# Patient Record
Sex: FEMALE | Race: WHITE | Employment: UNEMPLOYED | URBAN - METROPOLITAN AREA
[De-identification: names, ages, dates, MRNs, and addresses within clinical notes are randomized per-mention and may not be internally consistent; named-entity substitution may affect disease eponyms.]

---

## 2019-07-29 ENCOUNTER — HOSPITAL ENCOUNTER (EMERGENCY)
Age: 84
Discharge: HOME | End: 2019-07-29
Attending: EMERGENCY MEDICINE
Payer: MEDICARE

## 2019-07-29 ENCOUNTER — APPOINTMENT (OUTPATIENT)
Dept: GENERAL RADIOLOGY | Age: 84
End: 2019-07-29
Payer: MEDICARE

## 2019-07-29 ENCOUNTER — APPOINTMENT (OUTPATIENT)
Dept: CT IMAGING | Age: 84
End: 2019-07-29
Payer: MEDICARE

## 2019-07-29 VITALS
HEIGHT: 66 IN | RESPIRATION RATE: 16 BRPM | OXYGEN SATURATION: 98 % | DIASTOLIC BLOOD PRESSURE: 75 MMHG | TEMPERATURE: 98.2 F | BODY MASS INDEX: 28.77 KG/M2 | WEIGHT: 179 LBS | HEART RATE: 78 BPM | SYSTOLIC BLOOD PRESSURE: 134 MMHG

## 2019-07-29 DIAGNOSIS — L30.9 DERMATITIS: ICD-10-CM

## 2019-07-29 DIAGNOSIS — L03.119 CELLULITIS OF UPPER EXTREMITY, UNSPECIFIED LATERALITY: ICD-10-CM
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
## 2019-07-29 DIAGNOSIS — R21 RASH AND OTHER NONSPECIFIC SKIN ERUPTION: Primary | ICD-10-CM
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
## 2019-07-29 LAB
ALBUMIN SERPL-MCNC: 4.1 G/DL (ref 3.5–5.2)
ALP BLD-CCNC: 84 U/L (ref 35–104)
ALT SERPL-CCNC: 30 U/L (ref 0–32)
ANION GAP SERPL CALCULATED.3IONS-SCNC: 15 MMOL/L (ref 7–16)
APTT: 30.5 SEC (ref 24.5–35.1)
AST SERPL-CCNC: 47 U/L (ref 0–31)
BASOPHILS ABSOLUTE: 0.06 E9/L (ref 0–0.2)
BASOPHILS RELATIVE PERCENT: 0.7 % (ref 0–2)
BILIRUB SERPL-MCNC: 0.3 MG/DL (ref 0–1.2)
BUN BLDV-MCNC: 24 MG/DL (ref 8–23)
CALCIUM SERPL-MCNC: 9.7 MG/DL (ref 8.6–10.2)
CHLORIDE BLD-SCNC: 103 MMOL/L (ref 98–107)
CO2: 28 MMOL/L (ref 22–29)
CREAT SERPL-MCNC: 1.5 MG/DL (ref 0.5–1)
EOSINOPHILS ABSOLUTE: 0.98 E9/L (ref 0.05–0.5)
EOSINOPHILS RELATIVE PERCENT: 11.7 % (ref 0–6)
GFR AFRICAN AMERICAN: 40
GFR NON-AFRICAN AMERICAN: 33 ML/MIN/1.73
GLUCOSE BLD-MCNC: 88 MG/DL (ref 74–99)
HCT VFR BLD CALC: 40.6 % (ref 34–48)
HEMOGLOBIN: 13.6 G/DL (ref 11.5–15.5)
IMMATURE GRANULOCYTES #: 0.05 E9/L
IMMATURE GRANULOCYTES %: 0.6 % (ref 0–5)
INR BLD: 1
LACTIC ACID, SEPSIS: 1.3 MMOL/L (ref 0.5–1.9)
LYMPHOCYTES ABSOLUTE: 1.61 E9/L (ref 1.5–4)
LYMPHOCYTES RELATIVE PERCENT: 19.2 % (ref 20–42)
MCH RBC QN AUTO: 30.2 PG (ref 26–35)
MCHC RBC AUTO-ENTMCNC: 33.5 % (ref 32–34.5)
MCV RBC AUTO: 90.2 FL (ref 80–99.9)
MONOCYTES ABSOLUTE: 0.73 E9/L (ref 0.1–0.95)
MONOCYTES RELATIVE PERCENT: 8.7 % (ref 2–12)
NEUTROPHILS ABSOLUTE: 4.94 E9/L (ref 1.8–7.3)
NEUTROPHILS RELATIVE PERCENT: 59.1 % (ref 43–80)
PDW BLD-RTO: 13.1 FL (ref 11.5–15)
PLATELET # BLD: 360 E9/L (ref 130–450)
PMV BLD AUTO: 9 FL (ref 7–12)
POTASSIUM SERPL-SCNC: 3.7 MMOL/L (ref 3.5–5)
PROTHROMBIN TIME: 11.2 SEC (ref 9.3–12.4)
RBC # BLD: 4.5 E12/L (ref 3.5–5.5)
SODIUM BLD-SCNC: 146 MMOL/L (ref 132–146)
TOTAL PROTEIN: 7.2 G/DL (ref 6.4–8.3)
WBC # BLD: 8.4 E9/L (ref 4.5–11.5)

## 2019-07-29 PROCEDURE — 36415 COLL VENOUS BLD VENIPUNCTURE: CPT

## 2019-07-29 PROCEDURE — 85025 COMPLETE CBC W/AUTO DIFF WBC: CPT

## 2019-07-29 PROCEDURE — 72125 CT NECK SPINE W/O DYE: CPT

## 2019-07-29 PROCEDURE — 6370000000 HC RX 637 (ALT 250 FOR IP): Performed by: NURSE PRACTITIONER

## 2019-07-29 PROCEDURE — 73030 X-RAY EXAM OF SHOULDER: CPT

## 2019-07-29 PROCEDURE — 96372 THER/PROPH/DIAG INJ SC/IM: CPT

## 2019-07-29 PROCEDURE — 70450 CT HEAD/BRAIN W/O DYE: CPT

## 2019-07-29 PROCEDURE — 73562 X-RAY EXAM OF KNEE 3: CPT

## 2019-07-29 PROCEDURE — 6360000002 HC RX W HCPCS: Performed by: NURSE PRACTITIONER

## 2019-07-29 PROCEDURE — 99284 EMERGENCY DEPT VISIT MOD MDM: CPT

## 2019-07-29 PROCEDURE — 85730 THROMBOPLASTIN TIME PARTIAL: CPT

## 2019-07-29 PROCEDURE — 83605 ASSAY OF LACTIC ACID: CPT

## 2019-07-29 PROCEDURE — 85610 PROTHROMBIN TIME: CPT

## 2019-07-29 PROCEDURE — 6370000000 HC RX 637 (ALT 250 FOR IP): Performed by: EMERGENCY MEDICINE

## 2019-07-29 PROCEDURE — 80053 COMPREHEN METABOLIC PANEL: CPT

## 2019-07-29 RX ORDER — DIPHENHYDRAMINE HCL 25 MG
50 TABLET ORAL ONCE
Status: COMPLETED | OUTPATIENT
Start: 2019-07-29 | End: 2019-07-29

## 2019-07-29 RX ORDER — HYDROXYZINE PAMOATE 25 MG/1
25 CAPSULE ORAL 3 TIMES DAILY PRN
Qty: 30 CAPSULE | Refills: 0 | Status: SHIPPED | OUTPATIENT
Start: 2019-07-29 | End: 2019-08-08

## 2019-07-29 RX ORDER — PREDNISONE 20 MG/1
TABLET ORAL
Qty: 18 TABLET | Refills: 0 | Status: SHIPPED | OUTPATIENT
Start: 2019-07-29 | End: 2019-08-08

## 2019-07-29 RX ORDER — NAPROXEN 500 MG/1
500 TABLET ORAL ONCE
Status: COMPLETED | OUTPATIENT
Start: 2019-07-29 | End: 2019-07-29

## 2019-07-29 RX ORDER — FAMOTIDINE 20 MG/1
20 TABLET, FILM COATED ORAL ONCE
Status: COMPLETED | OUTPATIENT
Start: 2019-07-29 | End: 2019-07-29

## 2019-07-29 RX ORDER — TRIAMCINOLONE ACETONIDE 40 MG/ML
40 INJECTION, SUSPENSION INTRA-ARTICULAR; INTRAMUSCULAR ONCE
Status: COMPLETED | OUTPATIENT
Start: 2019-07-29 | End: 2019-07-29

## 2019-07-29 RX ORDER — FAMOTIDINE 20 MG/1
20 TABLET, FILM COATED ORAL 2 TIMES DAILY
Qty: 14 TABLET | Refills: 0 | Status: ON HOLD | OUTPATIENT
Start: 2019-07-29 | End: 2022-06-14 | Stop reason: HOSPADM

## 2019-07-29 RX ORDER — DIAPER,BRIEF,INFANT-TODD,DISP
EACH MISCELLANEOUS
Qty: 30 G | Refills: 0 | Status: ON HOLD | OUTPATIENT
Start: 2019-07-29 | End: 2022-06-14 | Stop reason: HOSPADM

## 2019-07-29 RX ORDER — TRIAMCINOLONE ACETONIDE 5 MG/G
CREAM TOPICAL
Qty: 45 G | Refills: 0 | Status: SHIPPED | OUTPATIENT
Start: 2019-07-29 | End: 2019-08-05

## 2019-07-29 RX ORDER — DIPHENHYDRAMINE HCL 25 MG
50 CAPSULE ORAL EVERY 6 HOURS PRN
Qty: 40 CAPSULE | Refills: 0 | Status: SHIPPED | OUTPATIENT
Start: 2019-07-29 | End: 2019-08-08

## 2019-07-29 RX ORDER — HYDROXYZINE PAMOATE 25 MG/1
CAPSULE ORAL
Refills: 3 | COMMUNITY
Start: 2019-07-19 | End: 2019-07-29 | Stop reason: ALTCHOICE

## 2019-07-29 RX ORDER — CEPHALEXIN 500 MG/1
500 CAPSULE ORAL ONCE
Status: COMPLETED | OUTPATIENT
Start: 2019-07-29 | End: 2019-07-29

## 2019-07-29 RX ORDER — KETOROLAC TROMETHAMINE 30 MG/ML
60 INJECTION, SOLUTION INTRAMUSCULAR; INTRAVENOUS ONCE
Status: DISCONTINUED | OUTPATIENT
Start: 2019-07-29 | End: 2019-07-29

## 2019-07-29 RX ORDER — CEPHALEXIN 500 MG/1
500 CAPSULE ORAL 3 TIMES DAILY
Qty: 21 CAPSULE | Refills: 0 | Status: SHIPPED | OUTPATIENT
Start: 2019-07-29 | End: 2019-08-05

## 2019-07-29 RX ADMIN — CEPHALEXIN 500 MG: 500 CAPSULE ORAL at 17:48

## 2019-07-29 RX ADMIN — FAMOTIDINE 20 MG: 20 TABLET ORAL at 14:11

## 2019-07-29 RX ADMIN — NAPROXEN 500 MG: 500 TABLET ORAL at 14:11

## 2019-07-29 RX ADMIN — DIPHENHYDRAMINE HCL 50 MG: 25 TABLET ORAL at 14:11

## 2019-07-29 RX ADMIN — TRIAMCINOLONE ACETONIDE 40 MG: 40 INJECTION, SUSPENSION INTRA-ARTICULAR; INTRAMUSCULAR at 14:11

## 2019-07-29 ASSESSMENT — ENCOUNTER SYMPTOMS
SHORTNESS OF BREATH: 0
VISUAL CHANGE: 0
NAUSEA: 0
VOMITING: 0
ABDOMINAL PAIN: 0

## 2019-07-29 ASSESSMENT — PAIN SCALES - GENERAL: PAINLEVEL_OUTOF10: 9

## 2019-07-29 NOTE — ED PROVIDER NOTES
have spoken with the patient and discussed todays results, in addition to providing specific details for the plan of care and counseling regarding the diagnosis and prognosis. Their questions are answered at this time and they are agreeable with the plan. I discussed at length with them reasons for immediate return here for re evaluation. They will followup with their dermatologist and primary care physician by calling their office on Monday.      --------------------------------- ADDITIONAL PROVIDER NOTES ---------------------------------  At this time the patient is without objective evidence of an acute process requiring hospitalization or inpatient management. They have remained hemodynamically stable throughout their entire ED visit and are stable for discharge with outpatient follow-up. The plan has been discussed in detail and they are aware of the specific conditions for emergent return, as well as the importance of follow-up. New Prescriptions    CEPHALEXIN (KEFLEX) 500 MG CAPSULE    Take 1 capsule by mouth 3 times daily for 7 days    DIPHENHYDRAMINE (BENADRYL ALLERGY) 25 MG CAPSULE    Take 2 capsules by mouth every 6 hours as needed for Itching    FAMOTIDINE (PEPCID) 20 MG TABLET    Take 1 tablet by mouth 2 times daily for 7 days    FAMOTIDINE (PEPCID) 20 MG TABLET    Take 1 tablet by mouth 2 times daily for 7 days    HYDROCORTISONE 1 % OINTMENT    Apply topically 2 times daily. HYDROXYZINE (VISTARIL) 25 MG CAPSULE    Take 1 capsule by mouth 3 times daily as needed for Itching    PREDNISONE (DELTASONE) 20 MG TABLET    Sig.: Take 60mg  Po qd x 3 days, then 40mg po qd x3 days, then 20mg po qd x 3 days. QS x 9 days    TRIAMCINOLONE (ARISTOCORT) 0.5 % CREAM    Apply topically 3 times daily. Diagnosis:  1. Rash and other nonspecific skin eruption    2. Dermatitis    3. Fall at home, initial encounter    4.  Cellulitis of upper extremity, unspecified laterality        Disposition:  Patient's daily as needed for Itching, Disp-30 capsule, R-0Print      hydrocortisone 1 % ointment Apply topically 2 times daily. , Disp-30 g, R-0, Print      cephALEXin (KEFLEX) 500 MG capsule Take 1 capsule by mouth 3 times daily for 7 days, Disp-21 capsule, R-0Print       !! - Potential duplicate medications found. Please discuss with provider.         DO Alexander Tang DO  08/01/19 Manuel Fox

## 2019-10-14 ENCOUNTER — HOSPITAL ENCOUNTER (OUTPATIENT)
Age: 84
Discharge: HOME | End: 2019-10-14
Payer: MEDICARE

## 2019-10-14 LAB
ALBUMIN SERPL-MCNC: 4.1 G/DL (ref 3.5–5.2)
ALP BLD-CCNC: 80 U/L (ref 35–104)
ALT SERPL-CCNC: 21 U/L (ref 0–32)
ANION GAP SERPL CALCULATED.3IONS-SCNC: 13 MMOL/L (ref 7–16)
AST SERPL-CCNC: 21 U/L (ref 0–31)
BACTERIA: ABNORMAL /HPF
BASOPHILS ABSOLUTE: 0.06 E9/L (ref 0–0.2)
BASOPHILS RELATIVE PERCENT: 0.6 % (ref 0–2)
BILIRUB SERPL-MCNC: 0.3 MG/DL (ref 0–1.2)
BILIRUBIN URINE: NEGATIVE
BLOOD, URINE: NEGATIVE
BUN BLDV-MCNC: 22 MG/DL (ref 8–23)
CALCIUM SERPL-MCNC: 9.8 MG/DL (ref 8.6–10.2)
CHLORIDE BLD-SCNC: 96 MMOL/L (ref 98–107)
CLARITY: ABNORMAL
CO2: 29 MMOL/L (ref 22–29)
COLOR: YELLOW
CREAT SERPL-MCNC: 1.5 MG/DL (ref 0.5–1)
CREATININE URINE: 155 MG/DL (ref 29–226)
EOSINOPHILS ABSOLUTE: 0.33 E9/L (ref 0.05–0.5)
EOSINOPHILS RELATIVE PERCENT: 3.6 % (ref 0–6)
EPITHELIAL CELLS, UA: ABNORMAL /HPF
GFR AFRICAN AMERICAN: 40
GFR NON-AFRICAN AMERICAN: 33 ML/MIN/1.73
GLUCOSE BLD-MCNC: 203 MG/DL (ref 74–99)
GLUCOSE URINE: 250 MG/DL
HCT VFR BLD CALC: 45.5 % (ref 34–48)
HEMOGLOBIN: 14.9 G/DL (ref 11.5–15.5)
IMMATURE GRANULOCYTES #: 0.03 E9/L
IMMATURE GRANULOCYTES %: 0.3 % (ref 0–5)
KETONES, URINE: ABNORMAL MG/DL
LEUKOCYTE ESTERASE, URINE: ABNORMAL
LYMPHOCYTES ABSOLUTE: 1.18 E9/L (ref 1.5–4)
LYMPHOCYTES RELATIVE PERCENT: 12.7 % (ref 20–42)
MAGNESIUM: 2.2 MG/DL (ref 1.6–2.6)
MCH RBC QN AUTO: 29.9 PG (ref 26–35)
MCHC RBC AUTO-ENTMCNC: 32.7 % (ref 32–34.5)
MCV RBC AUTO: 91.2 FL (ref 80–99.9)
MONOCYTES ABSOLUTE: 0.69 E9/L (ref 0.1–0.95)
MONOCYTES RELATIVE PERCENT: 7.5 % (ref 2–12)
NEUTROPHILS ABSOLUTE: 6.97 E9/L (ref 1.8–7.3)
NEUTROPHILS RELATIVE PERCENT: 75.3 % (ref 43–80)
NITRITE, URINE: NEGATIVE
PARATHYROID HORMONE INTACT: 170 PG/ML (ref 15–65)
PDW BLD-RTO: 14.1 FL (ref 11.5–15)
PH UA: 6.5 (ref 5–9)
PHOSPHORUS: 1.9 MG/DL (ref 2.5–4.5)
PLATELET # BLD: 310 E9/L (ref 130–450)
PMV BLD AUTO: 9.7 FL (ref 7–12)
POTASSIUM SERPL-SCNC: 4 MMOL/L (ref 3.5–5)
PROTEIN PROTEIN: 43 MG/DL (ref 0–12)
PROTEIN UA: ABNORMAL MG/DL
PROTEIN/CREAT RATIO: 0.3
PROTEIN/CREAT RATIO: 0.3 (ref 0–0.2)
RBC # BLD: 4.99 E12/L (ref 3.5–5.5)
RBC UA: ABNORMAL /HPF (ref 0–2)
SODIUM BLD-SCNC: 138 MMOL/L (ref 132–146)
SPECIFIC GRAVITY UA: 1.01 (ref 1–1.03)
TOTAL PROTEIN: 7.3 G/DL (ref 6.4–8.3)
URIC ACID, SERUM: 7 MG/DL (ref 2.4–5.7)
UROBILINOGEN, URINE: 1 E.U./DL
VITAMIN D 25-HYDROXY: 74 NG/ML (ref 30–100)
WBC # BLD: 9.3 E9/L (ref 4.5–11.5)
WBC UA: ABNORMAL /HPF (ref 0–5)

## 2019-10-14 PROCEDURE — 85025 COMPLETE CBC W/AUTO DIFF WBC: CPT

## 2019-10-14 PROCEDURE — 82306 VITAMIN D 25 HYDROXY: CPT

## 2019-10-14 PROCEDURE — 84550 ASSAY OF BLOOD/URIC ACID: CPT

## 2019-10-14 PROCEDURE — 83735 ASSAY OF MAGNESIUM: CPT

## 2019-10-14 PROCEDURE — 84156 ASSAY OF PROTEIN URINE: CPT

## 2019-10-14 PROCEDURE — 84100 ASSAY OF PHOSPHORUS: CPT

## 2019-10-14 PROCEDURE — 36415 COLL VENOUS BLD VENIPUNCTURE: CPT

## 2019-10-14 PROCEDURE — 81001 URINALYSIS AUTO W/SCOPE: CPT

## 2019-10-14 PROCEDURE — 80053 COMPREHEN METABOLIC PANEL: CPT

## 2019-10-14 PROCEDURE — 83970 ASSAY OF PARATHORMONE: CPT

## 2019-10-14 PROCEDURE — 82570 ASSAY OF URINE CREATININE: CPT

## 2021-01-25 ENCOUNTER — IMMUNIZATION (OUTPATIENT)
Dept: PRIMARY CARE CLINIC | Age: 86
End: 2021-01-25
Payer: MEDICARE

## 2021-01-25 PROCEDURE — 0001A COVID-19, PFIZER VACCINE 30MCG/0.3ML DOSE: CPT | Performed by: NURSE PRACTITIONER

## 2021-01-25 PROCEDURE — 91300 COVID-19, PFIZER VACCINE 30MCG/0.3ML DOSE: CPT | Performed by: NURSE PRACTITIONER

## 2021-02-15 ENCOUNTER — IMMUNIZATION (OUTPATIENT)
Dept: PRIMARY CARE CLINIC | Age: 86
End: 2021-02-15
Payer: MEDICARE

## 2021-02-15 PROCEDURE — 91300 COVID-19, PFIZER VACCINE 30MCG/0.3ML DOSE: CPT | Performed by: CLINICAL NURSE SPECIALIST

## 2021-02-15 PROCEDURE — 0002A COVID-19, PFIZER VACCINE 30MCG/0.3ML DOSE: CPT | Performed by: CLINICAL NURSE SPECIALIST

## 2021-10-19 ENCOUNTER — IMMUNIZATION (OUTPATIENT)
Dept: PRIMARY CARE CLINIC | Age: 86
End: 2021-10-19
Payer: MEDICARE

## 2021-10-19 PROCEDURE — 91300 COVID-19, PFIZER VACCINE 30MCG/0.3ML DOSE: CPT | Performed by: NURSE PRACTITIONER

## 2021-10-19 PROCEDURE — 0003A COVID-19, PFIZER VACCINE 30MCG/0.3ML DOSE: CPT | Performed by: NURSE PRACTITIONER

## 2022-06-09 ENCOUNTER — HOSPITAL ENCOUNTER (INPATIENT)
Age: 87
LOS: 2 days | Discharge: NURSING, REHAB OR LONG-TERM CARE | DRG: 057 | End: 2022-06-14
Attending: STUDENT IN AN ORGANIZED HEALTH CARE EDUCATION/TRAINING PROGRAM | Admitting: INTERNAL MEDICINE
Payer: MEDICARE

## 2022-06-09 ENCOUNTER — APPOINTMENT (OUTPATIENT)
Dept: GENERAL RADIOLOGY | Age: 87
DRG: 057 | End: 2022-06-09
Payer: MEDICARE

## 2022-06-09 ENCOUNTER — APPOINTMENT (OUTPATIENT)
Dept: CT IMAGING | Age: 87
DRG: 057 | End: 2022-06-09
Payer: MEDICARE

## 2022-06-09 DIAGNOSIS — R41.82 ALTERED MENTAL STATUS, UNSPECIFIED ALTERED MENTAL STATUS TYPE: Primary | ICD-10-CM

## 2022-06-09 PROBLEM — R29.6 FALLS FREQUENTLY: Status: ACTIVE | Noted: 2022-06-09

## 2022-06-09 LAB
ALBUMIN SERPL-MCNC: 3.9 G/DL (ref 3.5–5.2)
ALP BLD-CCNC: 79 U/L (ref 35–104)
ALT SERPL-CCNC: 15 U/L (ref 0–32)
ANION GAP SERPL CALCULATED.3IONS-SCNC: 12 MMOL/L (ref 7–16)
AST SERPL-CCNC: 24 U/L (ref 0–31)
BACTERIA: NORMAL /HPF
BASOPHILS ABSOLUTE: 0.08 E9/L (ref 0–0.2)
BASOPHILS RELATIVE PERCENT: 0.6 % (ref 0–2)
BILIRUB SERPL-MCNC: 0.5 MG/DL (ref 0–1.2)
BILIRUBIN URINE: NEGATIVE
BLOOD, URINE: NEGATIVE
BUN BLDV-MCNC: 23 MG/DL (ref 6–23)
CALCIUM SERPL-MCNC: 9.7 MG/DL (ref 8.6–10.2)
CHLORIDE BLD-SCNC: 98 MMOL/L (ref 98–107)
CLARITY: CLEAR
CO2: 30 MMOL/L (ref 22–29)
COLOR: YELLOW
CREAT SERPL-MCNC: 1.4 MG/DL (ref 0.5–1)
EOSINOPHILS ABSOLUTE: 0.35 E9/L (ref 0.05–0.5)
EOSINOPHILS RELATIVE PERCENT: 2.7 % (ref 0–6)
GFR AFRICAN AMERICAN: 43
GFR NON-AFRICAN AMERICAN: 36 ML/MIN/1.73
GLUCOSE BLD-MCNC: 199 MG/DL (ref 74–99)
GLUCOSE URINE: NEGATIVE MG/DL
HCT VFR BLD CALC: 44.6 % (ref 34–48)
HEMOGLOBIN: 14.7 G/DL (ref 11.5–15.5)
IMMATURE GRANULOCYTES #: 0.09 E9/L
IMMATURE GRANULOCYTES %: 0.7 % (ref 0–5)
KETONES, URINE: NEGATIVE MG/DL
LEUKOCYTE ESTERASE, URINE: NEGATIVE
LYMPHOCYTES ABSOLUTE: 1.25 E9/L (ref 1.5–4)
LYMPHOCYTES RELATIVE PERCENT: 9.6 % (ref 20–42)
MAGNESIUM: 2 MG/DL (ref 1.6–2.6)
MCH RBC QN AUTO: 30.5 PG (ref 26–35)
MCHC RBC AUTO-ENTMCNC: 33 % (ref 32–34.5)
MCV RBC AUTO: 92.5 FL (ref 80–99.9)
MONOCYTES ABSOLUTE: 1.26 E9/L (ref 0.1–0.95)
MONOCYTES RELATIVE PERCENT: 9.7 % (ref 2–12)
NEUTROPHILS ABSOLUTE: 9.98 E9/L (ref 1.8–7.3)
NEUTROPHILS RELATIVE PERCENT: 76.7 % (ref 43–80)
NITRITE, URINE: NEGATIVE
PDW BLD-RTO: 12.8 FL (ref 11.5–15)
PH UA: 6.5 (ref 5–9)
PLATELET # BLD: 390 E9/L (ref 130–450)
PMV BLD AUTO: 9.6 FL (ref 7–12)
POTASSIUM REFLEX MAGNESIUM: 3.4 MMOL/L (ref 3.5–5)
PROTEIN UA: NEGATIVE MG/DL
RBC # BLD: 4.82 E12/L (ref 3.5–5.5)
RBC UA: NORMAL /HPF (ref 0–2)
SODIUM BLD-SCNC: 140 MMOL/L (ref 132–146)
SPECIFIC GRAVITY UA: 1.01 (ref 1–1.03)
TOTAL CK: 125 U/L (ref 20–180)
TOTAL PROTEIN: 7.1 G/DL (ref 6.4–8.3)
TROPONIN, HIGH SENSITIVITY: 15 NG/L (ref 0–9)
TROPONIN, HIGH SENSITIVITY: 17 NG/L (ref 0–9)
UROBILINOGEN, URINE: 0.2 E.U./DL
WBC # BLD: 13 E9/L (ref 4.5–11.5)
WBC UA: NORMAL /HPF (ref 0–5)

## 2022-06-09 PROCEDURE — 87088 URINE BACTERIA CULTURE: CPT

## 2022-06-09 PROCEDURE — 36415 COLL VENOUS BLD VENIPUNCTURE: CPT

## 2022-06-09 PROCEDURE — 96374 THER/PROPH/DIAG INJ IV PUSH: CPT

## 2022-06-09 PROCEDURE — 99285 EMERGENCY DEPT VISIT HI MDM: CPT

## 2022-06-09 PROCEDURE — 2500000003 HC RX 250 WO HCPCS: Performed by: STUDENT IN AN ORGANIZED HEALTH CARE EDUCATION/TRAINING PROGRAM

## 2022-06-09 PROCEDURE — 83735 ASSAY OF MAGNESIUM: CPT

## 2022-06-09 PROCEDURE — 84484 ASSAY OF TROPONIN QUANT: CPT

## 2022-06-09 PROCEDURE — 81001 URINALYSIS AUTO W/SCOPE: CPT

## 2022-06-09 PROCEDURE — 93005 ELECTROCARDIOGRAM TRACING: CPT | Performed by: STUDENT IN AN ORGANIZED HEALTH CARE EDUCATION/TRAINING PROGRAM

## 2022-06-09 PROCEDURE — 70450 CT HEAD/BRAIN W/O DYE: CPT

## 2022-06-09 PROCEDURE — 73560 X-RAY EXAM OF KNEE 1 OR 2: CPT

## 2022-06-09 PROCEDURE — 2580000003 HC RX 258: Performed by: STUDENT IN AN ORGANIZED HEALTH CARE EDUCATION/TRAINING PROGRAM

## 2022-06-09 PROCEDURE — 80053 COMPREHEN METABOLIC PANEL: CPT

## 2022-06-09 PROCEDURE — 82550 ASSAY OF CK (CPK): CPT

## 2022-06-09 PROCEDURE — 72125 CT NECK SPINE W/O DYE: CPT

## 2022-06-09 PROCEDURE — 96361 HYDRATE IV INFUSION ADD-ON: CPT

## 2022-06-09 PROCEDURE — G0378 HOSPITAL OBSERVATION PER HR: HCPCS

## 2022-06-09 PROCEDURE — 87077 CULTURE AEROBIC IDENTIFY: CPT

## 2022-06-09 PROCEDURE — 87186 SC STD MICRODIL/AGAR DIL: CPT

## 2022-06-09 PROCEDURE — 72170 X-RAY EXAM OF PELVIS: CPT

## 2022-06-09 PROCEDURE — 85025 COMPLETE CBC W/AUTO DIFF WBC: CPT

## 2022-06-09 PROCEDURE — 71046 X-RAY EXAM CHEST 2 VIEWS: CPT

## 2022-06-09 RX ORDER — LABETALOL HYDROCHLORIDE 5 MG/ML
10 INJECTION, SOLUTION INTRAVENOUS ONCE
Status: COMPLETED | OUTPATIENT
Start: 2022-06-09 | End: 2022-06-09

## 2022-06-09 RX ORDER — 0.9 % SODIUM CHLORIDE 0.9 %
1000 INTRAVENOUS SOLUTION INTRAVENOUS ONCE
Status: COMPLETED | OUTPATIENT
Start: 2022-06-09 | End: 2022-06-09

## 2022-06-09 RX ADMIN — SODIUM CHLORIDE 1000 ML: 9 INJECTION, SOLUTION INTRAVENOUS at 13:55

## 2022-06-09 RX ADMIN — LABETALOL HYDROCHLORIDE 10 MG: 5 INJECTION INTRAVENOUS at 18:14

## 2022-06-09 ASSESSMENT — PAIN - FUNCTIONAL ASSESSMENT: PAIN_FUNCTIONAL_ASSESSMENT: NONE - DENIES PAIN

## 2022-06-09 NOTE — LETTER
[address] Medicaid  CERTIFICATION OF NECESSITY  FOR TRANSPORTATION   BY WHEELCHAIR VAN     Individual Information   1. Name: Stefania Pelletier 2. [place] Medicaid Billing Number: facility   3. Address: [address] [place] [address]      Transportation Provider Information   4. Provider Name: Yohana Mae    5. [place] Medicaid Provider Number:  National Provider Identifier (NPI):      Certification  7. Criteria:  By signing this document, the practitioner certifies that two statements are true:  A. This individual must be accompanied by a mobility-related assistive device from the point of pick-up to the point of drop-off. B. Transport of this individual by standard passenger vehicle or common carrier is precluded or contraindicated. 8. Period Beginning Date:    9. Length  [x] Not more than 1 day(s)  [] One Year     Additional Information Relevant to Certification   10. Comments or Explanations, If Necessary or Appropriate     Weakness, dementia,       Certifying Practitioner Information   11. Name of Practitioner: Richard Gonzalez D.O.    [address] Medicaid Provider Number, If Applicable:  [address] Provider Identifier (NPI):      Signature Information   14. Date of Signature: 2022 15. Name of Person Signing: Soraya Prado    12. Signature and Professional Designation: Electronically signed by JERMAINE Baez on 2022 at 2:59 PM       [facility] [id number]  Rev. 2015          Stefania Pelletier : 1935 (86 yrs)    Address: [address] [place] [address] Sex: Female   [place] [address]         Marital Status:    Employer: NONE         Restorationism: [id number]   Primary Care Provider: Syeda Pérez MD         Primary Phone: [phone]   EMERGENCY CONTACT   Contact Name Legal Guardian? Relationship to Patient Home Phone Work Phone   1.  Talon Henao  2. *No Contact Specified*      Spouse    [phone]                 GUARANTOR            Guarantor: Stefania Pelletier     : 1935   Address: [facility] [address]rera Sex: Female   [place] [id number]     Relation to Patient: Self       Home Phone: [phone]   Guarantor ID: [id number]       Work Phone:     Guarantor Employer: NONE         Status: NOT EMPLO*      COVERAGE        PRIMARY INSURANCE   Payor: [facility] MEDICARE Plan: [facility] MEDICARE COMPLETE   Payor Address: ,          Group Number: [id number] Insurance Type: INDEMNITY   Subscriber Name: Ran Martinez : 1935   Subscriber ID: [id number] Pat.  Rel. to Sub: Self

## 2022-06-09 NOTE — ED NOTES
Patient incontinent of urine. Patient cleaned, new gown applied, and new bed linens put on bed.       Elpidio Tracy RN  06/09/22 3069

## 2022-06-09 NOTE — PROGRESS NOTES
Patient's jewelry removed and placed in an envelope. Envelope given to patient. Patient left CT department with jewelry.   One necklace with cross

## 2022-06-09 NOTE — ED PROVIDER NOTES
Department of Emergency Medicine   ED  Provider Note  Admit Date/RoomTime: 6/9/2022  1:29 PM  ED Room: 8404/8404-B          History of Present Illness:  6/9/22, Time: 1:39 PM EDT  Chief Complaint   Patient presents with    Fall     frequent falls, dizziness    Altered Mental Status     increasingly more confused, spouse having difficulty caring for pt        Cathleen De Anda is a 81 y.o. female presenting to the ED for fall, beginning just prior to arrival.  The complaint has been persistent, moderate in severity, and worsened by nothing. The patient is an 55-year-old female with a history of type 2 diabetes, hypertension, hyperlipidemia who presents to the emergency department complaining of fall and increased confusion. The patient symptoms have been gradual onset over the past several days, persistent, moderate severity, nothing makes it better or worse. Apparently the spouse is having difficulty care for the patient at home. She has been increasingly more confused over the past several days. She also been experiencing frequent falls and dizziness at times. Patient denies any symptoms currently. She states that she was walking in the bathroom and twisted her left knee causing her to fall just prior to arrival.  She denies hitting her head, loss of consciousness, anticoagulation use, chest pain, shortness of breath, numbness, tingling, unilateral weakness, abdominal pain, nausea, vomiting, diarrhea, recent hospitalization, recent was, or other acute symptoms or concerns.     Review of Systems:   A complete review of systems was performed and pertinent positives and negatives are stated within HPI, all other systems reviewed and are negative.        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 Past Surgical History:  has a past surgical history that includes  section; eye surgery; Colonoscopy; Cholecystectomy; and Hysterectomy. Social History:  reports that she quit smoking about 36 years ago. She has a 60.00 pack-year smoking history. She has never used smokeless tobacco. She reports current alcohol use. She reports that she does not use drugs. Family History: family history is not on file. . Unless otherwise noted, family history is non contributory    The patients home medications have been reviewed. Allergies: Latex    I have reviewed the past medical history, past surgical history, social history, and family history    ---------------------------------------------------PHYSICAL EXAM--------------------------------------    Constitutional/General: Alert and oriented x3, patient sitting up in bed resting comfortably and in no acute distress  Head: Normocephalic and atraumatic  Eyes:  EOMI, sclera non icteric  ENT: Oropharynx clear, handling secretions, no trismus, no asymmetry of the posterior oropharynx or uvular edema  Neck: Supple, full ROM, no stridor, no meningeal signs  Respiratory: Lungs clear to auscultation bilaterally, no wheezes, rales, or rhonchi. Not in respiratory distress  Cardiovascular:  Regular rate. Regular rhythm. No murmurs, no gallops, no rubs. 2+ distal pulses. Equal extremity pulses. Gastrointestinal:  Abdomen Soft, Non tender, Non distended. No rebound, guarding, or rigidity. No pulsatile masses. Musculoskeletal: Moves all extremities x 4. Warm and well perfused, no clubbing, no cyanosis, no edema. Capillary refill <3 seconds. Mild tenderness palpation diffusely throughout the left knee. There is no decreased range of motion. There is no edema. No erythema. No evidence of septic joint. Sensation intact and equal to the bilateral lower extremities.   No evidence of trauma. There is no cervical, thoracic, or lumbar spinous process tenderness or step-offs or deformities. Skin: skin warm and dry. No rashes. Neurologic: GCS 15, no focal deficits, symmetric strength 5/5 in the upper and lower extremities bilaterally  Psychiatric: Normal Affect      -------------------------------------------------- RESULTS -------------------------------------------------  I have personally reviewed all laboratory and imaging results for this patient. Results are listed below.      LABS: (Lab results interpreted by me)  Results for orders placed or performed during the hospital encounter of 06/09/22   CBC with Auto Differential   Result Value Ref Range    WBC 13.0 (H) 4.5 - 11.5 E9/L    RBC 4.82 3.50 - 5.50 E12/L    Hemoglobin 14.7 11.5 - 15.5 g/dL    Hematocrit 44.6 34.0 - 48.0 %    MCV 92.5 80.0 - 99.9 fL    MCH 30.5 26.0 - 35.0 pg    MCHC 33.0 32.0 - 34.5 %    RDW 12.8 11.5 - 15.0 fL    Platelets 134 317 - 129 E9/L    MPV 9.6 7.0 - 12.0 fL    Neutrophils % 76.7 43.0 - 80.0 %    Immature Granulocytes % 0.7 0.0 - 5.0 %    Lymphocytes % 9.6 (L) 20.0 - 42.0 %    Monocytes % 9.7 2.0 - 12.0 %    Eosinophils % 2.7 0.0 - 6.0 %    Basophils % 0.6 0.0 - 2.0 %    Neutrophils Absolute 9.98 (H) 1.80 - 7.30 E9/L    Immature Granulocytes # 0.09 E9/L    Lymphocytes Absolute 1.25 (L) 1.50 - 4.00 E9/L    Monocytes Absolute 1.26 (H) 0.10 - 0.95 E9/L    Eosinophils Absolute 0.35 0.05 - 0.50 E9/L    Basophils Absolute 0.08 0.00 - 0.20 E9/L   Comprehensive Metabolic Panel w/ Reflex to MG   Result Value Ref Range    Sodium 140 132 - 146 mmol/L    Potassium reflex Magnesium 3.4 (L) 3.5 - 5.0 mmol/L    Chloride 98 98 - 107 mmol/L    CO2 30 (H) 22 - 29 mmol/L    Anion Gap 12 7 - 16 mmol/L    Glucose 199 (H) 74 - 99 mg/dL    BUN 23 6 - 23 mg/dL    CREATININE 1.4 (H) 0.5 - 1.0 mg/dL    GFR Non-African American 36 >=60 mL/min/1.73    GFR African American 43     Calcium 9.7 8.6 - 10.2 mg/dL    Total Protein 7.1 6.4 - 8.3 g/dL    Albumin 3.9 3.5 - 5.2 g/dL    Total Bilirubin 0.5 0.0 - 1.2 mg/dL    Alkaline Phosphatase 79 35 - 104 U/L    ALT 15 0 - 32 U/L    AST 24 0 - 31 U/L   Urinalysis with Microscopic   Result Value Ref Range    Color, UA Yellow Straw/Yellow    Clarity, UA Clear Clear    Glucose, Ur Negative Negative mg/dL    Bilirubin Urine Negative Negative    Ketones, Urine Negative Negative mg/dL    Specific Gravity, UA 1.010 1.005 - 1.030    Blood, Urine Negative Negative    pH, UA 6.5 5.0 - 9.0    Protein, UA Negative Negative mg/dL    Urobilinogen, Urine 0.2 <2.0 E.U./dL    Nitrite, Urine Negative Negative    Leukocyte Esterase, Urine Negative Negative    WBC, UA NONE 0 - 5 /HPF    RBC, UA NONE 0 - 2 /HPF    Bacteria, UA NONE SEEN None Seen /HPF   Troponin   Result Value Ref Range    Troponin, High Sensitivity 17 (H) 0 - 9 ng/L   CK   Result Value Ref Range    Total  20 - 180 U/L   Magnesium   Result Value Ref Range    Magnesium 2.0 1.6 - 2.6 mg/dL   Troponin   Result Value Ref Range    Troponin, High Sensitivity 15 (H) 0 - 9 ng/L   Basic Metabolic Panel w/ Reflex to MG   Result Value Ref Range    Sodium 141 132 - 146 mmol/L    Potassium reflex Magnesium 3.3 (L) 3.5 - 5.0 mmol/L    Chloride 102 98 - 107 mmol/L    CO2 26 22 - 29 mmol/L    Anion Gap 13 7 - 16 mmol/L    Glucose 144 (H) 74 - 99 mg/dL    BUN 19 6 - 23 mg/dL    CREATININE 1.1 (H) 0.5 - 1.0 mg/dL    GFR Non-African American 47 >=60 mL/min/1.73    GFR African American 57     Calcium 9.5 8.6 - 10.2 mg/dL   Hemoglobin A1c   Result Value Ref Range    Hemoglobin A1C 6.3 (H) 4.0 - 5.6 %   Magnesium   Result Value Ref Range    Magnesium 1.9 1.6 - 2.6 mg/dL   POCT Glucose   Result Value Ref Range    Meter Glucose 127 (H) 74 - 99 mg/dL   POCT Glucose   Result Value Ref Range    Meter Glucose 153 (H) 74 - 99 mg/dL   POCT Glucose   Result Value Ref Range    Meter Glucose 251 (H) 74 - 99 mg/dL   POCT Glucose   Result Value Ref Range    Meter Glucose 223 (H) 74 - 99 mg/dL   EKG 12 Lead   Result Value Ref Range    Ventricular Rate 81 BPM    Atrial Rate 81 BPM    P-R Interval 230 ms    QRS Duration 120 ms    Q-T Interval 464 ms    QTc Calculation (Bazett) 539 ms    P Axis 10 degrees    R Axis -33 degrees    T Axis 89 degrees   ,       RADIOLOGY:  Interpreted by Radiologist unless otherwise specified  CT HEAD WO CONTRAST   Final Result   No acute intracranial abnormality. Cortical atrophy and periventricular leukomalacia. CT CERVICAL SPINE WO CONTRAST   Final Result   No acute abnormality of the cervical spine. Advanced multilevel degenerative changes and facet arthrosis. 1.2 cm calcified right thyroid nodule. Follow-up with non emergent thyroid   sonogram recommended. XR CHEST (2 VW)   Final Result   Minimal opacity at the left lung base, likely atelectasis. Suspect small left pleural effusion. XR KNEE LEFT (1-2 VIEWS)   Final Result   No acute bony abnormalities. 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 Suspect small joint effusion. XR PELVIS (1-2 VIEWS)   Final Result   No acute abnormality of the pelvis. EKG Interpretation  Interpreted by emergency department physician, Dr. Naomie Carlton    EKG: This EKG is signed and interpreted by me. Rate: 81  Rhythm: Sinus  Interpretation: Normal sinus rhythm with first-degree AV block, left axis deviation, left ventricular hypertrophy with widening and repolarization abnormality present.   Nonspecific ST changes in the inferior and high lateral leads, no acute elevations, QTC is prolonged, QTC is 539  Comparison: stable as compared to patient's most recent EKG       ------------------------- NURSING NOTES AND VITALS REVIEWED ---------------------------   The nursing notes within the ED encounter and vital signs as below have been reviewed by myself  BP (!) 167/70   Pulse 73   Temp 98.2 °F (36.8 °C) (Temporal)   Resp 18   Ht 5' 6\" (1.676 m) Wt 184 lb 8 oz (83.7 kg)   SpO2 95%   BMI 29.78 kg/m²     Oxygen Saturation Interpretation: Normal    The patients available past medical records and past encounters were reviewed.         ------------------------------ ED COURSE/MEDICAL DECISION MAKING----------------------  Medications   amLODIPine (NORVASC) tablet 2.5 mg (2.5 mg Oral Given 6/10/22 0847)   aspirin EC tablet 81 mg (81 mg Oral Given 6/10/22 0846)   clopidogrel (PLAVIX) tablet 75 mg (75 mg Oral Given 6/10/22 0846)   hydroCHLOROthiazide (HYDRODIURIL) tablet 12.5 mg ( Oral Automatically Held 6/16/22 0900)   losartan (COZAAR) tablet 50 mg (50 mg Oral Given 6/10/22 0847)   metoprolol succinate (TOPROL XL) extended release tablet 25 mg (25 mg Oral Given 6/10/22 0846)   oxybutynin (DITROPAN) tablet 5 mg (5 mg Oral Given 6/10/22 0845)   pravastatin (PRAVACHOL) tablet 40 mg (40 mg Oral Given 6/10/22 0846)   sodium chloride flush 0.9 % injection 5-40 mL (10 mLs IntraVENous Given 6/10/22 0849)   sodium chloride flush 0.9 % injection 10 mL (10 mLs IntraVENous Given 6/10/22 1008)   0.9 % sodium chloride infusion (has no administration in time range)   enoxaparin (LOVENOX) injection 40 mg (40 mg SubCUTAneous Given 6/10/22 0843)   polyethylene glycol (GLYCOLAX) packet 17 g (has no administration in time range)   acetaminophen (TYLENOL) tablet 650 mg (has no administration in time range)     Or   acetaminophen (TYLENOL) suppository 650 mg (has no administration in time range)   glucose chewable tablet 16 g (has no administration in time range)   dextrose bolus 10% 125 mL (has no administration in time range)     Or   dextrose bolus 10% 250 mL (has no administration in time range)   glucagon (rDNA) injection 1 mg (has no administration in time range)   dextrose 5 % solution (has no administration in time range)   pantoprazole (PROTONIX) tablet 40 mg (40 mg Oral Given 6/10/22 4673)   polyethylene glycol (GLYCOLAX) packet 17 g (17 g Oral Given 6/10/22 9744) insulin lispro (HUMALOG) injection vial 0-12 Units (6 Units SubCUTAneous Given 6/10/22 1229)   insulin lispro (HUMALOG) injection vial 0-6 Units (0 Units SubCUTAneous Not Given 6/10/22 0109)   FLUoxetine (PROZAC) capsule 40 mg (40 mg Oral Given 6/10/22 0847)   potassium chloride (KLOR-CON M) extended release tablet 40 mEq (40 mEq Oral Given 6/10/22 0854)   docusate sodium (COLACE) capsule 100 mg (has no administration in time range)   0.9 % sodium chloride bolus (0 mLs IntraVENous Stopped 6/9/22 1600)   labetalol (NORMODYNE;TRANDATE) injection 10 mg (10 mg IntraVENous Given 6/9/22 1814)   magnesium sulfate 1000 mg in dextrose 5% 100 mL IVPB (0 mg IntraVENous Stopped 6/10/22 1135)           The cardiac monitor revealed NSR with a heart rate in the 70s as interpreted by me. The cardiac monitor was ordered secondary to the patient's fall and to monitor the patient for dysrhythmia. CPT [id number]       I, Dr. Janet Garcia, am the primary provider of record    Medical Decision Making:   The patient is an 72-year-old female presents the emergency department for fall and altered mental status. Patient was not altered and remains alert and oriented in the emergency department. There are no focal neurological deficits. She does have a history of CKD. Her creatinine is 1.4 and her baseline is usually 1.4-1.5. Labs are otherwise reassuring. EKG and delta troponin were negative. Urine did not show evidence of acute infection. CT of her head and cervical spine were negative for acute normalities. Small left-sided pleural effusion present on chest x-ray and some atelectasis, no obvious infiltrate or pneumonia. No evidence of fracture in her knee. Her white count is slightly elevated at 13 K. She does not have any symptoms of pneumonia at this time and is also afebrile. However, the  is having difficulty caring for her at home. She will be admitted to medicine for further evaluation and treatment.   She may benefit from PT/OT evaluation and rehab. Oxygen Saturation Interpretation: 95 % on room air. Re-Evaluations:  ED Course as of 06/10/22 1255   Thu Jun 09, 2022 1712 Spoke with [facility] for Dr. Shonda Koo, who accepted for admission. [KG]      ED Course User Index  [KG] Gavin Wu DO           This patient's ED course included: a personal history and physicial examination, re-evaluation prior to disposition, multiple bedside re-evaluations, cardiac monitoring, continuous pulse oximetry and complex medical decision making and emergency management    This patient has remained hemodynamically stable during their ED course. Consultations:  Spoke with Melisa (Medicine). Discussed case. They will admit this patient. Counseling: The emergency provider has spoken with the patient and discussed todays results, in addition to providing specific details for the plan of care and counseling regarding the diagnosis and prognosis. Questions are answered at this time and they are agreeable with the plan.       --------------------------------- IMPRESSION AND DISPOSITION ---------------------------------    IMPRESSION  1. Altered mental status, unspecified altered mental status type        DISPOSITION  Disposition: Admit to med/surg floor  Patient condition is stable        NOTE: This report was transcribed using voice recognition software.  Every effort was made to ensure accuracy; however, inadvertent computerized transcription errors may be present       Gavin Wu DO  06/10/22 1255

## 2022-06-10 LAB
ANION GAP SERPL CALCULATED.3IONS-SCNC: 13 MMOL/L (ref 7–16)
BUN BLDV-MCNC: 19 MG/DL (ref 6–23)
CALCIUM SERPL-MCNC: 9.5 MG/DL (ref 8.6–10.2)
CHLORIDE BLD-SCNC: 102 MMOL/L (ref 98–107)
CO2: 26 MMOL/L (ref 22–29)
CREAT SERPL-MCNC: 1.1 MG/DL (ref 0.5–1)
EKG ATRIAL RATE: 81 BPM
EKG P AXIS: 10 DEGREES
EKG P-R INTERVAL: 230 MS
EKG Q-T INTERVAL: 464 MS
EKG QRS DURATION: 120 MS
EKG QTC CALCULATION (BAZETT): 539 MS
EKG R AXIS: -33 DEGREES
EKG T AXIS: 89 DEGREES
EKG VENTRICULAR RATE: 81 BPM
GFR AFRICAN AMERICAN: 57
GFR NON-AFRICAN AMERICAN: 47 ML/MIN/1.73
GLUCOSE BLD-MCNC: 144 MG/DL (ref 74–99)
HBA1C MFR BLD: 6.3 % (ref 4–5.6)
MAGNESIUM: 1.9 MG/DL (ref 1.6–2.6)
METER GLUCOSE: 127 MG/DL (ref 74–99)
METER GLUCOSE: 151 MG/DL (ref 74–99)
METER GLUCOSE: 153 MG/DL (ref 74–99)
METER GLUCOSE: 194 MG/DL (ref 74–99)
METER GLUCOSE: 223 MG/DL (ref 74–99)
METER GLUCOSE: 251 MG/DL (ref 74–99)
POTASSIUM REFLEX MAGNESIUM: 3.3 MMOL/L (ref 3.5–5)
SODIUM BLD-SCNC: 141 MMOL/L (ref 132–146)

## 2022-06-10 PROCEDURE — 96365 THER/PROPH/DIAG IV INF INIT: CPT

## 2022-06-10 PROCEDURE — 6370000000 HC RX 637 (ALT 250 FOR IP): Performed by: INTERNAL MEDICINE

## 2022-06-10 PROCEDURE — 2580000003 HC RX 258: Performed by: NURSE PRACTITIONER

## 2022-06-10 PROCEDURE — 6360000002 HC RX W HCPCS: Performed by: NURSE PRACTITIONER

## 2022-06-10 PROCEDURE — 83036 HEMOGLOBIN GLYCOSYLATED A1C: CPT

## 2022-06-10 PROCEDURE — G0378 HOSPITAL OBSERVATION PER HR: HCPCS

## 2022-06-10 PROCEDURE — 6370000000 HC RX 637 (ALT 250 FOR IP): Performed by: NURSE PRACTITIONER

## 2022-06-10 PROCEDURE — 80048 BASIC METABOLIC PNL TOTAL CA: CPT

## 2022-06-10 PROCEDURE — 82962 GLUCOSE BLOOD TEST: CPT

## 2022-06-10 PROCEDURE — 99223 1ST HOSP IP/OBS HIGH 75: CPT | Performed by: PSYCHIATRY & NEUROLOGY

## 2022-06-10 PROCEDURE — 97161 PT EVAL LOW COMPLEX 20 MIN: CPT

## 2022-06-10 PROCEDURE — 6360000002 HC RX W HCPCS: Performed by: INTERNAL MEDICINE

## 2022-06-10 PROCEDURE — 97165 OT EVAL LOW COMPLEX 30 MIN: CPT

## 2022-06-10 PROCEDURE — 97535 SELF CARE MNGMENT TRAINING: CPT

## 2022-06-10 PROCEDURE — 83735 ASSAY OF MAGNESIUM: CPT

## 2022-06-10 PROCEDURE — 96372 THER/PROPH/DIAG INJ SC/IM: CPT

## 2022-06-10 PROCEDURE — 36415 COLL VENOUS BLD VENIPUNCTURE: CPT

## 2022-06-10 RX ORDER — SODIUM CHLORIDE 0.9 % (FLUSH) 0.9 %
10 SYRINGE (ML) INJECTION PRN
Status: DISCONTINUED | OUTPATIENT
Start: 2022-06-10 | End: 2022-06-14 | Stop reason: HOSPADM

## 2022-06-10 RX ORDER — ONDANSETRON 2 MG/ML
4 INJECTION INTRAMUSCULAR; INTRAVENOUS EVERY 6 HOURS PRN
Status: DISCONTINUED | OUTPATIENT
Start: 2022-06-10 | End: 2022-06-10

## 2022-06-10 RX ORDER — ACETAMINOPHEN 650 MG/1
650 SUPPOSITORY RECTAL EVERY 6 HOURS PRN
Status: DISCONTINUED | OUTPATIENT
Start: 2022-06-10 | End: 2022-06-14 | Stop reason: HOSPADM

## 2022-06-10 RX ORDER — DOCUSATE SODIUM 100 MG/1
100 CAPSULE, LIQUID FILLED ORAL NIGHTLY
Status: DISCONTINUED | OUTPATIENT
Start: 2022-06-10 | End: 2022-06-14 | Stop reason: HOSPADM

## 2022-06-10 RX ORDER — POTASSIUM CHLORIDE 20 MEQ/1
40 TABLET, EXTENDED RELEASE ORAL 2 TIMES DAILY WITH MEALS
Status: COMPLETED | OUTPATIENT
Start: 2022-06-10 | End: 2022-06-10

## 2022-06-10 RX ORDER — HYDROCHLOROTHIAZIDE 12.5 MG/1
12.5 TABLET ORAL
Status: DISCONTINUED | OUTPATIENT
Start: 2022-06-10 | End: 2022-06-14 | Stop reason: HOSPADM

## 2022-06-10 RX ORDER — LOSARTAN POTASSIUM 50 MG/1
50 TABLET ORAL DAILY
Status: DISCONTINUED | OUTPATIENT
Start: 2022-06-10 | End: 2022-06-14 | Stop reason: HOSPADM

## 2022-06-10 RX ORDER — CLOPIDOGREL BISULFATE 75 MG/1
75 TABLET ORAL DAILY
Status: DISCONTINUED | OUTPATIENT
Start: 2022-06-10 | End: 2022-06-14 | Stop reason: HOSPADM

## 2022-06-10 RX ORDER — AMLODIPINE BESYLATE 2.5 MG/1
2.5 TABLET ORAL DAILY
Status: DISCONTINUED | OUTPATIENT
Start: 2022-06-10 | End: 2022-06-13

## 2022-06-10 RX ORDER — POLYETHYLENE GLYCOL 3350 17 G/17G
17 POWDER, FOR SOLUTION ORAL DAILY
Status: DISCONTINUED | OUTPATIENT
Start: 2022-06-10 | End: 2022-06-14 | Stop reason: HOSPADM

## 2022-06-10 RX ORDER — FLUOXETINE HYDROCHLORIDE 20 MG/1
20 CAPSULE ORAL DAILY
Status: DISCONTINUED | OUTPATIENT
Start: 2022-06-10 | End: 2022-06-10

## 2022-06-10 RX ORDER — DEXTROSE MONOHYDRATE 50 MG/ML
100 INJECTION, SOLUTION INTRAVENOUS PRN
Status: DISCONTINUED | OUTPATIENT
Start: 2022-06-10 | End: 2022-06-14 | Stop reason: HOSPADM

## 2022-06-10 RX ORDER — POLYETHYLENE GLYCOL 3350 17 G/17G
17 POWDER, FOR SOLUTION ORAL DAILY PRN
Status: DISCONTINUED | OUTPATIENT
Start: 2022-06-10 | End: 2022-06-14 | Stop reason: HOSPADM

## 2022-06-10 RX ORDER — ONDANSETRON 4 MG/1
4 TABLET, ORALLY DISINTEGRATING ORAL EVERY 8 HOURS PRN
Status: DISCONTINUED | OUTPATIENT
Start: 2022-06-10 | End: 2022-06-10

## 2022-06-10 RX ORDER — INSULIN LISPRO 100 [IU]/ML
0-6 INJECTION, SOLUTION INTRAVENOUS; SUBCUTANEOUS NIGHTLY
Status: DISCONTINUED | OUTPATIENT
Start: 2022-06-10 | End: 2022-06-14 | Stop reason: HOSPADM

## 2022-06-10 RX ORDER — PANTOPRAZOLE SODIUM 40 MG/1
40 TABLET, DELAYED RELEASE ORAL
Status: DISCONTINUED | OUTPATIENT
Start: 2022-06-10 | End: 2022-06-14 | Stop reason: HOSPADM

## 2022-06-10 RX ORDER — OXYBUTYNIN CHLORIDE 5 MG/1
5 TABLET ORAL 2 TIMES DAILY
Status: DISCONTINUED | OUTPATIENT
Start: 2022-06-10 | End: 2022-06-14 | Stop reason: HOSPADM

## 2022-06-10 RX ORDER — SODIUM CHLORIDE 0.9 % (FLUSH) 0.9 %
5-40 SYRINGE (ML) INJECTION EVERY 12 HOURS SCHEDULED
Status: DISCONTINUED | OUTPATIENT
Start: 2022-06-10 | End: 2022-06-14 | Stop reason: HOSPADM

## 2022-06-10 RX ORDER — METOPROLOL SUCCINATE 25 MG/1
25 TABLET, EXTENDED RELEASE ORAL DAILY
Status: DISCONTINUED | OUTPATIENT
Start: 2022-06-10 | End: 2022-06-14 | Stop reason: HOSPADM

## 2022-06-10 RX ORDER — INSULIN LISPRO 100 [IU]/ML
0-12 INJECTION, SOLUTION INTRAVENOUS; SUBCUTANEOUS
Status: DISCONTINUED | OUTPATIENT
Start: 2022-06-10 | End: 2022-06-14 | Stop reason: HOSPADM

## 2022-06-10 RX ORDER — ENOXAPARIN SODIUM 100 MG/ML
40 INJECTION SUBCUTANEOUS DAILY
Status: DISCONTINUED | OUTPATIENT
Start: 2022-06-10 | End: 2022-06-14 | Stop reason: HOSPADM

## 2022-06-10 RX ORDER — PRAVASTATIN SODIUM 20 MG
40 TABLET ORAL DAILY
Status: DISCONTINUED | OUTPATIENT
Start: 2022-06-10 | End: 2022-06-14 | Stop reason: HOSPADM

## 2022-06-10 RX ORDER — SODIUM CHLORIDE 9 MG/ML
INJECTION, SOLUTION INTRAVENOUS PRN
Status: DISCONTINUED | OUTPATIENT
Start: 2022-06-10 | End: 2022-06-14 | Stop reason: HOSPADM

## 2022-06-10 RX ORDER — FLUOXETINE HYDROCHLORIDE 20 MG/1
40 CAPSULE ORAL DAILY
Status: DISCONTINUED | OUTPATIENT
Start: 2022-06-10 | End: 2022-06-14 | Stop reason: HOSPADM

## 2022-06-10 RX ORDER — MAGNESIUM SULFATE 1 G/100ML
1000 INJECTION INTRAVENOUS ONCE
Status: COMPLETED | OUTPATIENT
Start: 2022-06-10 | End: 2022-06-10

## 2022-06-10 RX ORDER — ACETAMINOPHEN 325 MG/1
650 TABLET ORAL EVERY 6 HOURS PRN
Status: DISCONTINUED | OUTPATIENT
Start: 2022-06-10 | End: 2022-06-14 | Stop reason: HOSPADM

## 2022-06-10 RX ORDER — ASPIRIN 81 MG/1
81 TABLET ORAL DAILY
Status: DISCONTINUED | OUTPATIENT
Start: 2022-06-10 | End: 2022-06-14 | Stop reason: HOSPADM

## 2022-06-10 RX ADMIN — OXYBUTYNIN CHLORIDE 5 MG: 5 TABLET ORAL at 20:32

## 2022-06-10 RX ADMIN — ENOXAPARIN SODIUM 40 MG: 100 INJECTION SUBCUTANEOUS at 08:43

## 2022-06-10 RX ADMIN — POTASSIUM CHLORIDE 40 MEQ: 20 TABLET, EXTENDED RELEASE ORAL at 17:45

## 2022-06-10 RX ADMIN — OXYBUTYNIN CHLORIDE 5 MG: 5 TABLET ORAL at 02:03

## 2022-06-10 RX ADMIN — AMLODIPINE BESYLATE 2.5 MG: 2.5 TABLET ORAL at 08:47

## 2022-06-10 RX ADMIN — SODIUM CHLORIDE, PRESERVATIVE FREE 10 ML: 5 INJECTION INTRAVENOUS at 08:49

## 2022-06-10 RX ADMIN — OXYBUTYNIN CHLORIDE 5 MG: 5 TABLET ORAL at 08:45

## 2022-06-10 RX ADMIN — PANTOPRAZOLE SODIUM 40 MG: 40 TABLET, DELAYED RELEASE ORAL at 05:36

## 2022-06-10 RX ADMIN — HYDROCHLOROTHIAZIDE 12.5 MG: 25 TABLET ORAL at 02:03

## 2022-06-10 RX ADMIN — INSULIN LISPRO 6 UNITS: 100 INJECTION, SOLUTION INTRAVENOUS; SUBCUTANEOUS at 12:29

## 2022-06-10 RX ADMIN — METOPROLOL SUCCINATE 25 MG: 25 TABLET, EXTENDED RELEASE ORAL at 08:46

## 2022-06-10 RX ADMIN — MAGNESIUM SULFATE HEPTAHYDRATE 1000 MG: 10 INJECTION, SOLUTION INTRAVENOUS at 10:11

## 2022-06-10 RX ADMIN — INSULIN LISPRO 2 UNITS: 100 INJECTION, SOLUTION INTRAVENOUS; SUBCUTANEOUS at 17:45

## 2022-06-10 RX ADMIN — PRAVASTATIN SODIUM 40 MG: 20 TABLET ORAL at 08:46

## 2022-06-10 RX ADMIN — POTASSIUM CHLORIDE 40 MEQ: 20 TABLET, EXTENDED RELEASE ORAL at 08:54

## 2022-06-10 RX ADMIN — DOCUSATE SODIUM 100 MG: 100 CAPSULE, LIQUID FILLED ORAL at 20:32

## 2022-06-10 RX ADMIN — POLYETHYLENE GLYCOL 3350 17 G: 17 POWDER, FOR SOLUTION ORAL at 08:49

## 2022-06-10 RX ADMIN — CLOPIDOGREL BISULFATE 75 MG: 75 TABLET ORAL at 08:46

## 2022-06-10 RX ADMIN — INSULIN LISPRO 1 UNITS: 100 INJECTION, SOLUTION INTRAVENOUS; SUBCUTANEOUS at 20:33

## 2022-06-10 RX ADMIN — FLUOXETINE 40 MG: 20 CAPSULE ORAL at 08:47

## 2022-06-10 RX ADMIN — SODIUM CHLORIDE, PRESERVATIVE FREE 10 ML: 5 INJECTION INTRAVENOUS at 20:33

## 2022-06-10 RX ADMIN — SODIUM CHLORIDE, PRESERVATIVE FREE 10 ML: 5 INJECTION INTRAVENOUS at 10:08

## 2022-06-10 RX ADMIN — LOSARTAN POTASSIUM 50 MG: 50 TABLET, FILM COATED ORAL at 08:47

## 2022-06-10 RX ADMIN — ASPIRIN 81 MG: 81 TABLET, COATED ORAL at 08:46

## 2022-06-10 ASSESSMENT — PAIN SCALES - GENERAL
PAINLEVEL_OUTOF10: 4
PAINLEVEL_OUTOF10: 0
PAINLEVEL_OUTOF10: 0

## 2022-06-10 NOTE — PLAN OF CARE
Problem: Skin/Tissue Integrity  Goal: Absence of new skin breakdown  Description: 1. Monitor for areas of redness and/or skin breakdown  2. Assess vascular access sites hourly  3. Every 4-6 hours minimum:  Change oxygen saturation probe site  4. Every 4-6 hours:  If on nasal continuous positive airway pressure, respiratory therapy assess nares and determine need for appliance change or resting period.   Outcome: Progressing     Problem: Safety - Adult  Goal: Free from fall injury  Outcome: Progressing     Problem: ABCDS Injury Assessment  Goal: Absence of physical injury  Outcome: Progressing     Problem: Pain  Goal: Verbalizes/displays adequate comfort level or baseline comfort level  Outcome: Progressing

## 2022-06-10 NOTE — PROGRESS NOTES
[address]        Date:6/10/2022                                                  Patient Name: Maryellen Luong    MRN: [id number]    : 1935    Room: [address] [place] [address]      Evaluating OT: Loren Mary, [address] OTR/L; [id number]      Referring Provider: REGINA Flores CNP    Specific Provider Orders/Date: OT Eval and Treat 6/10/2022      Diagnosis: Falls Frequently     Surgery:  None this admission     Pertinent Medical History:  has a past medical history of Diabetes mellitus ([facility] [address] Diarrhea, Hyperlipidemia, Hypertension, 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      Reason for Admission: frequent falls with dizziness and issues with L knee and increased confusion,  having difficulty caring for pt at home    Recommended Adaptive Equipment: TBD pending progression      Precautions:  Fall Risk, Confusion, incontinent      Assessment of current deficits:     [x] Functional mobility  [x]ADLs  [x] Strength               [x]Cognition    [x] Functional transfers   [x] IADLs         [x] Safety Awareness   [x]Endurance    [x] Fine Coordination              [x] Balance      [] Vision/perception   []Sensation     []Gross Motor Coordination  [] ROM  [] Delirium                   [] Motor Control     OT PLAN OF CARE   OT POC based on physician orders, patient diagnosis and results of clinical assessment    Frequency/Duration: 1-3 days/wk for 2 weeks PRN   Specific OT Treatment Interventions to include:   * Instruction/training on adapted ADL techniques and AE recommendations to increase functional independence within precautions       * Training on energy conservation strategies, correct breathing pattern and techniques to improve independence/tolerance for self-care routine  * Functional transfer/mobility training/DME recommendations for increased independence, safety, and fall prevention  * Patient/Family education to increase follow through with safety techniques and functional independence  * Recommendation of environmental modifications for increased safety with functional transfers/mobility and ADLs  * Therapeutic exercise to improve motor endurance, ROM, and functional strength for ADLs/functional transfers    Home Living: Pt questionable historian/recall of home set up. Son present and providing correct inforation   Pt lives with  (who is home 24/7 and retired) in 2 story home with 2 steps to [facility]. 1st floor set up.  1/2 bath on main floor  Bathroom setup: pt does not access downstairs or upstairs where showers are located  (tub only upstairs and shower downstairs)  Equipment owned: ww, cane    Prior Level of Function: ADLs - pt requires minimal assist from  with all dressing/bathing tasks   IADLs - dependent on  with groceries, cooking, and laundry (pt son present and reports him and his brother live within 10-15min from pt and pt  able to provide assist as needed)  ambulated with ww from bed to recliner or recliner to bathroom  Driving: no - pt  and children drive    Pain Level: 7/10 L knee pain  Cognition: A&O: 2/4 - pt oriented to self and lcoation, required cues to corrected month from July to Inga and year from [address], to 2022  Follows single step directions with increased verbal cues d/t easily distracted and poor short term memory recall   Memory:  fair   Sequencing:  fair   Problem solving:  fair   Judgement/safety:  fair     Functional Assessment:  AM-PAC Daily Activity Raw Score: 15/24   Initial Eval Status  Date: 6/10/22 Treatment Status  Date: STGs = LTGs  Time frame: 10-14 days   Feeding Minimal Assist   Tray set up and proper positioning  Supervision    Grooming Minimal Assist   Wash face seated EOB  Verbal cues to complete task/attention to task  Supervision    UB Dressing Minimal Assist   Doff/destin gown seated EOB with assist d/t poor seated dynamic balance  Supervision    LB Dressing Minimal Assist   [place]/doff socks seated EOB   Educated on figure 4 technique    Decreased dynamic sitting balance requiring verbal cues for correction of balance   Supervision    Bathing Moderate Assist  poor dyamic standing balance would benefit from shower chair   Per pt son completing sponge baths with assist from  for 1+ years  Supervision    Toileting Dependent   Pt unsafe to ambulate to bathroom at this time d/t weakness and decreased insight   RN aware pt incontinent of bowels during session   Minimal Assist    Bed Mobility  Log Roll: NT  Supine to sit: Minimal Assist assist with trunk and educate don use of bed rail  Sit to supine: Minimal Assist   Assist with BLE   Supine to sit: Independent   Sit to supine: Independent    Functional Transfers Sit to stand: Mod A with ww   Stand to sit: Mod A with ww   3 sit to stand transfers EOB with standing ~30sec - 1 minute (pt requesting to return to sitting)  Stand pivot: NT  Commode: NT  Sit to stand: SBA with ww   Stand to sit:SBA with ww  Stand pivot: SBA with ww  Commode: SBA with ww    Functional Mobility NT   Pt unable to tolerate standing > 1 minute, unable to progress BLE toward HOB  Moderate Assist with ww   Balance Sitting:     Static - SBA     Dynamic - SBA  Standing:  Mod A with ww  Sitting:  Static: Supervision  Dynamic: Supervision  Standing: SBA with ww   Activity Tolerance Fair -   Limited d/t overall weakness/fatigue with all functional mobility   Required seated rest break after each attempt to stand     Visual/  Perceptual Glasses: yes    visual tracking - easily distracted                    BUE  ROM/Strength/  Fine motor Coordination Hand dominance: Right      RUE: ROM WFL     Strength: 4/5      Strength: FAIR     Coordination:  FAIR     LUE: ROM WFL     Strength: 4-/5  Strength: FAIR -     Coordination:  FAIR -  increase BUE  muscle strength for improved indep with functional transfers       Fine Motor Coordination:  finger to nose assessment appears WFL  Hearing: WFL   Sensation:  No c/o numbness or tingling - however pt son present reports pt occasionally experiencing numbness on L side d/t history of stroke  Tone: WFL   Edema: unremarkable    Patient/Family Goal: pt family goal is d/c to [facility]    Comment: Cleared by RN to see pt. Upon arrival patient lying supine and agreeable to OT session. At end of session, patient lying supine with call light and phone within reach, all lines and tubes intact. Overall patient demonstrated decreased independence and safety during completion of ADL/functional transfer/mobility tasks. Pt would benefit from continued skilled OT to increase safety and independence with completion of ADL/IADL tasks for functional independence and quality of life. Treatment:   OT treatment provided this date includes:  Facilitation of bed mobility, unsupported sitting balance at EOB (decreased dynamic sitting balance impacting ADL - addressed posture, weight shifting, dynamic reaching to improve independence, difficulty with repositioning in bed requiring increased verbal cues and time to complete), functional transfer supine to EOB (verbal/tactile cues and retraining for transfers from various surfaces d/t decreased balance/sequencing for safety) standing tolerance tasks (addressed posture, balance and activity tolerance d/t impact on ADLs and functional activity - required use of ww in standing - in preparation for ambulation to/ from bathroom; cuing on posture, w/w management and safety) - skilled cuing on hand placement, posture, body mechanics, energy conservation techniques and safety during functional mobility and ADLs.    Therapist facilitated self-care retraining: UB/LB self-care tasks (downing gown, and socks seated EOB), toileting hygiene task and grooming tasks while educating pt on modified techniques, posture, safety and energy conservation techniques. Skilled monitoring of BP, HR, O2 sats and pts response to treatment (monitored O2 - stats above). Rehab Potential: Good  for established goals       LTG: maximize independence with ADLs to return to PLOF    Patient and/or family were instructed on functional diagnosis, prognosis/goals and OT plan of care. Demonstrated fair understanding. [] Malnutrition indicators have been identified and nursing has been notified to ensure a dietitian consult is ordered. ·  Eval Complexity: Low     Evaluation time includes thorough review of current medical information, gathering information on past medical & social history & PLOF, completion of standardized testing, informal observation of tasks, consultation with other medical professions/disciplines, assessment of data & development of POC/goals. Time In: 1500  Time Out: 1530  Total Treatment Time: 15    Min Units   OT Eval Low 41598  x     OT Eval Medium 80970      OT Eval High 63597      OT Re-Eval [id number]       Therapeutic Ex 81595       Therapeutic Activities 67074       ADL/Self Care 07570  15  1   Orthotic Management 77907       Manual [id number]     Neuro Re-Ed 46216       Non-Billable Time          Evaluation Time additionally includes thorough review of current medical information, gathering information on past medical history/social history and prior level of function, interpretation of standardized testing/informal observation of tasks, assessment of data and development of plan of care and goals.          [facility], MSOT OTR/L; [id number]

## 2022-06-10 NOTE — H&P
Hospital Medicine History & Physical      PCP: Franklyn Canela MD    Date of Admission: 2022    Date of Service: .Inga 10,  2022    Chief Complaint:   CHANGE IN MENTAL STATUS AND FALLS      History Of Present Illness:     81 y.o. female presented with  FREQUENT FALLS, HISTORY GIVEN BY . HE STATES THAT SHE WILL JUST BE WALKING AND HER LEGS WILL GIVE OUT AND SHE WILL FALL,  SHE HAS HIT HER HEAD SEVERAL TIMES, NO LOC, SHE DOES NOT LOSE CONSCIOUSNESS. HE SAID HE HAS ASKED HIS DOCTOR FOR HELP WITH HER AT HOME AND HE HAS NOT BEEN SUCCESSFUL. 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 Authorizing Provider   famotidine (PEPCID) 20 MG tablet Take 1 tablet by mouth 2 times daily for 7 days 19  REGINA Berry - CNP   famotidine (PEPCID) 20 MG tablet Take 1 tablet by mouth 2 times daily for 7 days 19  Sepideh Birmingham,    hydrocortisone 1 % ointment Apply topically 2 times daily. 19   Sepideh Birmingham,    colestipol (COLESTID) 1 G tablet Take 1 g by mouth 2 times daily. Historical Provider, MD   folic acid (FOLVITE) 1 MG tablet Take 1 tablet by mouth daily. 4/3/15   Norma Herman, DO   Cyanocobalamin (VITAMIN B-12) 50 MCG tablet Take 1 tablet by mouth daily.  4/3/15 Macie Read,    clopidogrel (PLAVIX) 75 MG tablet Take 1 tablet by mouth daily. 4/3/15   Norma Doran DO   losartan (COZAAR) 50 MG tablet Take 1 tablet by mouth daily. 3/31/15   Norma Doran DO   hydrochlorothiazide (HYDRODIURIL) 12.5 MG tablet Take 1 tablet by mouth Twice a Week. 4/2/15   Norma Doran DO   metoprolol (TOPROL-XL) 25 MG XL tablet Take 1 tablet by mouth daily. 3/31/15   Norma Doran DO   amLODIPine (NORVASC) 2.5 MG tablet Take 1 tablet by mouth daily. 3/31/15   Norma Doran DO   FLUoxetine (PROZAC) 20 MG capsule Take 20 mg by mouth daily. Historical Provider, MD   aspirin 81 MG EC tablet Take 81 mg by mouth daily. Historical Provider, MD   [facility] Liver Oil 1000 MG CAPS Take 1 capsule by mouth daily. Historical Provider, MD   loperamide (IMODIUM) 2 MG capsule Take 2 mg by mouth 4 times daily as needed for Diarrhea. Historical Provider, MD   oxybutynin (DITROPAN) 5 MG tablet Take 5 mg by mouth 2 times daily. Miguel Gómez MD   metFORMIN (GLUCOPHAGE) 1000 MG tablet Take 1,000 mg by mouth 2 times daily (with meals). Historical Provider, MD   pravastatin (PRAVACHOL) 40 MG tablet Take 40 mg by mouth daily. Historical Provider, MD   Multiple Vitamins-Minerals (THERAPEUTIC MULTIVITAMIN-MINERALS) tablet Take 1 tablet by mouth daily. Last dose 10/3/2014    Historical Provider, MD   glimepiride (AMARYL) 2 MG tablet Take 2 mg by mouth every morning (before breakfast). Historical Provider, MD       Allergies:  Latex    Social History:      The patient currently lives *    TOBACCO:   reports that she quit smoking about 36 years ago. She has a 60.00 pack-year smoking history. She has never used smokeless tobacco.  ETOH:   reports current alcohol use. Family History:    POOR HISTORIAN    REVIEW OF SYSTEMS:   Pertinent positives as noted in the HPI. All other systems reviewed and negative.     PHYSICAL EXAM:    /62   Pulse 78   Temp (!) 96.4 °F (35.8 °C) (Temporal)   Resp 18   Ht 5' 6\" (1.676 m)   Wt 184 lb 8 oz (83.7 kg)   SpO2 94%   BMI 29.78 kg/m²     General appearance:  No apparent distress, appears stated age and cooperative. HEENT:  Normal cephalic, atraumatic without obvious deformity. Pupils equal, round, and reactive to light. Extra ocular muscles intact. Conjunctivae/corneas clear. Neck: Supple, with full range of motion. No jugular venous distention. Trachea midline. Respiratory:  Normal respiratory effort. Clear to auscultation, bilaterally without Rales/Wheezes/Rhonchi. Cardiovascular:  Regular rate and rhythm   Abdomen: Soft, non-tender, non-distended with normal bowel sounds. Musculoskeletal:  No clubbing, cyanosis or edema bilaterally. Skin: Skin color, texture, turgor normal.  No rashes or lesions. Neurologic:  Neurovascularly intact without any focal sensory/motor deficits. Cranial nerves: II-XII intact, grossly non-focal.  Psychiatric:  Alert  TO PERSON AND PLACE NOT TIME         Labs:     Recent Labs     06/09/22  1350   WBC 13.0*   HGB 14.7   HCT 44.6        Recent Labs     06/09/22  1350 06/10/22  0453    141   K 3.4* 3.3*   CL 98 102   CO2 30* 26   BUN 23 19   CREATININE 1.4* 1.1*   CALCIUM 9.7 9.5     Recent Labs     06/09/22  1350   AST 24   ALT 15   BILITOT 0.5   ALKPHOS 79     No results for input(s): INR in the last 72 hours. Recent Labs     06/09/22  1350   CKTOTAL 125       Urinalysis:      Lab Results   Component Value Date    NITRU Negative 06/09/2022    WBCUA NONE 06/09/2022    BACTERIA NONE SEEN 06/09/2022    RBCUA NONE 06/09/2022    BLOODU Negative 06/09/2022    SPECGRAV 1.010 06/09/2022    GLUCOSEU Negative 06/09/2022       Radiology:     CXR: I have reviewed the CXR with the following interpretation:     CT HEAD WO CONTRAST   Final Result   No acute intracranial abnormality. Cortical atrophy and periventricular leukomalacia.          CT CERVICAL SPINE WO CONTRAST   Final Result   No acute abnormality of the cervical spine. Advanced multilevel degenerative changes and facet arthrosis. 1.2 cm calcified right thyroid nodule. Follow-up with non emergent thyroid   sonogram recommended. XR CHEST (2 VW)   Final Result   Minimal opacity at the left lung base, likely atelectasis. Suspect small left pleural effusion. XR KNEE LEFT (1-2 VIEWS)   Final Result   No acute bony abnormalities. 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 Suspect small joint effusion. XR PELVIS (1-2 VIEWS)   Final Result   No acute abnormality of the pelvis. ASSESSMENT:    Active Hospital Problems    Diagnosis Date Noted    Falls frequently [R29.6] 06/09/2022     Priority: Medium   HYPOKALEMIA   II DM   THYROID NODULE   NICK   CKD 3   CHANGE IN MENTAL STATUS   HLD  DIZZINESS  PLAN:  ORTHOSTATICS  SSI  PROZAC   NORVASC   NEURO      DVT Prophylaxis: *LOVENOX  Diet: ADULT DIET; Regular; 4 carb choices (60 gm/meal)  Code Status: Full Code    PT/OT Eval Status:  ORDERED    Dispo - *EVIN    Electronically signed by Basilia Squires DO on 6/10/2022 at 6:24 PM Ezequiel Sandhu       Thank you Sandi Foreman MD for the opportunity to be involved in this patient's care.  If you have any questions or concerns please feel free to contact me at [phone]

## 2022-06-10 NOTE — PROGRESS NOTES
Physical Therapy    Initial Assessment     Name: Elijah Hernandez  : 1935  MRN: [id number]      Date of Service: 6/10/2022    Evaluating PT: Clarke Stovall, PT, DPT [id number]      Room #:  2966/9457-S  Diagnosis:  Falls frequently [R29.6]  Altered mental status, unspecified altered mental status type [R41.82]  PMHx/PSHx:  HTN, Diarrhea, DM, HLD, Stenosis of Intracranial Portions or Left Internal Carotid Artery, Stenosis of Left Middle Cerebral Artery, Stroke  Precautions:  Fall risk, [place], Impulsive, Cognition, Alarm     SUBJECTIVE:    Pt is a questionable historian. Pt reports living with  in a 2 story house with 2 stair(s) and 0 rail(s) to enter. Bed and bath on 1st floor. Pt ambulated with [employer] prior to admission. OBJECTIVE:   Initial Evaluation  Date: 6/10/22 Treatment Date: Short Term/ Long Term   Goals   AM-PAC 6 Clicks 16/10     Was pt agreeable to Eval/treatment? Yes     Does pt have pain? Pt reported no pain at baseline; 10/10 L knee pain during activity     Bed Mobility  Rolling: NT  Supine to sit: SBA  Sit to supine: SBA  Scooting: SBA  Rolling: Independent  Supine to sit: Independent  Sit to supine: Independent  Scooting: Independent   Transfers Sit to stand: Mod A  Stand to sit: Min A  Stand pivot: NT  Sit to stand: Supervision  Stand to sit: Supervision  Stand pivot: Supervision   Ambulation   NT  >50 feet with SBA with [employer]   Stair negotiation: ascended and descended NT  >4 step(s) with 2 rail(s) with Min A   ROM BUE: WFL  BLE: WFL   L Knee extension limited     Strength BUE: See OT note   BLE: RLE Knee extension 2/5 limited by pain. Hip flexion 4/5  LLE: Grossly 5/5   Testing limited by pt cognition and limited attenition     Balance Sitting EOB: SBA  Dynamic Standing: Min A with [employer]  Sitting EOB: Independent  Dynamic Standing: Supervision with [employer]     Pt is A & O x: Grossly x3 to person, place, year.  Pt knew she was in a hospital and why but required verbal cuing to get specific hospital. Unable to state month with cuing or recall. Sensation: Pt reports no numbness or tingling. Edema: NT    Patient education  Pt educated on safety, role of PT in the hospital, and pursed lip breathing. Patient response to education:   Pt verbalized understanding Pt demonstrated skill Pt requires further education in this area   Yes Required repetitive cuing Yes     ASSESSMENT:    Conditions Requiring Skilled Therapeutic Intervention:    [x]Decreased strength     [x]Decreased ROM  [x]Decreased functional mobility  [x]Decreased balance   [x]Decreased endurance   []Decreased posture  []Decreased sensation  []Decreased coordination   []Decreased vision  [x]Decreased safety awareness   [x]Increased pain      Comments:    Pt was laying in bed upon room entry; agreeable to evaluation and treatment. Pt was disoriented, easily distracted, and impulsive. Required repetitive cuing to redirect and would occasionally get agitated when redirected. Pt sat up EOB, SBA due to impulsiveness. Pt was slow and unsteady standing up due to L knee pain and weakness. Pt was slightly unsteady standing; required light assistance due to weakness. Pt displayed symptoms of SOB and reported fatigue; insisted on sitting down. Pt stood longer the second time but had to sit down for the same reason. Symptoms resolved after 1 minute of seated rest and deep breathing. Pt had previously soiled bed; pt was cleaned up and bed sheets were changed. Pt stood at 3rd time and was able to sidestep at EOB. Steps were small and unsteady. Pt only advanced several feet before sitting. Pt activity was limited by fatigue and concerns of safety due to impulsiveness. Pt was assisted back into bed. Pt seemed to calm down and was appreciative of the help. All questions and concerns were addressed and accommodations were met. Pt was left in bed and instructed not to leave without assistance at end of session; Alarm activated. RN notified of soiled bed.      Treatment: Patient practiced and was instructed in the following treatment:     Therapeutic activities:  o Transfers: Pt performed sit<>stand transfer for 3 reps. Pt was cued for hand placement and safety during sit <> stand transfers. Pt sidestepped at EOB as she was cued for technique with [employer].  o Standing Balance: Pt stood for 2 reps (1min, 4 minutes). (To improve strength, endurance, balance, and tolerance to upright). Pt was cued for hand placement and posture.   o Education: Pt was educated on [employer] management, role of PT in the hospital, and deep breathing for SOB. Pt's/family goals:  1. Return to home. Prognosis is Fair for reaching above PT goals. Patient and or family understand(s) diagnosis, prognosis, and plan of care. Yes    PHYSICAL THERAPY PLAN OF CARE:    PT POC is established based on physician order and patient diagnosis     Referring provider/PT Order:    Start   Ordering Provider    06/10/22 0030  PT evaluation and treat  Start:  06/10/22 0030,   End:  06/10/22 0030,   ONE TIME,   Standing Count:  1 Occurrences,   R         REGINA Pagan - CNP      Diagnosis:  Falls frequently [R29.6]  Altered mental status, unspecified altered mental status type [R41.82]  Specific instructions for next treatment:  Progression of transfers and initiate ambulation if less impulsive and distracted.      Current Treatment Recommendations:     [x] Strengthening to improve independence with functional mobility   [x] ROM to improve independence with functional mobility   [x] Balance Training to improve static/dynamic balance and to reduce fall risk  [x] Endurance Training to improve activity tolerance during functional mobility   [x] Transfer Training to improve safety and independence with all functional transfers   [x] Gait Training to improve gait mechanics, endurance and assess need for appropriate assistive device  [x] Stair Training in preparation for safe discharge home and/or into the community   [] Positioning to prevent skin breakdown and contractures  [x] Safety and Education Training   [x] Patient/Caregiver Education   [x] HEP  [] Other     PT long term treatment goals are located in above grid    Frequency of treatments: 2-5x/week x 1-2 days. Time in  1340  Time out  1400    Total Treatment Time  0 minutes     Evaluation Time includes thorough review of current medical information, gathering information on past medical history/social history and prior level of function, completion of standardized testing/informal observation of tasks, assessment of data and education on plan of care and goals.     CPT codes:  [x] Low Complexity PT evaluation 71846  [] Moderate Complexity PT evaluation 14824  [] High Complexity PT evaluation 25355  [] PT Re-evaluation 88780  [] Gait training 78615 0 minutes  [] Manual therapy 67081 0 minutes  [] Therapeutic activities 29318 0 minutes  [] Therapeutic exercises 58424 0 minutes  [] Neuromuscular reeducation 40952 0 minutes     Jesus Form, PT, DPT  [id number]      Rekha Rosa, SPT

## 2022-06-10 NOTE — FLOWSHEET NOTE
06/10/22 0845   Vital Signs   Orthostatic B/P and Pulse?  Yes   Blood Pressure Lying 173/83   Pulse Lying 82 PER MINUTE   Blood Pressure Sitting 141/82   Pulse Sitting 92 PER MINUTE   Blood Pressure Standing 183/109   Pulse Standing 98 PER MINUTE

## 2022-06-10 NOTE — CONSULTS
NEUROLOGY CONSULT NOTE      Requesting Physician: Bright Gonzalez DO    Reason for Consult:  Evaluate for DIZZINESS AND FALLS. History of Present Illness:  Jazmine Proctor is a 81 y.o. female  with h/o DM, HLD, HTN, CVA, left ICA stenosis, and left MCA stenosis who was admitted to [facility]  on 2022 with presentation of frequent falls and increased confusion. Onset of symptoms as well as several days prior to presentation with gradual onset of increasing confusion along with increasing falls with report of some dizziness at times. On day of admission patient had a fall in her bathroom while walking when she twisted her left knee resulting in fall. No report of any associated injuries or with her fall. There is no loss of consciousness or dizziness associated. Patient denies any weakness. States that typically has left knee giving out leading to her falls. 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 She did have CT of the cervical spine showing advanced multilevel degenerative changes and facet arthrosis. CT head was negative for any acute intracranial abnormalities.         Past Medical History:        Diagnosis Date    Diabetes mellitus ([facility] [address]     Diarrhea     procedure 10/8/2014    Hyperlipidemia     Hypertension     Stenosis of intracranial portions of left internal carotid artery 2015    Stenosis of left middle cerebral artery 4/3/2015    Stroke [facility])            Procedure Laterality Date     SECTION      CHOLECYSTECTOMY      COLONOSCOPY      EYE SURGERY      bilateral w/ implants    HYSTERECTOMY (CERVIX STATUS UNKNOWN)         Social History:  Social History     Tobacco Use   Smoking Status Former Smoker    Packs/day: 2.00    Years: 30.00    Pack years: 60.00    Quit date: 10/3/1985    Years since quittin.7   Smokeless Tobacco Never Used     Social History     Substance and Sexual Activity   Alcohol Use Yes    Comment: rarely     Social History     Substance and Sexual Activity   Drug Use No         Family History:   History reviewed. No pertinent family history. Review of Systems:  All systems reviewed are negative except what is mentioned in history of present illness. Allergies: Allergies   Allergen Reactions    Latex      Itchy         Current Medications:   amLODIPine (NORVASC) tablet 2.5 mg, Daily  aspirin EC tablet 81 mg, Daily  clopidogrel (PLAVIX) tablet 75 mg, Daily  [Held by provider] hydroCHLOROthiazide (HYDRODIURIL) tablet 12.5 mg, Once per day on Mon Thu  losartan (COZAAR) tablet 50 mg, Daily  metoprolol succinate (TOPROL XL) extended release tablet 25 mg, Daily  oxybutynin (DITROPAN) tablet 5 mg, BID  pravastatin (PRAVACHOL) tablet 40 mg, Daily  sodium chloride flush 0.9 % injection 5-40 mL, 2 times per day  sodium chloride flush 0.9 % injection 10 mL, PRN  0.9 % sodium chloride infusion, PRN  enoxaparin (LOVENOX) injection 40 mg, Daily  polyethylene glycol (GLYCOLAX) packet 17 g, Daily PRN  acetaminophen (TYLENOL) tablet 650 mg, Q6H PRN   Or  acetaminophen (TYLENOL) suppository 650 mg, Q6H PRN  glucose chewable tablet 16 g, PRN  dextrose bolus 10% 125 mL, PRN   Or  dextrose bolus 10% 250 mL, PRN  glucagon (rDNA) injection 1 mg, PRN  dextrose 5 % solution, PRN  pantoprazole (PROTONIX) tablet 40 mg, QAM AC  polyethylene glycol (GLYCOLAX) packet 17 g, Daily  insulin lispro (HUMALOG) injection vial 0-12 Units, TID WC  insulin lispro (HUMALOG) injection vial 0-6 Units, Nightly  FLUoxetine (PROZAC) capsule 40 mg, Daily  docusate sodium (COLACE) capsule 100 mg, Nightly         Physical Exam:  /62   Pulse 78   Temp (!) 96.4 °F (35.8 °C) (Temporal)   Resp 18   Ht 5' 6\" (1.676 m)   Wt 184 lb 8 oz (83.7 kg)   SpO2 94%   BMI 29.78 kg/m²  I Body mass index is 29.78 kg/m².  I   Wt Readings from Last 1 Encounters:   06/10/22 184 lb 8 oz (83.7 kg) HEENT: Normocephalic, atraumatic, no lesions or abnormalities noted. Neck:  supple with full ROM; no masses, nodes or bruits; no cervical tenderness on palpation. Lungs:  clear to auscultation  bilaterally     CV: RRR without gallops or murmurs     Extremities: no c/c/e      Back: no tenderness with palpation / Tenderness per diagram ; no scoliosis; no kyphosis negative straight leg raising normal ROM in low back, pelvis, hips         Neurologic Exam   Mental Status:  Patient was alert, responsive, oriented, appropriate, answering questions, and following commands. Speech was fluent with normal sensorium and cognition. Cranial Nerves: Pupils were equal round and reactive to light;    Visual fields were full on confrontation; Extraocular movements were intact; no nystagmus; Intact facial sensation to temp, pinprick, and light touch; Symmetric facial movements with good lip and eye closure bilaterally; Hearing was intact; Yanes was midline;    Normal palatal elevation with midline uvula  Trapezius strength of 5/5. Tongue was midline with no atrophy or fasciculations  Motor Exam:  Strength was 5/5 throughout; normal tone and bulk; no atrophy or fasiculations noted. Sensory Exam:  Normal sensation to light touch, pin-prick, and temperature; No sensory extinction. Cerebella Exam:  Intact finger-nose-finger, rapidly alternating movements, fine motor movements, and heel-down-shin maneuvers; No cerebellar rebound or drift; No tremors or abnormal movements    Gait:  Gait was not tested. Reflexes: Absent in achilles in left patellar and bilateral achilles tendons; Babinski is negative.      Labs:    CBC:   Recent Labs     06/09/22  1350   WBC 13.0*   HGB 14.7      MCV 92.5   MCH 30.5   MCHC 33.0   RDW 12.8     CMP:  Recent Labs     06/09/22  1350 06/10/22  0453    141   K 3.4* 3.3*   CL 98 102   CO2 30* 26   BUN 23 19   CREATININE 1.4* 1.1*   GFRAA 43 57   LABGLOM 36 47 GLUCOSE 199* 144*   CALCIUM 9.7 9.5     Liver:   Recent Labs     06/09/22  1350   AST 24   ALT 15   ALKPHOS 79   PROT 7.1   LABALBU 3.9   BILITOT 0.5     INR: No results for input(s): PROTIME, INR in the last 72 hours. ToxicologyNo results for input(s): PHENYTOIN, CARBTOT, PHENOBARB, VALPROATE in the last 72 hours. Invalid input(s): LAMOTRIG,  KEPPRA  No results for input(s): AMPMETHURSCR, BARBTQTU, BDZQTU, CANNABQUANT, COCMETQTU, OPIAU, PCPQUANT in the last 72 hours. Radiology:  XR CHEST (2 VW)    Result Date: 6/9/2022  EXAMINATION: TWO XRAY VIEWS OF THE CHEST 6/9/2022 3:38 pm COMPARISON: None. HISTORY: ORDERING SYSTEM PROVIDED HISTORY: fall TECHNOLOGIST PROVIDED HISTORY: Reason for exam:->fall What reading provider will be dictating this exam?->CRC FINDINGS: Patient is in suboptimal inspiration resulting in crowding of vascular markings. Minimal opacity at the left lung base. The right lung is clear. There is no pneumothorax. Minimal blunting of the left costophrenic angle. Minimal opacity at the left lung base, likely atelectasis. Suspect small left pleural effusion. XR PELVIS (1-2 VIEWS)    Result Date: 6/9/2022  EXAMINATION: ONE XRAY VIEW OF THE PELVIS 6/9/2022 3:38 pm COMPARISON: None. HISTORY: ORDERING SYSTEM PROVIDED HISTORY: fall TECHNOLOGIST PROVIDED HISTORY: Reason for exam:->fall What reading provider will be dictating this exam?->CRC FINDINGS: No evidence of pelvic fracture. Bilateral hips demonstrate normal alignment. No focal osseous lesion. SI joints are symmetric. There are degenerative changes in the visualized lower lumbar spine. No acute abnormality of the pelvis. XR KNEE LEFT (1-2 VIEWS)    Result Date: 6/9/2022  EXAMINATION: TWO XRAY VIEWS OF THE LEFT KNEE 6/9/2022 3:38 pm COMPARISON: None.  HISTORY: ORDERING SYSTEM PROVIDED HISTORY: fall TECHNOLOGIST PROVIDED HISTORY: Reason for exam:->fall What reading provider will be dictating this exam?->CRC FINDINGS: There are no acute fractures or dislocations. 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 There is mild suprapatellar soft tissue swelling. No acute bony abnormalities. 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 Suspect small joint effusion. CT HEAD WO CONTRAST    Result Date: 6/9/2022  EXAMINATION: CT OF THE HEAD WITHOUT CONTRAST  6/9/2022 5:05 pm TECHNIQUE: CT of the head was performed without the administration of intravenous contrast. Automated exposure control, iterative reconstruction, and/or weight based adjustment of the mA/kV was utilized to reduce the radiation dose to as low as reasonably achievable. COMPARISON: July 29, 2019 HISTORY: ORDERING SYSTEM PROVIDED HISTORY: fall TECHNOLOGIST PROVIDED HISTORY: Has a \"code stroke\" or \"stroke alert\" been called? ->No Reason for exam:->fall Decision Support Exception - unselect if not a suspected or confirmed emergency medical condition->Emergency Medical Condition (MA) What reading provider will be dictating this exam?->CRC FINDINGS: BRAIN/VENTRICLES: There are age related cortical atrophy and periventricular white matter ischemic changes. 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 There is no acute intracranial hemorrhage, mass effect or midline shift. No abnormal extra-axial fluid collection. The gray-white differentiation is maintained without evidence of an acute infarct. There is no evidence of hydrocephalus. ORBITS: The visualized portion of the orbits demonstrate no acute abnormality. SINUSES: The visualized paranasal sinuses and mastoid air cells demonstrate no acute abnormality. SOFT TISSUES/SKULL:  No acute abnormality of the visualized skull or soft tissues. No acute intracranial abnormality. Cortical atrophy and periventricular leukomalacia.      CT CERVICAL SPINE WO CONTRAST    Result Date: 6/9/2022  EXAMINATION: CT OF THE CERVICAL SPINE WITHOUT CONTRAST 6/9/2022 5:05 pm TECHNIQUE: CT of the cervical spine was performed without the administration of intravenous contrast. Multiplanar reformatted images are provided for review. Automated exposure control, iterative reconstruction, and/or weight based adjustment of the mA/kV was utilized to reduce the radiation dose to as low as reasonably achievable. COMPARISON: July 29, 2019 HISTORY: ORDERING SYSTEM PROVIDED HISTORY: fall TECHNOLOGIST PROVIDED HISTORY: Reason for exam:->fall Decision Support Exception - unselect if not a suspected or confirmed emergency medical condition->Emergency Medical Condition (MA) What reading provider will be dictating this exam?->CRC FINDINGS: BONES/ALIGNMENT: There is no acute fracture or traumatic malalignment. DEGENERATIVE CHANGES: There are advanced multilevel degenerative changes and facet arthrosis. Partial fusion of C4 and C5 vertebral bodies. SOFT TISSUES: There is no prevertebral soft tissue swelling. There is a 1.2 cm partially calcified right thyroid nodule. No acute abnormality of the cervical spine. Advanced multilevel degenerative changes and facet arthrosis. 1.2 cm calcified right thyroid nodule. Follow-up with non emergent thyroid sonogram recommended. The patient's records from referring provider and available information in the EHR was reviewed. Impression:  1. Falls  2. Left Knee arthropathy: pain over the last year with   3. Spinal arthropathy  4. Cerebrovascular Disease with h/o CVA involving resolved left sided weakness; left ICA stenosis. Principal Problem:    Falls frequently  Resolved Problems:    * No resolved hospital problems. *      Recommendations:                                            1. XR of hips/pelvis and Lumbar spine. 2. PT/OT eval and therapy;   3. Orthopedic consultation for severe left knee arthropathy. 4. Continue current medical management. .  5. Further pending results of studies.  .       It was my pleasure to evaluate Marcela Rodriguez today.  Please call with questions.       Electronically signed by Rachel Pallas, MD on 6/10/2022 at 7:58 PM

## 2022-06-11 ENCOUNTER — APPOINTMENT (OUTPATIENT)
Dept: GENERAL RADIOLOGY | Age: 87
DRG: 057 | End: 2022-06-11
Payer: MEDICARE

## 2022-06-11 LAB
ANION GAP SERPL CALCULATED.3IONS-SCNC: 15 MMOL/L (ref 7–16)
BUN BLDV-MCNC: 20 MG/DL (ref 6–23)
CALCIUM SERPL-MCNC: 9.5 MG/DL (ref 8.6–10.2)
CHLORIDE BLD-SCNC: 103 MMOL/L (ref 98–107)
CO2: 19 MMOL/L (ref 22–29)
CREAT SERPL-MCNC: 1.3 MG/DL (ref 0.5–1)
FOLATE: >20 NG/ML (ref 4.8–24.2)
GFR AFRICAN AMERICAN: 47
GFR NON-AFRICAN AMERICAN: 39 ML/MIN/1.73
GLUCOSE BLD-MCNC: 165 MG/DL (ref 74–99)
MAGNESIUM: 2.1 MG/DL (ref 1.6–2.6)
METER GLUCOSE: 107 MG/DL (ref 74–99)
METER GLUCOSE: 163 MG/DL (ref 74–99)
METER GLUCOSE: 184 MG/DL (ref 74–99)
METER GLUCOSE: 202 MG/DL (ref 74–99)
POTASSIUM SERPL-SCNC: 3.9 MMOL/L (ref 3.5–5)
SODIUM BLD-SCNC: 137 MMOL/L (ref 132–146)
TSH SERPL DL<=0.05 MIU/L-ACNC: 2.37 UIU/ML (ref 0.27–4.2)
VITAMIN B-12: 1398 PG/ML (ref 211–946)

## 2022-06-11 PROCEDURE — 96372 THER/PROPH/DIAG INJ SC/IM: CPT

## 2022-06-11 PROCEDURE — G0378 HOSPITAL OBSERVATION PER HR: HCPCS

## 2022-06-11 PROCEDURE — 6360000002 HC RX W HCPCS: Performed by: NURSE PRACTITIONER

## 2022-06-11 PROCEDURE — 2580000003 HC RX 258: Performed by: NURSE PRACTITIONER

## 2022-06-11 PROCEDURE — 6370000000 HC RX 637 (ALT 250 FOR IP): Performed by: INTERNAL MEDICINE

## 2022-06-11 PROCEDURE — 99233 SBSQ HOSP IP/OBS HIGH 50: CPT | Performed by: PHYSICIAN ASSISTANT

## 2022-06-11 PROCEDURE — 6370000000 HC RX 637 (ALT 250 FOR IP): Performed by: NURSE PRACTITIONER

## 2022-06-11 PROCEDURE — 82962 GLUCOSE BLOOD TEST: CPT

## 2022-06-11 PROCEDURE — 80048 BASIC METABOLIC PNL TOTAL CA: CPT

## 2022-06-11 PROCEDURE — 84443 ASSAY THYROID STIM HORMONE: CPT

## 2022-06-11 PROCEDURE — 36415 COLL VENOUS BLD VENIPUNCTURE: CPT

## 2022-06-11 PROCEDURE — 73521 X-RAY EXAM HIPS BI 2 VIEWS: CPT

## 2022-06-11 PROCEDURE — 96375 TX/PRO/DX INJ NEW DRUG ADDON: CPT

## 2022-06-11 PROCEDURE — 83735 ASSAY OF MAGNESIUM: CPT

## 2022-06-11 PROCEDURE — 73562 X-RAY EXAM OF KNEE 3: CPT

## 2022-06-11 PROCEDURE — 72110 X-RAY EXAM L-2 SPINE 4/>VWS: CPT

## 2022-06-11 PROCEDURE — 82746 ASSAY OF FOLIC ACID SERUM: CPT

## 2022-06-11 PROCEDURE — 82607 VITAMIN B-12: CPT

## 2022-06-11 RX ORDER — HYDRALAZINE HYDROCHLORIDE 20 MG/ML
10 INJECTION INTRAMUSCULAR; INTRAVENOUS EVERY 6 HOURS PRN
Status: DISCONTINUED | OUTPATIENT
Start: 2022-06-11 | End: 2022-06-14 | Stop reason: HOSPADM

## 2022-06-11 RX ADMIN — ENOXAPARIN SODIUM 40 MG: 100 INJECTION SUBCUTANEOUS at 09:18

## 2022-06-11 RX ADMIN — ASPIRIN 81 MG: 81 TABLET, COATED ORAL at 09:18

## 2022-06-11 RX ADMIN — PRAVASTATIN SODIUM 40 MG: 20 TABLET ORAL at 09:18

## 2022-06-11 RX ADMIN — AMLODIPINE BESYLATE 2.5 MG: 2.5 TABLET ORAL at 09:18

## 2022-06-11 RX ADMIN — INSULIN LISPRO 1 UNITS: 100 INJECTION, SOLUTION INTRAVENOUS; SUBCUTANEOUS at 21:21

## 2022-06-11 RX ADMIN — HYDRALAZINE HYDROCHLORIDE 10 MG: 20 INJECTION INTRAMUSCULAR; INTRAVENOUS at 22:28

## 2022-06-11 RX ADMIN — INSULIN LISPRO 4 UNITS: 100 INJECTION, SOLUTION INTRAVENOUS; SUBCUTANEOUS at 14:30

## 2022-06-11 RX ADMIN — LOSARTAN POTASSIUM 50 MG: 50 TABLET, FILM COATED ORAL at 09:18

## 2022-06-11 RX ADMIN — DOCUSATE SODIUM 100 MG: 100 CAPSULE, LIQUID FILLED ORAL at 21:16

## 2022-06-11 RX ADMIN — FLUOXETINE 40 MG: 20 CAPSULE ORAL at 09:17

## 2022-06-11 RX ADMIN — METOPROLOL SUCCINATE 25 MG: 25 TABLET, EXTENDED RELEASE ORAL at 09:17

## 2022-06-11 RX ADMIN — INSULIN LISPRO 2 UNITS: 100 INJECTION, SOLUTION INTRAVENOUS; SUBCUTANEOUS at 09:19

## 2022-06-11 RX ADMIN — CLOPIDOGREL BISULFATE 75 MG: 75 TABLET ORAL at 09:17

## 2022-06-11 RX ADMIN — PANTOPRAZOLE SODIUM 40 MG: 40 TABLET, DELAYED RELEASE ORAL at 05:35

## 2022-06-11 RX ADMIN — OXYBUTYNIN CHLORIDE 5 MG: 5 TABLET ORAL at 09:18

## 2022-06-11 RX ADMIN — SODIUM CHLORIDE, PRESERVATIVE FREE 10 ML: 5 INJECTION INTRAVENOUS at 21:17

## 2022-06-11 RX ADMIN — OXYBUTYNIN CHLORIDE 5 MG: 5 TABLET ORAL at 21:17

## 2022-06-11 RX ADMIN — SODIUM CHLORIDE, PRESERVATIVE FREE 10 ML: 5 INJECTION INTRAVENOUS at 09:19

## 2022-06-11 ASSESSMENT — PAIN SCALES - GENERAL: PAINLEVEL_OUTOF10: 0

## 2022-06-11 NOTE — PLAN OF CARE
Problem: Skin/Tissue Integrity  Goal: Absence of new skin breakdown  Description: 1. Monitor for areas of redness and/or skin breakdown  2. Assess vascular access sites hourly  3. Every 4-6 hours minimum:  Change oxygen saturation probe site  4. Every 4-6 hours:  If on nasal continuous positive airway pressure, respiratory therapy assess nares and determine need for appliance change or resting period.   Outcome: Progressing     Problem: Safety - Adult  Goal: Free from fall injury  Outcome: Progressing     Problem: ABCDS Injury Assessment  Goal: Absence of physical injury  Outcome: Progressing     Problem: Pain  Goal: Verbalizes/displays adequate comfort level or baseline comfort level  Outcome: Progressing     Problem: Chronic Conditions and Co-morbidities  Goal: Patient's chronic conditions and co-morbidity symptoms are monitored and maintained or improved  Outcome: Progressing

## 2022-06-11 NOTE — PROGRESS NOTES
Hospitalist Progress Note      PCP: Abby Casanova MD    Date of Admission: 6/9/2022        Hospital Course:  **81 y.o. female presented with  FREQUENT FALLS, HISTORY GIVEN BY . HE STATES THAT SHE WILL JUST BE WALKING AND HER LEGS WILL GIVE OUT AND SHE WILL FALL,  SHE HAS HIT HER HEAD SEVERAL TIMES, NO LOC, SHE DOES NOT LOSE CONSCIOUSNESS. HE SAID HE HAS ASKED HIS DOCTOR FOR HELP WITH HER AT HOME AND HE HAS NOT BEEN SUCCESSFUL.   *        Subjective: *DOWN FOR TESTING**           Medications:  Reviewed    Infusion Medications    sodium chloride      dextrose       Scheduled Medications    amLODIPine  2.5 mg Oral Daily    aspirin  81 mg Oral Daily    clopidogrel  75 mg Oral Daily    [Held by provider] hydroCHLOROthiazide  12.5 mg Oral Once per day on Mon Thu    losartan  50 mg Oral Daily    metoprolol succinate  25 mg Oral Daily    oxybutynin  5 mg Oral BID    pravastatin  40 mg Oral Daily    sodium chloride flush  5-40 mL IntraVENous 2 times per day    enoxaparin  40 mg SubCUTAneous Daily    pantoprazole  40 mg Oral QAM AC    polyethylene glycol  17 g Oral Daily    insulin lispro  0-12 Units SubCUTAneous TID WC    insulin lispro  0-6 Units SubCUTAneous Nightly    FLUoxetine  40 mg Oral Daily    docusate sodium  100 mg Oral Nightly     PRN Meds: sodium chloride flush, sodium chloride, polyethylene glycol, acetaminophen **OR** acetaminophen, glucose, dextrose bolus **OR** dextrose bolus, glucagon (rDNA), dextrose      Intake/Output Summary (Last 24 hours) at 6/11/2022 1409  Last data filed at 6/11/2022 1047  Gross per 24 hour   Intake 300 ml   Output --   Net 300 ml       Exam:    BP (!) 160/90   Pulse 76   Temp 98.2 °F (36.8 °C) (Oral)   Resp 18   Ht 5' 6\" (1.676 m)   Wt 184 lb 8 oz (83.7 kg)   SpO2 95%   BMI 29.78 kg/m²                 Labs:   Recent Labs     06/09/22  1350   WBC 13.0*   HGB 14.7   HCT 44.6        Recent Labs     06/09/22  1350 06/10/22  3326 06/11/22  0430    141 137   K 3.4* 3.3* 3.9   CL 98 102 103   CO2 30* 26 19*   BUN 23 19 20   CREATININE 1.4* 1.1* 1.3*   CALCIUM 9.7 9.5 9.5     Recent Labs     06/09/22  1350   AST 24   ALT 15   BILITOT 0.5   ALKPHOS 79     No results for input(s): INR in the last 72 hours. Recent Labs     06/09/22  1350   CKTOTAL 125     Recent Labs     06/09/22  1350   AST 24   ALT 15   BILITOT 0.5   ALKPHOS 79     No results for input(s): LACTA in the last 72 hours. Lab Results   Component Value Date    LABURIC 7.0 (H) 10/14/2019     No results found for: AMMONIA    Assessment:    Active Hospital Problems    Diagnosis Date Noted    Falls frequently [R29.6] 06/09/2022     Priority: Medium   HYPOKALEMIA   II DM   THYROID NODULE   NICK   CKD 3   CHANGE IN MENTAL STATUS   HLD  DIZZINESS  PLAN:  ORTHOSTATICS  SSI  PROZAC   NORVASC   NEURO        DVT Prophylaxis: *LOVENOX  Diet: ADULT DIET;  Regular; 4 carb choices (60 gm/meal)  Code Status: Full Code     PT/OT Eval Status:  ORDERED     Dispo - *EVIN    Electronically signed by Ana Dwyer DO on 6/11/2022 at 2:09 PM Jose pérez

## 2022-06-11 NOTE — PLAN OF CARE
Problem: Skin/Tissue Integrity  Goal: Absence of new skin breakdown  Description: 1. Monitor for areas of redness and/or skin breakdown  2. Assess vascular access sites hourly  3. Every 4-6 hours minimum:  Change oxygen saturation probe site  4. Every 4-6 hours:  If on nasal continuous positive airway pressure, respiratory therapy assess nares and determine need for appliance change or resting period.   6/11/2022 1101 by Cynthia Correia RN  Outcome: Progressing  6/10/2022 2150 by Anders Marlow RN  Outcome: Progressing     Problem: Safety - Adult  Goal: Free from fall injury  6/11/2022 1101 by Cynthia Correia RN  Outcome: Progressing  Flowsheets  Taken 6/10/2022 2223 by Anders Marlow RN  Free From Fall Injury: Instruct family/caregiver on patient safety  Taken 6/10/2022 2152 by Anders Marlow RN  Free From Fall Injury: Instruct family/caregiver on patient safety  6/10/2022 2150 by Anders Marlow RN  Outcome: Progressing     Problem: ABCDS Injury Assessment  Goal: Absence of physical injury  6/11/2022 1101 by Cynthia Correia RN  Outcome: Progressing  Flowsheets  Taken 6/10/2022 2223 by Anders Marlow RN  Absence of Physical Injury: Implement safety measures based on patient assessment  Taken 6/10/2022 2152 by Anders Marlow RN  Absence of Physical Injury: Implement safety measures based on patient assessment  6/10/2022 2150 by Anders Marlow RN  Outcome: Progressing     Problem: Pain  Goal: Verbalizes/displays adequate comfort level or baseline comfort level  6/11/2022 1101 by Cynthia Correia RN  Outcome: Progressing  6/10/2022 2150 by Anders Marlow RN  Outcome: Progressing     Problem: Chronic Conditions and Co-morbidities  Goal: Patient's chronic conditions and co-morbidity symptoms are monitored and maintained or improved  6/10/2022 2150 by Anders Marlow RN  Outcome: Progressing

## 2022-06-11 NOTE — PROGRESS NOTES
[address]  Neurology follow up     Date:  6/11/2022  Patient Name:  Mirian Cordova  YOB: 1935  MRN: [id number]     PCP:  Carlene Frankel, MD   Referring:  No ref. 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 EMG in 2015 suggestive of a Left L5 motor radiculopathy, has a hx of DM so may also have an underlying PN as well given absent vibration at the ankles b/l. I question the presence of a frontal lobe gait apraxia related to suspected dementia. Formal cog 7 years ago with mild deficits-- has had more significant decline over the last 6 months. B/l grasp reflex on exam. Hx of R internal capsule stroke. Some mild weakness noted on L on exam. Question some residual deficits from prior stroke contributing to gait difficulties as well     Plan  · MRI brain to assess for degree of vascular changes - ? Vascular dementia vs Alzheimer's vs combination of  · MRI L spine- EMG 7 years ago suggestive of a Left L5 motor radiculopathy, with weakness and asymmetric sensory changes in the LLE on exam   · Labs for cognitive decline  · Recommend neuropsych testing, formal cog, cognitive therapy possibly as OP and further dementia workup   · Consider addition of memory agents. · PT/OT    History of Present Illness:  Mirian Cordova is a 81 y.o.  female presenting for evaluation of frequent falls. Patient presents for evaluation of frequent falls and increased confusion. Patient is a poor historian. Her  and son at bedside are fair once.     Through chart review I see that she was seen by neurology back in  and was having frequent falls at that time as well as noted to have mild cognitive difficulties as well. She does have a history of a right internal capsule stroke approximately 7 years ago with reported no residual deficits. She also had an EMG back in  which was suggestive of a left L5 motor radiculopathy. Her son reports that she has had increasing short-term memory difficulties over the last 6 months but states that she has required assistance in ADLs for the last 5 years. She has not driven in several years. She no longer goes to [employer] with her . They deny any depression. Denies any dangerous behaviors, changes in personality. Review of Systems:  + falls frequently  + memory decline     Medical History:   Past Medical History:   Diagnosis Date    Diabetes mellitus ([facility] [address]     Diarrhea     procedure 10/8/2014    Hyperlipidemia     Hypertension     Stenosis of intracranial portions of left internal carotid artery 2015    Stenosis of left middle cerebral artery 4/3/2015    Stroke [facility])         Surgical History:   Past Surgical History:   Procedure Laterality Date     SECTION      CHOLECYSTECTOMY      COLONOSCOPY      EYE SURGERY      bilateral w/ implants    HYSTERECTOMY (CERVIX STATUS UNKNOWN)          Family History:   History reviewed. No pertinent family history.     Social History:  Social History     Tobacco Use    Smoking status: Former Smoker     Packs/day: 2.00     Years: 30.00     Pack years: 60.00     Quit date: 10/3/1985     Years since quittin.7    Smokeless tobacco: Never Used   Substance Use Topics    Alcohol use: Yes     Comment: rarely    Drug use: No        Current Medications:      Current Facility-Administered Medications   Medication Dose Route Frequency Provider Last Rate Last Admin    amLODIPine (NORVASC) tablet 2.5 mg  2.5 mg Oral Daily REGINA Feliz CNP   2.5 mg at 22 0918    aspirin EC tablet 81 mg  81 mg Oral Daily Sky Bulls, APRN - CNP   81 mg at 06/11/22 0918    clopidogrel (PLAVIX) tablet 75 mg  75 mg Oral Daily Sky Bulls, APRN - CNP   75 mg at 06/11/22 0917    [Held by provider] hydroCHLOROthiazide (HYDRODIURIL) tablet 12.5 mg  12.5 mg Oral Once per day on Mon Thu Sky Dominiques, APRN - CNP   12.5 mg at 06/10/22 0203    losartan (COZAAR) tablet 50 mg  50 mg Oral Daily Sky Bulls, APRN - CNP   50 mg at 06/11/22 1665    metoprolol succinate (TOPROL XL) extended release tablet 25 mg  25 mg Oral Daily Sky Bulls, APRN - CNP   25 mg at 06/11/22 0917    oxybutynin (DITROPAN) tablet 5 mg  5 mg Oral BID Sky Dominiques, APRN - CNP   5 mg at 06/11/22 0918    pravastatin (PRAVACHOL) tablet 40 mg  40 mg Oral Daily Sky Dominiques, APRN - CNP   40 mg at 06/11/22 0918    sodium chloride flush 0.9 % injection 5-40 mL  5-40 mL IntraVENous 2 times per day Sky Dominiques, APRN - CNP   10 mL at 06/11/22 0919    sodium chloride flush 0.9 % injection 10 mL  10 mL IntraVENous PRN Sky Dominiques, APRN - CNP   10 mL at 06/10/22 1008    0.9 % sodium chloride infusion   IntraVENous PRN Sky Dominiques, APRN - CNP        enoxaparin (LOVENOX) injection 40 mg  40 mg SubCUTAneous Daily Sky Dominiques, APRN - CNP   40 mg at 06/11/22 0918    polyethylene glycol (GLYCOLAX) packet 17 g  17 g Oral Daily PRN Sky Bulls, APRN - CNP        acetaminophen (TYLENOL) tablet 650 mg  650 mg Oral Q6H PRN Syk Bulls, APRN - CNP        Or    acetaminophen (TYLENOL) suppository 650 mg  650 mg Rectal Q6H PRN Sky Bulls, APRN - CNP        glucose chewable tablet 16 g  4 tablet Oral PRN Sky Bulls, APRN - CNP        dextrose bolus 10% 125 mL  125 mL IntraVENous PRN Sky Bulls, APRN - CNP        Or    dextrose bolus 10% 250 mL  250 mL IntraVENous PRN Sky Bulls, APRN - CNP        glucagon (rDNA) injection 1 mg  1 mg IntraMUSCular PRN Sky Clifford, APRN - CNP        dextrose 5 % solution  100 mL/hr IntraVENous PRN Sky Clifford, APRN - CNP        pantoprazole (PROTONIX) tablet 40 mg  40 mg Oral QAM AC Sky Clifford APRN - CNP   40 mg at 06/11/22 0535    polyethylene glycol (GLYCOLAX) packet 17 g  17 g Oral Daily Sky Clifford, APRN - CNP   17 g at 06/10/22 0849    insulin lispro (HUMALOG) injection vial 0-12 Units  0-12 Units SubCUTAneous TID WC Sky Clifford APRN - CNP   2 Units at 06/11/22 0919    insulin lispro (HUMALOG) injection vial 0-6 Units  0-6 Units SubCUTAneous Nightly Sky Clifford, APRN - CNP   1 Units at 06/10/22 2033    FLUoxetine (PROZAC) capsule 40 mg  40 mg Oral Daily Shan Walker MD   40 mg at 06/11/22 0917    docusate sodium (COLACE) capsule 100 mg  100 mg Oral Nightly Vijay Drake Endeavor, DO   100 mg at 06/10/22 2032        Allergies: Allergies   Allergen Reactions    Latex      Itchy         Physical Examination  Vitals   Vitals:    06/10/22 2228 06/11/22 0815 06/11/22 0915 06/11/22 0917   BP: (!) 184/76 (!) 159/124 (!) 160/90    Pulse: 77 78  76   Resp: 18 18     Temp: 97.5 °F (36.4 °C) 98.2 °F (36.8 °C)     TempSrc: Temporal Oral     SpO2: 97% 95%     Weight:       Height:            General: Patient appears in no acute distress. Awake and oriented x 2. HEENT: Normocephalic, atraumatic  Chest: Clear to auscultation bilaterally  Heart: No murmurs appreciated  Extremities/Peripheral vascular: No edema/swelling noted. No cold limbs noted. Neurologic Examination    Mental Status  Alert. Speech is fluent with intact comprehension. Evidence memory impairment- Oriented to self, place, city, state. Not to year, month, current president. Unable to spell world backwards. Unable to do simple calculations. Delayed recall 1/3. Trouble following multi-step cross body commands. Did fairly good with abstract associations . Attention and concentration appeared fair. Cranial Nerves  II. Visual fields full to confrontation bilaterally.    III, IV, VI: Pupils equally round and reactive to light, 3 to 2 mm bilaterally. EOMs: full, no nystagmus. V. Facial sensation intact to light touch bilaterally  VII: Facial movements symmetric and strong  VIII: Hearing intact to voice  IX,X: Palate elevates symmetrically. No dysarthria  XI: Sternocleidomastoid and trapezius 5/5 bilaterally   XII: Tongue is midline    Motor     Right Left   Right Left   Deltoid 5 5  Hip Flexion 5 4   Biceps      5  5  Knee Extension 5 4   Triceps 5 5  Knee Flexion 5 4   Handgrip 5 4+  Ankle Dorsiflexion 5 4       Ankle Plantarflexion 5 4     Tone: Normal in all four limbs    Bulk: Normal in all four limbs with no evidence of atrophy    Pronator drift: absent bilaterally    Sensation  · Light Touch: Decreased LLE   · Pinprick: decreased LLE   · Vibration: moderately-markedly decreased ankles b/l     Reflexes    No babinski     + b/l grasp reflex present     Coordination  FFM and FNF symmetrical     Gait  Deferred for safety/fall consideration      Labs  Recent Labs     06/09/22  1350 06/09/22  1350 06/10/22  0453 06/10/22  0453 06/11/22  0430      < > 141   < > 137   K 3.4*   < > 3.3*  --  3.9   CL 98   < > 102   < > 103   CO2 30*   < > 26   < > 19*   BUN 23   < > 19   < > 20   CREATININE 1.4*   < > 1.1*   < > 1.3*   GLUCOSE 199*   < > 144*   < > 165*   CALCIUM 9.7   < > 9.5   < > 9.5   PROT 7.1  --   --   --   --    LABALBU 3.9  --   --   --   --    BILITOT 0.5  --   --   --   --    ALKPHOS 79  --   --   --   --    AST 24  --   --   --   --    ALT 15  --   --   --   --    WBC 13.0*  --   --   --   --    RBC 4.82  --   --   --   --    HGB 14.7  --   --   --   --    HCT 44.6  --   --   --   --    MCV 92.5  --   --   --   --    MCH 30.5  --   --   --   --    MCHC 33.0  --   --   --   --    RDW 12.8  --   --   --   --      --   --   --   --    MPV 9.6  --   --   --   --    LABA1C  --   --  6.3*  --   --     < > = values in this interval not displayed.      Imaging  CT HEAD WO CONTRAST Final Result   No acute intracranial abnormality. Cortical atrophy and periventricular leukomalacia. CT CERVICAL SPINE WO CONTRAST   Final Result   No acute abnormality of the cervical spine. Advanced multilevel degenerative changes and facet arthrosis. 1.2 cm calcified right thyroid nodule. Follow-up with non emergent thyroid   sonogram recommended. XR CHEST (2 VW)   Final Result   Minimal opacity at the left lung base, likely atelectasis. Suspect small left pleural effusion. XR KNEE LEFT (1-2 VIEWS)   Final Result   No acute bony abnormalities. 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 Suspect small joint effusion. XR PELVIS (1-2 VIEWS)   Final Result   No acute abnormality of the pelvis.          XR LUMBAR SPINE (MIN 4 VIEWS)    (Results Pending)   XR HIP BILATERAL W AP PELVIS (2 VIEWS)    (Results Pending)   MRI BRAIN WO CONTRAST    (Results Pending)         I have personally reviewed the following images: CT head     Electronically signed by RICARDO Crowley on 6/11/2022 at 10:42 AM

## 2022-06-12 ENCOUNTER — APPOINTMENT (OUTPATIENT)
Dept: MRI IMAGING | Age: 87
DRG: 057 | End: 2022-06-12
Payer: MEDICARE

## 2022-06-12 LAB
ANION GAP SERPL CALCULATED.3IONS-SCNC: 15 MMOL/L (ref 7–16)
BUN BLDV-MCNC: 22 MG/DL (ref 6–23)
CALCIUM SERPL-MCNC: 9.3 MG/DL (ref 8.6–10.2)
CHLORIDE BLD-SCNC: 103 MMOL/L (ref 98–107)
CO2: 22 MMOL/L (ref 22–29)
CREAT SERPL-MCNC: 1.4 MG/DL (ref 0.5–1)
GFR AFRICAN AMERICAN: 43
GFR NON-AFRICAN AMERICAN: 36 ML/MIN/1.73
GLUCOSE BLD-MCNC: 159 MG/DL (ref 74–99)
MAGNESIUM: 2 MG/DL (ref 1.6–2.6)
METER GLUCOSE: 141 MG/DL (ref 74–99)
METER GLUCOSE: 146 MG/DL (ref 74–99)
METER GLUCOSE: 151 MG/DL (ref 74–99)
METER GLUCOSE: 157 MG/DL (ref 74–99)
ORGANISM: ABNORMAL
POTASSIUM SERPL-SCNC: 4.1 MMOL/L (ref 3.5–5)
SODIUM BLD-SCNC: 140 MMOL/L (ref 132–146)
URINE CULTURE, ROUTINE: ABNORMAL

## 2022-06-12 PROCEDURE — 6370000000 HC RX 637 (ALT 250 FOR IP): Performed by: INTERNAL MEDICINE

## 2022-06-12 PROCEDURE — 6360000002 HC RX W HCPCS: Performed by: NURSE PRACTITIONER

## 2022-06-12 PROCEDURE — 72148 MRI LUMBAR SPINE W/O DYE: CPT

## 2022-06-12 PROCEDURE — 6370000000 HC RX 637 (ALT 250 FOR IP): Performed by: NURSE PRACTITIONER

## 2022-06-12 PROCEDURE — 70551 MRI BRAIN STEM W/O DYE: CPT

## 2022-06-12 PROCEDURE — 83735 ASSAY OF MAGNESIUM: CPT

## 2022-06-12 PROCEDURE — 82962 GLUCOSE BLOOD TEST: CPT

## 2022-06-12 PROCEDURE — 2580000003 HC RX 258: Performed by: NURSE PRACTITIONER

## 2022-06-12 PROCEDURE — 96376 TX/PRO/DX INJ SAME DRUG ADON: CPT

## 2022-06-12 PROCEDURE — 80048 BASIC METABOLIC PNL TOTAL CA: CPT

## 2022-06-12 PROCEDURE — 36415 COLL VENOUS BLD VENIPUNCTURE: CPT

## 2022-06-12 PROCEDURE — 96372 THER/PROPH/DIAG INJ SC/IM: CPT

## 2022-06-12 PROCEDURE — 1200000000 HC SEMI PRIVATE

## 2022-06-12 RX ADMIN — METOPROLOL SUCCINATE 25 MG: 25 TABLET, EXTENDED RELEASE ORAL at 09:57

## 2022-06-12 RX ADMIN — PRAVASTATIN SODIUM 40 MG: 20 TABLET ORAL at 09:57

## 2022-06-12 RX ADMIN — DOCUSATE SODIUM 100 MG: 100 CAPSULE, LIQUID FILLED ORAL at 21:50

## 2022-06-12 RX ADMIN — CLOPIDOGREL BISULFATE 75 MG: 75 TABLET ORAL at 09:57

## 2022-06-12 RX ADMIN — INSULIN LISPRO 2 UNITS: 100 INJECTION, SOLUTION INTRAVENOUS; SUBCUTANEOUS at 13:21

## 2022-06-12 RX ADMIN — INSULIN LISPRO 4 UNITS: 100 INJECTION, SOLUTION INTRAVENOUS; SUBCUTANEOUS at 09:59

## 2022-06-12 RX ADMIN — AMLODIPINE BESYLATE 2.5 MG: 2.5 TABLET ORAL at 09:57

## 2022-06-12 RX ADMIN — FLUOXETINE 40 MG: 20 CAPSULE ORAL at 09:58

## 2022-06-12 RX ADMIN — INSULIN LISPRO 1 UNITS: 100 INJECTION, SOLUTION INTRAVENOUS; SUBCUTANEOUS at 21:51

## 2022-06-12 RX ADMIN — LOSARTAN POTASSIUM 50 MG: 50 TABLET, FILM COATED ORAL at 09:58

## 2022-06-12 RX ADMIN — ENOXAPARIN SODIUM 40 MG: 100 INJECTION SUBCUTANEOUS at 09:58

## 2022-06-12 RX ADMIN — SODIUM CHLORIDE, PRESERVATIVE FREE 10 ML: 5 INJECTION INTRAVENOUS at 21:51

## 2022-06-12 RX ADMIN — OXYBUTYNIN CHLORIDE 5 MG: 5 TABLET ORAL at 09:58

## 2022-06-12 RX ADMIN — OXYBUTYNIN CHLORIDE 5 MG: 5 TABLET ORAL at 21:50

## 2022-06-12 RX ADMIN — PANTOPRAZOLE SODIUM 40 MG: 40 TABLET, DELAYED RELEASE ORAL at 06:09

## 2022-06-12 RX ADMIN — ASPIRIN 81 MG: 81 TABLET, COATED ORAL at 09:57

## 2022-06-12 RX ADMIN — SODIUM CHLORIDE, PRESERVATIVE FREE 10 ML: 5 INJECTION INTRAVENOUS at 09:58

## 2022-06-12 RX ADMIN — INSULIN LISPRO 2 UNITS: 100 INJECTION, SOLUTION INTRAVENOUS; SUBCUTANEOUS at 18:30

## 2022-06-12 RX ADMIN — ACETAMINOPHEN 650 MG: 325 TABLET ORAL at 21:50

## 2022-06-12 RX ADMIN — HYDRALAZINE HYDROCHLORIDE 10 MG: 20 INJECTION INTRAMUSCULAR; INTRAVENOUS at 09:59

## 2022-06-12 ASSESSMENT — PAIN DESCRIPTION - DESCRIPTORS: DESCRIPTORS: ACHING

## 2022-06-12 ASSESSMENT — PAIN DESCRIPTION - ORIENTATION: ORIENTATION: LEFT

## 2022-06-12 ASSESSMENT — PAIN SCALES - GENERAL: PAINLEVEL_OUTOF10: 0

## 2022-06-12 ASSESSMENT — PAIN DESCRIPTION - LOCATION: LOCATION: HEAD;LEG;SHOULDER

## 2022-06-12 NOTE — CONSULTS
Daily  polyethylene glycol (GLYCOLAX) packet 17 g, 17 g, Oral, Daily PRN  acetaminophen (TYLENOL) tablet 650 mg, 650 mg, Oral, Q6H PRN **OR** acetaminophen (TYLENOL) suppository 650 mg, 650 mg, Rectal, Q6H PRN  glucose chewable tablet 16 g, 4 tablet, Oral, PRN  dextrose bolus 10% 125 mL, 125 mL, IntraVENous, PRN **OR** dextrose bolus 10% 250 mL, 250 mL, IntraVENous, PRN  glucagon (rDNA) injection 1 mg, 1 mg, IntraMUSCular, PRN  dextrose 5 % solution, 100 mL/hr, IntraVENous, PRN  pantoprazole (PROTONIX) tablet 40 mg, 40 mg, Oral, QAM AC  polyethylene glycol (GLYCOLAX) packet 17 g, 17 g, Oral, Daily  insulin lispro (HUMALOG) injection vial 0-12 Units, 0-12 Units, SubCUTAneous, TID WC  insulin lispro (HUMALOG) injection vial 0-6 Units, 0-6 Units, SubCUTAneous, Nightly  FLUoxetine (PROZAC) capsule 40 mg, 40 mg, Oral, Daily  docusate sodium (COLACE) capsule 100 mg, 100 mg, Oral, Nightly  Allergies:  Latex    Social History:   TOBACCO:   reports that she quit smoking about 36 years ago. She has a 60.00 pack-year smoking history. She has never used smokeless tobacco.  ETOH:   reports current alcohol use. DRUGS:   reports no history of drug use. ACTIVITIES OF DAILY LIVING:    OCCUPATION:    Family History:   History reviewed. No pertinent family history.     REVIEW OF SYSTEMS:  CONSTITUTIONAL:  negative for fevers, chills  EYES:  negative for acute changes  HEENT:  negative for acute changes  RESPIRATORY:  negative for dyspnea  CARDIOVASCULAR:  negative for chest pain, palpitations  GASTROINTESTINAL:  negative for nausea, vomiting  GENITOURINARY:  negative for frequency  HEMATOLOGIC/LYMPHATIC:  negative for bleeding and petechiae  MUSCULOSKELETAL:  positive for left knee pain, chronic  NEUROLOGICAL:  negative for head trauma or LOC  BEHAVIOR/PSYCH:  negative for increased agitation and anxiety    PHYSICAL EXAM:    VITALS:  BP (!) 161/85   Pulse 78   Temp 98.7 °F (37.1 °C) (Oral)   Resp 20   Ht 5' 6\" (1.676 m)   Wt 184 lb 8 oz (83.7 kg)   SpO2 96%   BMI 29.78 kg/m²   CONSTITUTIONAL:  awake, alert, cooperative, no apparent distress, and appears stated age  EYES: Lids and lashes normal, extra ocular muscles intact, sclera clear, conjunctiva normal  ENT: Normocephalic, without obvious abnormality, atraumatic, external ears without lesions, oral pharynx with moist mucus membranes  NECK: Trachea midline, skin normal  LUNGS: No increased work of breathing, good air exchange  CARDIOVASCULAR: 2+ pulses throughout and capillary Refill less than 2 seconds  ABDOMEN: soft, non-distended, non-tender and no rebound tenderness or guarding  NEUROLOGIC: Awake, alert, and answering questions appropriately. Motor and sensory abnormalities noted below, otherwise motor is 5 out of 5 bilaterally and sensory is intact. MUSCULOSKELETAL:  Left lower Extremity:  No knee effusion appreciated  No TTP about the knee  No erythema about the knee  No ecchymosis about the knee  Compartments soft and compressible  Calf soft and non tender  +PF/DF/EHL  +2/4 DP & PT pulses, Brisk Cap refill, Toes warm and perfused  Distal sensation grossly intact to Peroneals, Sural, Saphenous, and tibial nrs    Secondary Exam:   Bilateral UE: No obvious signs of trauma. -TTP to fingers, hand, wrist, forearm, elbow, humerus, shoulder or clavicle. -- Patient able to flex/extend fingers, wrist, elbow and shoulder with active and passive ROM without pain, +2/4 Radial pulse, cap refill <3sec, +AIN/PIN/Radial/Ulnar/Median N, distal sensation grossly intact to C4-T1 dermatomes, compartments soft and compressible. Right LE: No obvious signs of trauma. -TTP to foot, ankle, leg, knee, thigh, hip.-- Patient able to flex/extend toes, ankle, knee and hip with active and passive ROM without pain,+2/4 DP & PT pulses, cap refill <3sec, +5/5 PF/DF/EHL, distal sensation grossly intact to L4-S1 dermatomes, compartments soft and compressible.     Pelvis: -TTP, -Log roll, -Heel strike DATA:    CBC:   Lab Results   Component Value Date    WBC 13.0 06/09/2022    RBC 4.82 06/09/2022    HGB 14.7 06/09/2022    HCT 44.6 06/09/2022    MCV 92.5 06/09/2022    MCH 30.5 06/09/2022    MCHC 33.0 06/09/2022    RDW 12.8 06/09/2022     06/09/2022    MPV 9.6 06/09/2022     PT/INR:    Lab Results   Component Value Date    PROTIME 11.2 07/29/2019    INR 1.0 07/29/2019       Radiology Review:  X-ray left knee: Tricompartmental osteoarthritic degenerative changes noted. No fractures or dislocations appreciated. X-ray of bilateral hips: No acute osseous abnormalities appreciated. IMPRESSION:  Left knee osteoarthritis    PLAN:  Weightbearing as tolerated left lower extremity  No acute orthopedic surgical intervention planned at this time. Patient's pain can be managed conservatively at this point. Commend outpatient follow-up for continued management  Recommend PT/OT  Multimodal pain control per admitting service  DVT prophylaxis per admitting service  Follow-up outpatient with Dr. Di Clark in 2-4 weeks, call for appointment  Plan was discussed with attending    All questions were sought and answered during encounter    Electronically signed by Delmar aDvis DO on 6/12/2022 at 6:51 AM    Orthopaedic Attending    I have seen and evaluated the patient with the resident and agree with the above assessments on today's visit. I have performed the key components of the history and physical examination and concur completely with the findings and plans as documented above. Patient confused. Complaining of left knee pain, states this has been chronic for at least a year now. Denies any recent injury or trauma although per history does have a series of falls. States her legs are weak and unstable. Evidence of advanced arthritis left knee clinically and radiographically. No obvious fracture. Recommend PT/OT. Patient can be weightbearing as tolerated. Conservative managements.   She can follow-up with us as an outpatient as needed.     Electronically signed by   Gloria Landon DO  6/12/2022

## 2022-06-12 NOTE — PROGRESS NOTES
06/09/22  1350   WBC 13.0*   HGB 14.7   HCT 44.6        Recent Labs     06/10/22  0453 06/11/22  0430 06/12/22  0458    137 140   K 3.3* 3.9 4.1    103 103   CO2 26 19* 22   BUN 19 20 22   CREATININE 1.1* 1.3* 1.4*   CALCIUM 9.5 9.5 9.3     Recent Labs     06/09/22  1350   AST 24   ALT 15   BILITOT 0.5   ALKPHOS 79     No results for input(s): INR in the last 72 hours. Recent Labs     06/09/22  1350   CKTOTAL 125     Recent Labs     06/09/22  1350   AST 24   ALT 15   BILITOT 0.5   ALKPHOS 79     No results for input(s): LACTA in the last 72 hours. Lab Results   Component Value Date    LABURIC 7.0 (H) 10/14/2019     No results found for: AMMONIA    Assessment:    Active Hospital Problems    Diagnosis Date Noted    Falls frequently [R29.6] 06/09/2022     Priority: Medium   HYPOKALEMIA   II DM   THYROID NODULE   NICK   CKD 3   CHANGE IN MENTAL STATUS   HLD  DIZZINESS  PLAN:  ORTHOSTATICS  SSI  PROZAC   NORVASC   NEURO        DVT Prophylaxis: *LOVENOX  Diet: ADULT DIET;  Regular; 4 carb choices (60 gm/meal)  Code Status: Full Code     PT/OT Eval Status:  ORDERED     Dispo - *EVIN     Electronically signed by Sussy Ho DO on 6/12/2022 at 12:25 PM [facility]

## 2022-06-13 LAB
ANION GAP SERPL CALCULATED.3IONS-SCNC: 12 MMOL/L (ref 7–16)
BUN BLDV-MCNC: 24 MG/DL (ref 6–23)
CALCIUM SERPL-MCNC: 9.2 MG/DL (ref 8.6–10.2)
CHLORIDE BLD-SCNC: 103 MMOL/L (ref 98–107)
CO2: 24 MMOL/L (ref 22–29)
CREAT SERPL-MCNC: 1.4 MG/DL (ref 0.5–1)
GFR AFRICAN AMERICAN: 43
GFR NON-AFRICAN AMERICAN: 36 ML/MIN/1.73
GLUCOSE BLD-MCNC: 156 MG/DL (ref 74–99)
MAGNESIUM: 2 MG/DL (ref 1.6–2.6)
METER GLUCOSE: 130 MG/DL (ref 74–99)
METER GLUCOSE: 157 MG/DL (ref 74–99)
METER GLUCOSE: 157 MG/DL (ref 74–99)
METER GLUCOSE: 234 MG/DL (ref 74–99)
POTASSIUM SERPL-SCNC: 4.2 MMOL/L (ref 3.5–5)
SODIUM BLD-SCNC: 139 MMOL/L (ref 132–146)

## 2022-06-13 PROCEDURE — 97530 THERAPEUTIC ACTIVITIES: CPT

## 2022-06-13 PROCEDURE — 6370000000 HC RX 637 (ALT 250 FOR IP): Performed by: NURSE PRACTITIONER

## 2022-06-13 PROCEDURE — 1200000000 HC SEMI PRIVATE

## 2022-06-13 PROCEDURE — 97535 SELF CARE MNGMENT TRAINING: CPT

## 2022-06-13 PROCEDURE — 6360000002 HC RX W HCPCS: Performed by: NURSE PRACTITIONER

## 2022-06-13 PROCEDURE — 96372 THER/PROPH/DIAG INJ SC/IM: CPT

## 2022-06-13 PROCEDURE — 6370000000 HC RX 637 (ALT 250 FOR IP): Performed by: INTERNAL MEDICINE

## 2022-06-13 PROCEDURE — 80048 BASIC METABOLIC PNL TOTAL CA: CPT

## 2022-06-13 PROCEDURE — 82962 GLUCOSE BLOOD TEST: CPT

## 2022-06-13 PROCEDURE — 96376 TX/PRO/DX INJ SAME DRUG ADON: CPT

## 2022-06-13 PROCEDURE — 36415 COLL VENOUS BLD VENIPUNCTURE: CPT

## 2022-06-13 PROCEDURE — 83735 ASSAY OF MAGNESIUM: CPT

## 2022-06-13 PROCEDURE — 2580000003 HC RX 258: Performed by: NURSE PRACTITIONER

## 2022-06-13 RX ORDER — DONEPEZIL HYDROCHLORIDE 5 MG/1
5 TABLET, FILM COATED ORAL NIGHTLY
Status: DISCONTINUED | OUTPATIENT
Start: 2022-06-13 | End: 2022-06-14 | Stop reason: HOSPADM

## 2022-06-13 RX ORDER — AMLODIPINE BESYLATE 5 MG/1
5 TABLET ORAL DAILY
Status: DISCONTINUED | OUTPATIENT
Start: 2022-06-14 | End: 2022-06-14 | Stop reason: HOSPADM

## 2022-06-13 RX ORDER — AMLODIPINE BESYLATE 2.5 MG/1
2.5 TABLET ORAL ONCE
Status: DISCONTINUED | OUTPATIENT
Start: 2022-06-13 | End: 2022-06-14 | Stop reason: HOSPADM

## 2022-06-13 RX ADMIN — CLOPIDOGREL BISULFATE 75 MG: 75 TABLET ORAL at 08:18

## 2022-06-13 RX ADMIN — OXYBUTYNIN CHLORIDE 5 MG: 5 TABLET ORAL at 21:10

## 2022-06-13 RX ADMIN — LOSARTAN POTASSIUM 50 MG: 50 TABLET, FILM COATED ORAL at 08:18

## 2022-06-13 RX ADMIN — AMLODIPINE BESYLATE 2.5 MG: 2.5 TABLET ORAL at 08:18

## 2022-06-13 RX ADMIN — ASPIRIN 81 MG: 81 TABLET, COATED ORAL at 08:18

## 2022-06-13 RX ADMIN — HYDRALAZINE HYDROCHLORIDE 10 MG: 20 INJECTION INTRAMUSCULAR; INTRAVENOUS at 21:09

## 2022-06-13 RX ADMIN — PANTOPRAZOLE SODIUM 40 MG: 40 TABLET, DELAYED RELEASE ORAL at 06:05

## 2022-06-13 RX ADMIN — FLUOXETINE 40 MG: 20 CAPSULE ORAL at 08:18

## 2022-06-13 RX ADMIN — DOCUSATE SODIUM 100 MG: 100 CAPSULE, LIQUID FILLED ORAL at 21:10

## 2022-06-13 RX ADMIN — INSULIN LISPRO 2 UNITS: 100 INJECTION, SOLUTION INTRAVENOUS; SUBCUTANEOUS at 08:19

## 2022-06-13 RX ADMIN — METOPROLOL SUCCINATE 25 MG: 25 TABLET, EXTENDED RELEASE ORAL at 08:18

## 2022-06-13 RX ADMIN — PRAVASTATIN SODIUM 40 MG: 20 TABLET ORAL at 08:18

## 2022-06-13 RX ADMIN — ENOXAPARIN SODIUM 40 MG: 100 INJECTION SUBCUTANEOUS at 08:19

## 2022-06-13 RX ADMIN — INSULIN LISPRO 1 UNITS: 100 INJECTION, SOLUTION INTRAVENOUS; SUBCUTANEOUS at 21:13

## 2022-06-13 RX ADMIN — SODIUM CHLORIDE, PRESERVATIVE FREE 10 ML: 5 INJECTION INTRAVENOUS at 21:10

## 2022-06-13 RX ADMIN — DONEPEZIL HYDROCHLORIDE 5 MG: 5 TABLET, FILM COATED ORAL at 21:10

## 2022-06-13 RX ADMIN — OXYBUTYNIN CHLORIDE 5 MG: 5 TABLET ORAL at 08:18

## 2022-06-13 RX ADMIN — INSULIN LISPRO 4 UNITS: 100 INJECTION, SOLUTION INTRAVENOUS; SUBCUTANEOUS at 12:09

## 2022-06-13 NOTE — DISCHARGE INSTR - COC
Continuity of Care Form    Patient Name: Jose Yepez   :  1935  MRN:  [id number]    Admit date:  2022  Discharge date:  ***    Code Status Order: Full Code   Advance Directives:      Admitting Physician:  Diego Waller DO  PCP: Gena Muñoz MD    Discharging Nurse: [facility] Unit/Room#: 1527/3525-V  Discharging Unit Phone Number: ***    Emergency Contact:   Extended Emergency Contact Information  Primary Emergency Contact: Renee Claude  Address: [address] Phone: [phone]  Relation: Spouse    Past Surgical History:  Past Surgical History:   Procedure Laterality Date     SECTION      CHOLECYSTECTOMY      COLONOSCOPY      EYE SURGERY      bilateral w/ implants    HYSTERECTOMY (CERVIX STATUS UNKNOWN)         Immunization History:   Immunization History   Administered Date(s) Administered    COVID-19, Pfizer Purple top, DILUTE for use, 12+ yrs, 30mcg/0.3mL dose 2021, 02/15/2021, 10/19/2021       Active Problems:  Patient Active Problem List   Diagnosis Code    Diabetes mellitus type 2, uncontrolled ([facility] [address] E11.65    Essential hypertension, benign I10    Hyperlipidemia with target LDL less than 100 E78.5    Syncope and collapse R55    Stenosis of intracranial portions of left internal carotid artery I65.22    Stenosis of left middle cerebral artery I66.02    Sprain or strain of cervical spine [id number]    Cervical radiculopathy at C5 M54.12    Falls frequently R29.6       Isolation/Infection:   Isolation            No Isolation          Patient Infection Status       None to display            Nurse Assessment:  Last Vital Signs: BP (!) 156/90   Pulse 72   Temp 98.4 °F (36.9 °C) (Oral)   Resp 16   Ht 5' 6\" (1.676 m)   Wt 184 lb 8 oz (83.7 kg)   SpO2 97%   BMI 29.78 kg/m²     Last documented pain score (0-10 scale): Pain Level: 0  Last Weight:   Wt Readings from Last 1 Encounters:   06/10/22 184 lb 8 oz (83.7 kg)     Mental Status:  Patient forgetful to time and situation    IV Access:  - None    Nursing Mobility/ADLs:  Walking   Dependent  Transfer  Assisted  Bathing  Assisted  Dressing  Assisted  Toileting  Assisted  Feeding  [address]  Assisted  Med Delivery   whole    Wound Care Documentation and Therapy:        Elimination:  Continence: Bowel: No  Bladder: No  Urinary Catheter: None   Colostomy/Ileostomy/Ileal Conduit: NO       Date of Last BM: 6/11/2022    Intake/Output Summary (Last 24 hours) at 6/13/2022 1000  Last data filed at 6/13/2022 0607  Gross per 24 hour   Intake 120 ml   Output 500 ml   Net -380 ml     I/O last 3 completed shifts: In: 120 [P.O.:120]  Out: 500 [Urine:500]    Safety Concerns: At Risk for Falls    Impairments/Disabilities:      Vision and Hearing    Nutrition Therapy:  Current Nutrition Therapy:   - Oral Diet:  Carb controlled diet 60 gram/meal    Routes of Feeding: Oral  Liquids: {Slp liquid thickness:33306}  Daily Fluid Restriction: no  Last Modified Barium Swallow with Video (Video Swallowing Test): not done    Treatments at the Time of Hospital Discharge:   Respiratory Treatments: ***  Oxygen Therapy:  is not on home oxygen therapy.   Ventilator:    - No ventilator support    Rehab Therapies: PT and OT to eval and treat   Weight Bearing Status/Restrictions: No weight bearing restrictions  Other Medical Equipment (for information only, NOT a DME order):  {EQUIPMENT:833960609}  Other Treatments: ***    Patient's personal belongings (please select all that are sent with patient):  Glasses, Jewelry,1 ring, dentures uppers and lowers    RN SIGNATURE:  {Esignature:[id number]}    CASE MANAGEMENT/SOCIAL WORK SECTION    Inpatient Status Date: ***    Readmission Risk Assessment Score:  Readmission Risk              Risk of Unplanned Readmission:  16           Discharging to Facility/ Agency   Name: [facility]   Address:  Phone:  Fax:    Dialysis Facility (if applicable)   Name:  Address:  Dialysis Schedule:  Phone:  Fax:    / signature: Electronically signed by JERMAINE Horan on 6/13/2022 at 10:00 AM      PHYSICIAN SECTION    Prognosis: {Prognosis:3632335652}    Condition at Discharge: Saige Granda Patient Condition:[id number]}    Rehab Potential (if transferring to Rehab): {Prognosis:1710568431}    Recommended Labs or Other Treatments After Discharge: ***    Physician Certification: I certify the above information and transfer of Varun Fermin  is necessary for the continuing treatment of the diagnosis listed and that she requires [place] for less 30 days.      Update Admission H&P: {CHP DME Changes in YVZFJ:[id number]}    PHYSICIAN SIGNATURE:  Electronically signed by Russ Yepez DO on 6/14/22 at 9:56 AM EDT

## 2022-06-13 NOTE — PROGRESS NOTES
Occupational Therapy  OT BEDSIDE TREATMENT NOTE   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    Reason for Admission: frequent falls with dizziness and issues with L knee and increased confusion,  having difficulty caring for pt at home     Recommended Adaptive Equipment: TBD pending progression       Precautions:  Fall Risk, Confusion, incontinent       Assessment of current deficits:     [x]? Functional mobility            [x]?ADLs           [x]? Strength                  [x]? Cognition    [x]? Functional transfers          [x]? IADLs         [x]? Safety Awareness   [x]? Endurance    [x]? Fine Coordination                         [x]? Balance      []? Vision/perception   []? Sensation      []? Gross Motor Coordination             []? ROM           []?  Delirium                   []? Motor Control      OT PLAN OF CARE   OT POC based on physician orders, patient diagnosis and results of clinical assessment     Frequency/Duration: 1-3 days/wk for 2 weeks PRN   Specific OT Treatment Interventions to include:   * Instruction/training on adapted ADL techniques and AE recommendations to increase functional independence within precautions       * Training on energy conservation strategies, correct breathing pattern and techniques to improve independence/tolerance for self-care routine  * Functional transfer/mobility training/DME recommendations for increased independence, safety, and fall prevention  * Patient/Family education to increase follow through with safety techniques and functional independence  * Recommendation of environmental modifications for increased safety with functional transfers/mobility and ADLs  * Therapeutic exercise to improve motor endurance, ROM, and functional strength for ADLs/functional transfers     Home Living: Pt questionable historian/recall of home set up. Son present and providing correct inforation   Pt lives with  (who is home 24/7 and retired) in 2 story home with 2 steps to [facility]. 1st floor set up.  1/2 bath on main floor  Bathroom setup: pt does not access downstairs or upstairs where showers are located  (tub only upstairs and shower downstairs)  Equipment owned: ww, cane     Prior Level of Function: ADLs - pt requires minimal assist from  with all dressing/bathing tasks   IADLs - dependent on  with groceries, cooking, and laundry (pt son present and reports him and his brother live within 10-15min from pt and pt  able to provide assist as needed)  ambulated with ww from bed to recliner or recliner to bathroom  Driving: no - pt  and children drive     Pain Level: L knee pain, educated on positioning  Cognition: A&O: 4/4 - able to self correct when wrong  Follows single step directions with increased verbal cues d/t easily distracted and poor short term memory recall              Memory:  fair              Sequencing:  fair              Problem solving:  fair              Judgement/safety:  fair-                Functional Assessment:  AM-PAC Daily Activity Raw Score: 17/24    Initial Eval Status  Date: 6/10/22 Treatment Status  Date: 6/13/22 STGs = LTGs  Time frame: 10-14 days   Feeding Minimal Assist   Tray set up and proper positioning Set up  Supervision    Grooming Minimal Assist   Wash face seated EOB  Verbal cues to complete task/attention to task  Min A  To comb hair seated EOB Supervision    UB Dressing Minimal Assist   Doff/destin gown seated EOB with assist d/t poor seated dynamic balance  Min A  To don/doff gown seated EOB Supervision    LB Dressing Minimal Assist   Destin/doff socks seated EOB   Educated on figure 4 technique    Decreased dynamic sitting balance requiring verbal cues for correction of balance  Min A  To don/doff socks and don brief seated EOB and standing to pull over hips  Supervision    Bathing Moderate Assist  poor dyamic standing balance would benefit from shower chair   Per pt son completing sponge baths with assist from  for 1+ years Mod A  Simulated seated EOB and standing for posterior  Supervision    Toileting Dependent   Pt unsafe to ambulate to bathroom at this time d/t weakness and decreased insight   RN aware pt incontinent of bowels during session   Mod A- hygiene  Mod A- clothing management  Pt incontinent of urine when therapists arived Minimal Assist    Bed Mobility  Log Roll: NT  Supine to sit: Minimal Assist assist with trunk and educate don use of bed rail  Sit to supine: Minimal Assist   Assist with BLE   SBA- supine>sit  Educated pt on technique to increase independence. Cuing for safety Supine to sit: Independent   Sit to supine: Independent    Functional Transfers Sit to stand: Mod A with ww   Stand to sit: Mod A with ww   3 sit to stand transfers EOB with standing ~30sec - 1 minute (pt requesting to return to sitting)  Stand pivot: NT  Commode: NT  Min A- sit<->stand  Cuing for hand placement and for safety. Abruptly sits. Sit to stand: SBA with ww   Stand to sit:SBA with ww  Stand pivot: SBA with ww  Commode: SBA with ww    Functional Mobility NT   Pt unable to tolerate standing > 1 minute, unable to progress BLE toward HOB Mod A- stand pivot transfer using w/w  Moderate Assist with ww   Balance Sitting:     Static - SBA     Dynamic - SBA  Standing:  Mod A with ww Sitting:     Static - Ind     Dynamic - SBA  Standing: Min A Sitting:  Static: Supervision  Dynamic: Supervision  Standing: SBA with ww   Activity Tolerance Fair -   Limited d/t overall weakness/fatigue with all functional mobility   Required seated rest break after each attempt to stand Fair      Visual/  Perceptual Glasses: yes     visual tracking - easily distracted                             BUE  ROM/Strength/  Fine motor Coordination Hand dominance: Right      RUE: ROM WFL     Strength: 4/5      Strength: FAIR     Coordination:  FAIR     LUE: ROM WFL     Strength: 4-/5      Strength: FAIR -     Coordination:  FAIR -   increase BUE  muscle strength for improved indep with functional transfers       Comments: Upon arrival pt supine in bed. Pt educated on techniques to increase independence and safety during ADL's, bed mobility, and functional transfers. At end of session pt left seated in bedside chair, call light within reach. · Pt has made fair progress towards set goals.      · Continue with current plan of care    Treatment Time In: 9:25            Treatment Time Out: 9:49             Treatment Charges: Mins Units   Ther Ex  [id number]     Manual Therapy [id number]     Thera Activities 07257 10 1   ADL/Home Mgt 09442 14 1   Neuro Re-ed 40387     Group Therapy      Orthotic manage/training  01268     Non-Billable Time     Total Timed Treatment 24 2     Sydnie Barroso

## 2022-06-13 NOTE — PROGRESS NOTES
Physical Therapy    Treatment Note    Name: Mick Mcclain  : 1935  MRN: [id number]      Date of Service: 2022    Evaluating PT: Wendy Oliveros, PT, DPT [id number]      Room #:  5012/1860-  Diagnosis:  Falls frequently [R29.6]  Altered mental status, unspecified altered mental status type [R41.82]  PMHx/PSHx:  HTN, Diarrhea, DM, HLD, Stenosis of Intracranial Portions or Left Internal Carotid Artery, Stenosis of Left Middle Cerebral Artery, Stroke  Precautions:  Fall risk, [place], Impulsive, Cognition, Alarm     SUBJECTIVE:    Pt is a questionable historian. Pt reports living with  in a 2 story house with 2 stair(s) and 0 rail(s) to enter. Bed and bath on 1st floor. Pt ambulated with [employer] prior to admission. OBJECTIVE:   Initial Evaluation  Date: 6/10/22 Treatment Date: 22 Short Term/ Long Term   Goals   AM-PAC 6 Clicks 68/57 55/87    Was pt agreeable to Eval/treatment? Yes Yes    Does pt have pain? Pt reported no pain at baseline; 10/10 L knee pain during activity HA pain    Bed Mobility  Rolling: NT  Supine to sit: SBA  Sit to supine: SBA  Scooting: SBA Rolling: NT  Supine to sit: SBA  Sit to supine: SBA  Scooting: SBA Rolling: Independent  Supine to sit: Independent  Sit to supine: Independent  Scooting: Independent   Transfers Sit to stand: Mod A  Stand to sit: Min A  Stand pivot: NT Sit to stand: Min A  Stand to sit: Mod A  Stand pivot: Mod A with [employer] Sit to stand: Supervision  Stand to sit: Supervision  Stand pivot: Supervision   Ambulation   NT 1 foot with WW with Mod A >50 feet with SBA with [employer]   Stair negotiation: ascended and descended NT NT >4 step(s) with 2 rail(s) with Min A   ROM BUE: WFL  BLE: WFL   L Knee extension limited NT    Strength BUE: See OT note   BLE: RLE Knee extension 2/5 limited by pain.  Hip flexion 4/5  LLE: Grossly 5/5   Testing limited by pt cognition and limited attenition NT    Balance Sitting EOB: SBA  Dynamic Standing: Min A with [employer] Sitting EOB: SBA  Dynamic Standing: Min A with [employer] Sitting EOB: Independent  Dynamic Standing: Supervision with [employer]     Pt is A & O x: 4 to person, place, month/year, and situation. Sensation: NT  Edema: NT    Patient education  Pt educated on PT role in acute care setting. Patient response to education:   Pt verbalized understanding Pt demonstrated skill Pt requires further education in this area   Yes NA No     ASSESSMENT:    Comments:    Pt was in bed upon room entry; agreeable to PT treatment. Pt was oriented and able to follow commands. Pt moves fast and impulsively at times. Pt completed supine to sit transfer to EOB without need for hands on assist. Pt had 1 LOB during dynamic sitting balance (ADLs). Pt leaned on L elbow to correct. Pt completed several transfers from EOB using incorrect hand placement. Pt was able to stand pivot to chair but prematurely sat and required assistance to maneuver hips/buttocks to chair. Pt states \"when I need to sit, I sit\". Pt verbalized understanding to call for assistance when she wishes to return to bed. Pt was left in chair with all needs met at conclusion of session. RN notified. Treatment:  Patient practiced and was instructed in the following treatment:     Therapeutic activities:  o Bed mobility: Pt was cued for technique during supine to sit transfer. o Transfers: Pt was cued for hand placement during sit <> stand transfers. Pt completed x2 transfers from EOB. o Ambulation: Pt ambulated short distance to chair. Pt was cued for [employer] technique and safety. PLAN:    Patient is making slow progress towards established goals. Will continue with current POC.       Time in: 0920  Time out: 0945    Total Treatment Time 25 minutes     CPT codes:  [] Gait training 58763 0 minutes  [] Manual therapy 90091 0 minutes  [x] Therapeutic activities 81201 25 minutes  [] Therapeutic exercises 13660 0 minutes  [] Neuromuscular reeducation 50842 0 minutes      Yolis John, PT, DPT   [id number]

## 2022-06-13 NOTE — PROGRESS NOTES
Physician Progress Note      Remi Duncan  [facility] #:                  [id number]  :                       1935  ADMIT DATE:       2022 1:29 PM  [address] [place] DATE:  RESPONDING  PROVIDER #:        Flaquita NELSON DO          QUERY TEXT:    Dear Dr. Elly Tran,    Pt admitted with frequent falls. Pt noted to have AMS. If possible, please   document in the progress notes and discharge summary if you are evaluating and   / or treating any of the following: The medical record reflects the following:  Risk Factors: Advancing age  Clinical Indicators: Frequent falls,  reports increase in altered   mental status, Elevated blood pressure readings during stay thus far, Notable   abnormalities in Blood chemistries, PMH CVA (L) ICA stenosis (L) MCA stenosis  Treatment: Serial labs, Serial images, Close pt monitoring, Neurology consult    Thank you,  Arpita GARCIA, RN  Clinical Documentation Improvement  [email or web address]  Cell Phone: [phone]  Options provided:  -- Hypertensive encephalopathy  -- Metabolic encephalopathy  -- Delirium due to ##(Please provide cause), (Please specify cause). -- Delirium  -- Other - I will add my own diagnosis  -- Disagree - Not applicable / Not valid  -- Disagree - Clinically unable to determine / Unknown  -- Refer to Clinical Documentation Reviewer    PROVIDER RESPONSE TEXT:    Provider disagreed with this query.   she probably has dementia from previous strokes    Query created by: Adan Stevens on 2022 3:17 PM      Electronically signed by:  Flaquita NELSON DO 2022 6:48 PM

## 2022-06-13 NOTE — CARE COORDINATION
SOCIAL WORK/CASEMANAGEMENT TRANSITION OF CARE Sveta Paredes, [address]): met with pt and spouse in the room. The plan is to [facility] if qualifies. Called for updated PT and OT this a.m. the spouse wants #1 jeni hickey, referral made rep to assess and they  Have beds, #2 sov nora and #3 Princella Le is needed and will be started once accepted and therapy notes are in today. JERMAINE Schwartz  6/13/2022  Pt accepted to [facility], precert to be started today. JERMAINE Schwartz  6/13/2022  EXEMPT  Started and envelope in soft chart JERMAINE Schwartz  6/13/2022

## 2022-06-14 VITALS
TEMPERATURE: 98.6 F | HEART RATE: 98 BPM | HEIGHT: 66 IN | WEIGHT: 184.5 LBS | OXYGEN SATURATION: 96 % | BODY MASS INDEX: 29.65 KG/M2 | SYSTOLIC BLOOD PRESSURE: 143 MMHG | DIASTOLIC BLOOD PRESSURE: 58 MMHG | RESPIRATION RATE: 18 BRPM

## 2022-06-14 LAB
ANION GAP SERPL CALCULATED.3IONS-SCNC: 14 MMOL/L (ref 7–16)
BUN BLDV-MCNC: 25 MG/DL (ref 6–23)
CALCIUM SERPL-MCNC: 9.6 MG/DL (ref 8.6–10.2)
CHLORIDE BLD-SCNC: 102 MMOL/L (ref 98–107)
CO2: 19 MMOL/L (ref 22–29)
CREAT SERPL-MCNC: 1.4 MG/DL (ref 0.5–1)
GFR AFRICAN AMERICAN: 43
GFR NON-AFRICAN AMERICAN: 36 ML/MIN/1.73
GLUCOSE BLD-MCNC: 180 MG/DL (ref 74–99)
MAGNESIUM: 2.1 MG/DL (ref 1.6–2.6)
METER GLUCOSE: 158 MG/DL (ref 74–99)
METER GLUCOSE: 226 MG/DL (ref 74–99)
METER GLUCOSE: 228 MG/DL (ref 74–99)
POTASSIUM SERPL-SCNC: 4.6 MMOL/L (ref 3.5–5)
SARS-COV-2, NAAT: NOT DETECTED
SODIUM BLD-SCNC: 135 MMOL/L (ref 132–146)

## 2022-06-14 PROCEDURE — 6370000000 HC RX 637 (ALT 250 FOR IP): Performed by: INTERNAL MEDICINE

## 2022-06-14 PROCEDURE — 87635 SARS-COV-2 COVID-19 AMP PRB: CPT

## 2022-06-14 PROCEDURE — 83735 ASSAY OF MAGNESIUM: CPT

## 2022-06-14 PROCEDURE — 6370000000 HC RX 637 (ALT 250 FOR IP): Performed by: NURSE PRACTITIONER

## 2022-06-14 PROCEDURE — 2580000003 HC RX 258: Performed by: NURSE PRACTITIONER

## 2022-06-14 PROCEDURE — 6360000002 HC RX W HCPCS: Performed by: NURSE PRACTITIONER

## 2022-06-14 PROCEDURE — 82962 GLUCOSE BLOOD TEST: CPT

## 2022-06-14 PROCEDURE — 96372 THER/PROPH/DIAG INJ SC/IM: CPT

## 2022-06-14 PROCEDURE — 80048 BASIC METABOLIC PNL TOTAL CA: CPT

## 2022-06-14 PROCEDURE — 36415 COLL VENOUS BLD VENIPUNCTURE: CPT

## 2022-06-14 RX ORDER — INSULIN LISPRO 100 [IU]/ML
0-12 INJECTION, SOLUTION INTRAVENOUS; SUBCUTANEOUS
Qty: 1 EACH | Refills: 0 | Status: SHIPPED | OUTPATIENT
Start: 2022-06-14

## 2022-06-14 RX ORDER — DOCUSATE SODIUM 100 MG/1
100 CAPSULE, LIQUID FILLED ORAL NIGHTLY
DISCHARGE
Start: 2022-06-14

## 2022-06-14 RX ORDER — DONEPEZIL HYDROCHLORIDE 5 MG/1
5 TABLET, FILM COATED ORAL NIGHTLY
Qty: 30 TABLET | Refills: 3 | DISCHARGE
Start: 2022-06-14

## 2022-06-14 RX ORDER — INSULIN LISPRO 100 [IU]/ML
0-6 INJECTION, SOLUTION INTRAVENOUS; SUBCUTANEOUS NIGHTLY
DISCHARGE
Start: 2022-06-14

## 2022-06-14 RX ORDER — AMLODIPINE BESYLATE 5 MG/1
5 TABLET ORAL DAILY
Qty: 30 TABLET | Refills: 3 | DISCHARGE
Start: 2022-06-15

## 2022-06-14 RX ADMIN — CLOPIDOGREL BISULFATE 75 MG: 75 TABLET ORAL at 09:27

## 2022-06-14 RX ADMIN — FLUOXETINE 40 MG: 20 CAPSULE ORAL at 09:27

## 2022-06-14 RX ADMIN — ENOXAPARIN SODIUM 40 MG: 100 INJECTION SUBCUTANEOUS at 09:28

## 2022-06-14 RX ADMIN — SODIUM CHLORIDE, PRESERVATIVE FREE 10 ML: 5 INJECTION INTRAVENOUS at 09:28

## 2022-06-14 RX ADMIN — OXYBUTYNIN CHLORIDE 5 MG: 5 TABLET ORAL at 09:26

## 2022-06-14 RX ADMIN — PRAVASTATIN SODIUM 40 MG: 20 TABLET ORAL at 09:26

## 2022-06-14 RX ADMIN — METOPROLOL SUCCINATE 25 MG: 25 TABLET, EXTENDED RELEASE ORAL at 09:27

## 2022-06-14 RX ADMIN — ASPIRIN 81 MG: 81 TABLET, COATED ORAL at 09:27

## 2022-06-14 RX ADMIN — AMLODIPINE BESYLATE 5 MG: 5 TABLET ORAL at 09:27

## 2022-06-14 RX ADMIN — INSULIN LISPRO 2 UNITS: 100 INJECTION, SOLUTION INTRAVENOUS; SUBCUTANEOUS at 09:34

## 2022-06-14 RX ADMIN — INSULIN LISPRO 4 UNITS: 100 INJECTION, SOLUTION INTRAVENOUS; SUBCUTANEOUS at 12:54

## 2022-06-14 RX ADMIN — LOSARTAN POTASSIUM 50 MG: 50 TABLET, FILM COATED ORAL at 09:26

## 2022-06-14 RX ADMIN — PANTOPRAZOLE SODIUM 40 MG: 40 TABLET, DELAYED RELEASE ORAL at 05:31

## 2022-06-14 NOTE — PLAN OF CARE
Problem: Skin/Tissue Integrity  Goal: Absence of new skin breakdown  Outcome: Progressing     Problem: Safety - Adult  Goal: Free from fall injury  Outcome: Progressing     Problem: ABCDS Injury Assessment  Goal: Absence of physical injury  Outcome: Progressing  Flowsheets (Taken 6/13/2022 4501)  Absence of Physical Injury: Implement safety measures based on patient assessment     Problem: Pain  Goal: Verbalizes/displays adequate comfort level or baseline comfort level  Outcome: Progressing     Problem: Chronic Conditions and Co-morbidities  Goal: Patient's chronic conditions and co-morbidity symptoms are monitored and maintained or improved  Outcome: Progressing  Flowsheets (Taken 6/13/2022 0171)  Care Plan - Patient's Chronic Conditions and Co-Morbidity Symptoms are Monitored and Maintained or Improved: Monitor and assess patient's chronic conditions and comorbid symptoms for stability, deterioration, or improvement

## 2022-06-14 NOTE — PROGRESS NOTES
Hospitalist Progress Note      PCP: Lara Hankins MD    Date of Admission: 6/9/2022        Hospital Course:  **81 y. o. female presented with  FREQUENT FALLS, HISTORY GIVEN BY .  HE STATES THAT SHE WILL JUST BE WALKING AND HER LEGS WILL GIVE OUT AND SHE WILL FALL,  SHE HAS HIT HER HEAD SEVERAL TIMES, NO LOC, SHE DOES NOT LOSE CONSCIOUSNESS.  HE SAID HE HAS ASKED HIS DOCTOR FOR HELP WITH HER AT HOME AND HE HAS NOT BEEN SUCCESSFUL.  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, BACK SHOWS MOD TO SEVERE SPINAL CANAL STENOSIS * TO [facility] TODAY        Subjective: *NO COMPLAINTS          Medications:  Reviewed    Infusion Medications    sodium chloride      dextrose       Scheduled Medications    amLODIPine  5 mg Oral Daily    amLODIPine  2.5 mg Oral Once    donepezil  5 mg Oral Nightly    aspirin  81 mg Oral Daily    clopidogrel  75 mg Oral Daily    [Held by provider] hydroCHLOROthiazide  12.5 mg Oral Once per day on Mon Thu    losartan  50 mg Oral Daily    metoprolol succinate  25 mg Oral Daily    oxybutynin  5 mg Oral BID    pravastatin  40 mg Oral Daily    sodium chloride flush  5-40 mL IntraVENous 2 times per day    enoxaparin  40 mg SubCUTAneous Daily    pantoprazole  40 mg Oral QAM AC    polyethylene glycol  17 g Oral Daily    insulin lispro  0-12 Units SubCUTAneous TID WC    insulin lispro  0-6 Units SubCUTAneous Nightly    FLUoxetine  40 mg Oral Daily    docusate sodium  100 mg Oral Nightly     PRN Meds: hydrALAZINE, sodium chloride flush, sodium chloride, polyethylene glycol, acetaminophen **OR** acetaminophen, glucose, dextrose bolus **OR** dextrose bolus, glucagon (rDNA), dextrose      Intake/Output Summary (Last 24 hours) at 6/14/2022 1501  Last data filed at 6/13/2022 1510  Gross per 24 hour   Intake 420 ml   Output --   Net 420 ml       Exam:    BP (!) 143/58   Pulse 98   Temp 98.6 °F (37 °C) (Oral)   Resp 18   Ht 5' 6\" (1.676 m)   Wt 184 lb 8 oz (83.7 kg)   SpO2 96%   BMI 29.78 kg/m²       No apparent distress, appears stated age and cooperative. HEENT:  Normal cephalic, atraumatic without obvious deformity. Neck: Supple, with full range of motion. No jugular venous distention. TRespiratory:  Normal respiratory effort. Clear to auscultation,   Cardiovascular:  Regular rate and rhythm   Abdomen: Soft, non-tender, non-distended with normal bowel sounds. Musculoskeletal:  No clubbing, cyanosis or edema bilaterally.    Skin: Skin color, texture, turgor normal.  No rashes or lesions. Neurologic:  Neurovascularly intact   Psychiatric:  Alert  TO PERSON AND PLACE NOT TIME                   Labs:   No results for input(s): WBC, HGB, HCT, PLT in the last 72 hours. Recent Labs     06/12/22  0458 06/13/22  0427 06/14/22  0604    139 135   K 4.1 4.2 4.6    103 102   CO2 22 24 19*   BUN 22 24* 25*   CREATININE 1.4* 1.4* 1.4*   CALCIUM 9.3 9.2 9.6     No results for input(s): AST, ALT, BILIDIR, BILITOT, ALKPHOS in the last 72 hours. No results for input(s): INR in the last 72 hours. No results for input(s): [place] Fe in the last 72 hours. No results for input(s): AST, ALT, ALB, BILIDIR, BILITOT, ALKPHOS in the last 72 hours. No results for input(s): LACTA in the last 72 hours.   Lab Results   Component Value Date    LABURIC 7.0 (H) 10/14/2019     No results found for: AMMONIA    Assessment:    Active Hospital Problems    Diagnosis Date Noted    Falls frequently [R29.6] 06/09/2022     Priority: Medium   HYPOKALEMIA   II DM  [address] [facility] [address]  [address] [place] [address]    Plan:  DC  TO [facility]    Electronically signed by Haven Tam DO on 6/14/2022 at 3:01 PM [facility]

## 2022-06-14 NOTE — PLAN OF CARE
Problem: Skin/Tissue Integrity  Goal: Absence of new skin breakdown  Description: 1. Monitor for areas of redness and/or skin breakdown  2. Assess vascular access sites hourly  3. Every 4-6 hours minimum:  Change oxygen saturation probe site  4. Every 4-6 hours:  If on nasal continuous positive airway pressure, respiratory therapy assess nares and determine need for appliance change or resting period.   6/14/2022 1325 by Colton Wallace RN  Outcome: Completed     Problem: Safety - Adult  Goal: Free from fall injury  6/14/2022 1325 by Colton Wallace RN  Outcome: Completed     Problem: ABCDS Injury Assessment  Goal: Absence of physical injury  6/14/2022 1325 by Colton Wallace RN  Outcome: Completed     Problem: Pain  Goal: Verbalizes/displays adequate comfort level or baseline comfort level  6/14/2022 1325 by Colton Wallace RN  Outcome: Completed     Problem: Chronic Conditions and Co-morbidities  Goal: Patient's chronic conditions and co-morbidity symptoms are monitored and maintained or improved  6/14/2022 1325 by Colton Wallace RN  Outcome: Completed     Problem: Chronic Conditions and Co-morbidities  Goal: Patient's chronic conditions and co-morbidity symptoms are monitored and maintained or improved  6/14/2022 1325 by Colton Wallace RN  Outcome: Completed

## 2022-06-14 NOTE — PROGRESS NOTES
1357-Attempted to call report to [facility] [phone] nurse at [facility] will retry. 1434-Report called to nurse Sonny Sarah at [facility] discharge instructions reviewed with nurse Sonny Sarah.

## 2022-06-14 NOTE — CARE COORDINATION
SOCIAL WORK/CASEMANAGEMENT TRANSITION OF CARE PLANNINGTrevin Paredes, [address]): discharge to [facility] today. rn to run [employer]. tammy ambulette  at 2:30 p.m. exempt completed. Left vm for spouse regarding the above.snf/loc. precert obtained.  [facility]  6/14/2022

## 2022-06-14 NOTE — PLAN OF CARE
Problem: Skin/Tissue Integrity  Goal: Absence of new skin breakdown  Description: 1. Monitor for areas of redness and/or skin breakdown  2. Assess vascular access sites hourly  3. Every 4-6 hours minimum:  Change oxygen saturation probe site  4. Every 4-6 hours:  If on nasal continuous positive airway pressure, respiratory therapy assess nares and determine need for appliance change or resting period.   6/14/2022 1108 by Keara Casanova RN  Outcome: Progressing     Problem: Safety - Adult  Goal: Free from fall injury  6/14/2022 1108 by Keara Casanova RN  Outcome: Progressing     Problem: ABCDS Injury Assessment  Goal: Absence of physical injury  6/14/2022 1108 by Keara Casanova RN  Outcome: Progressing     Problem: Pain  Goal: Verbalizes/displays adequate comfort level or baseline comfort level  6/14/2022 1108 by Keara Casanova RN  Outcome: Progressing

## 2022-06-15 LAB
ALBUMIN SERPL-MCNC: 3.2 G/DL (ref 3.5–5.2)
ALP BLD-CCNC: 62 U/L (ref 35–104)
ALT SERPL-CCNC: 14 U/L (ref 0–32)
ANION GAP SERPL CALCULATED.3IONS-SCNC: 9 MMOL/L (ref 7–16)
AST SERPL-CCNC: 26 U/L (ref 0–31)
BASOPHILS ABSOLUTE: 0.07 E9/L (ref 0–0.2)
BASOPHILS RELATIVE PERCENT: 0.6 % (ref 0–2)
BILIRUB SERPL-MCNC: 0.3 MG/DL (ref 0–1.2)
BUN BLDV-MCNC: 31 MG/DL (ref 6–23)
CALCIUM SERPL-MCNC: 9.5 MG/DL (ref 8.6–10.2)
CHLORIDE BLD-SCNC: 103 MMOL/L (ref 98–107)
CHOLESTEROL, TOTAL: 111 MG/DL (ref 0–199)
CO2: 25 MMOL/L (ref 22–29)
CREAT SERPL-MCNC: 1.5 MG/DL (ref 0.5–1)
EOSINOPHILS ABSOLUTE: 0.51 E9/L (ref 0.05–0.5)
EOSINOPHILS RELATIVE PERCENT: 4.5 % (ref 0–6)
GFR AFRICAN AMERICAN: 40
GFR NON-AFRICAN AMERICAN: 33 ML/MIN/1.73
GLUCOSE BLD-MCNC: 153 MG/DL (ref 74–99)
HBA1C MFR BLD: 6.6 % (ref 4–5.6)
HCT VFR BLD CALC: 41.7 % (ref 34–48)
HDLC SERPL-MCNC: 35 MG/DL
HEMOGLOBIN: 13.5 G/DL (ref 11.5–15.5)
IMMATURE GRANULOCYTES #: 0.09 E9/L
IMMATURE GRANULOCYTES %: 0.8 % (ref 0–5)
LDL CHOLESTEROL CALCULATED: 55 MG/DL (ref 0–99)
LYMPHOCYTES ABSOLUTE: 1.47 E9/L (ref 1.5–4)
LYMPHOCYTES RELATIVE PERCENT: 12.9 % (ref 20–42)
MCH RBC QN AUTO: 30.8 PG (ref 26–35)
MCHC RBC AUTO-ENTMCNC: 32.4 % (ref 32–34.5)
MCV RBC AUTO: 95 FL (ref 80–99.9)
MONOCYTES ABSOLUTE: 1 E9/L (ref 0.1–0.95)
MONOCYTES RELATIVE PERCENT: 8.8 % (ref 2–12)
NEUTROPHILS ABSOLUTE: 8.26 E9/L (ref 1.8–7.3)
NEUTROPHILS RELATIVE PERCENT: 72.4 % (ref 43–80)
PDW BLD-RTO: 13.2 FL (ref 11.5–15)
PLATELET # BLD: 345 E9/L (ref 130–450)
PMV BLD AUTO: 9.8 FL (ref 7–12)
POTASSIUM SERPL-SCNC: 4.3 MMOL/L (ref 3.5–5)
RBC # BLD: 4.39 E12/L (ref 3.5–5.5)
SODIUM BLD-SCNC: 137 MMOL/L (ref 132–146)
TOTAL PROTEIN: 6.2 G/DL (ref 6.4–8.3)
TRIGL SERPL-MCNC: 106 MG/DL (ref 0–149)
TSH SERPL DL<=0.05 MIU/L-ACNC: 2.63 UIU/ML (ref 0.27–4.2)
VLDLC SERPL CALC-MCNC: 21 MG/DL
WBC # BLD: 11.4 E9/L (ref 4.5–11.5)

## 2022-06-15 NOTE — DISCHARGE SUMMARY
Hospitalist Discharge Summary    Patient ID: Huong Sorensen   Patient : 1935  Patient's PCP: Fannie Dodd MD    Admit Date: 2022   Admitting Physician: Shiv Muse DO    Discharge Date:  6/15/2022   Discharge Physician: Shiv Muse DO   Discharge Condition: Stable  Discharge Disposition: Skilled Facility      Discharge Diagnoses: Active Hospital Problems    Diagnosis Date Noted    Falls frequently [R29.6] 2022     Priority: Medium   HYPOKALEMIA   II DM   THYROID NODULE   NICK   CKD 3   CHANGE IN [address] course in brief:  81 y. o. female presented with  FREQUENT FALLS, HISTORY GIVEN BY .  HE STATES THAT SHE WILL JUST BE WALKING AND HER LEGS WILL GIVE OUT AND SHE WILL FALL,  SHE HAS HIT HER HEAD SEVERAL TIMES, NO LOC, SHE DOES NOT LOSE CONSCIOUSNESS. HE SAID HE HAS ASKED HIS DOCTOR FOR HELP WITH HER AT HOME AND HE HAS NOT BEEN SUCCESSFUL.  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 HEENT:  Normal cephalic, atraumatic without obvious deformity. Neck: Supple, with full range of motion. No jugular venous distention. TRespiratory:  Normal respiratory effort. Clear to auscultation,   Cardiovascular:  Regular rate and rhythm   Abdomen: Soft, non-tender, non-distended with normal bowel sounds. Musculoskeletal:  No clubbing, cyanosis or edema bilaterally.    Skin: Skin color, texture, turgor normal.  No rashes or lesions. Neurologic:  Neurovascularly intact   Psychiatric:  Alert  TO PERSON AND PLACE NOT TIME           Prior to Admission medications    Medication Sig Start Date End Date Taking?  Authorizing Provider   insulin lispro (HUMALOG) 100 UNIT/ML SOLN injection vial Inject 0-12 Units into the skin 3 times daily (with meals) 6/14/22  Yes Russ Yepez, DO   insulin lispro (HUMALOG) 100 UNIT/ML SOLN injection vial Inject 0-6 Units into the skin nightly 6/14/22  Yes Wilfredo Dinora Adan, DO   amLODIPine (NORVASC) 5 MG tablet Take 1 tablet by mouth daily 6/15/22  Yes Wilfredo Dinora Adan, DO   docusate sodium (COLACE) 100 MG capsule Take 1 capsule by mouth nightly 6/14/22  Yes Russ Yepez, DO   donepezil (ARICEPT) 5 MG tablet Take 1 tablet by mouth nightly 6/14/22  Yes Russ Yepez, DO   clopidogrel (PLAVIX) 75 MG tablet Take 1 tablet by mouth daily. 4/3/15   Norma Doran, DO   losartan (COZAAR) 50 MG tablet Take 1 tablet by mouth daily. 3/31/15   Norma Doran, DO   metoprolol (TOPROL-XL) 25 MG XL tablet Take 1 tablet by mouth daily. 3/31/15   Norma Doran DO   FLUoxetine (PROZAC) 20 MG capsule Take 20 mg by mouth daily. Historical Provider, MD   aspirin 81 MG EC tablet Take 81 mg by mouth daily. Historical Provider, MD   oxybutynin (DITROPAN) 5 MG tablet Take 5 mg by mouth 2 times daily. Yovanny Chowdhury MD   pravastatin (PRAVACHOL) 40 MG tablet Take 40 mg by mouth daily. Historical Provider, MD       Consults:   IP CONSULT TO HOSPITALIST  TEST MOCK CON71  IP CONSULT TO NEUROLOGY  IP CONSULT TO ORTHOPEDIC SURGERY            Discharge Instructions / Follow up:    No future appointments. Continued appropriate risk factor modification of blood pressure, diabetes and serum lipids will remain essential to reducing risk of future atherosclerotic development    Activity: activity as tolerated    Significant labs:  CBC:   No results for input(s): WBC, RBC, HGB, HCT, MCV, RDW, PLT in the last 72 hours.   BMP:   Recent Labs     06/13/22  0427 06/14/22  0604    135   K 4.2 4.6    102   CO2 24 19*   BUN 24* 25*   CREATININE 1.4* 1.4*   MG 2.0 2.1     LFT:  No results for input(s): PROT, ALB, ALKPHOS, ALT, AST, BILITOT, AMYLASE, LIPASE in the last 72 hours. PT/INR: No results for input(s): INR, APTT in the last 72 hours. BNP: No results for input(s): BNP in the last 72 hours. Hgb A1C:   Lab Results   Component Value Date    LABA1C 6.3 (H) 06/10/2022     Folate and B12:   Lab Results   Component Value Date    LLWCUFWY12 9877 (H) 06/11/2022   ,   Lab Results   Component Value Date    FOLATE >20.0 06/11/2022     Thyroid Studies:   Lab Results   Component Value Date    TSH 2.370 06/11/2022       Urinalysis:    Lab Results   Component Value Date    NITRU Negative 06/09/2022    WBCUA NONE 06/09/2022    BACTERIA NONE SEEN 06/09/2022    RBCUA NONE 06/09/2022    BLOODU Negative 06/09/2022    SPECGRAV 1.010 06/09/2022    GLUCOSEU Negative 06/09/2022       Imaging:  XR CHEST (2 VW)    Result Date: 6/9/2022  EXAMINATION: TWO XRAY VIEWS OF THE CHEST 6/9/2022 3:38 pm COMPARISON: None. HISTORY: ORDERING SYSTEM PROVIDED HISTORY: fall TECHNOLOGIST PROVIDED HISTORY: Reason for exam:->fall What reading provider will be dictating this exam?->CRC FINDINGS: Patient is in suboptimal inspiration resulting in crowding of vascular markings. Minimal opacity at the left lung base. The right lung is clear. There is no pneumothorax. Minimal blunting of the left costophrenic angle. Minimal opacity at the left lung base, likely atelectasis. Suspect small left pleural effusion. XR LUMBAR SPINE (MIN 4 VIEWS)    Result Date: 6/12/2022  EXAMINATION: 5 XRAY VIEWS OF THE LUMBAR SPINE 6/11/2022 12:18 pm COMPARISON: None. HISTORY: ORDERING SYSTEM PROVIDED HISTORY: leg weakness, falling, lumbar djd TECHNOLOGIST PROVIDED HISTORY: Reason for exam:->leg weakness, falling, lumbar djd What reading provider will be dictating this exam?->CRC FINDINGS: Moderate left convexity scoliosis of the upper lumbar spine measuring 17 degrees from T12-L3. Bone density appears appropriate for age. There are 5 lumbar type vertebral bodies. Degenerative disc disease is noted at multiple levels most prominent at T12-L1, L2-3 and L4-5. Moderate facet arthropathy at L2, L 3-4 through L5-S1. bowel gas pattern appears unremarkable. Sacrum demonstrates normal alignment. Erosive changes are noted in the SI joints, left greater than right. 1.  Moderate left convexity upper lumbar scoliosis. 2.  Degenerative disc disease and facet arthropathy as described above. 3. No acute fracture or subluxation. XR PELVIS (1-2 VIEWS)    Result Date: 6/9/2022  EXAMINATION: ONE XRAY VIEW OF THE PELVIS 6/9/2022 3:38 pm COMPARISON: None. HISTORY: ORDERING SYSTEM PROVIDED HISTORY: fall TECHNOLOGIST PROVIDED HISTORY: Reason for exam:->fall What reading provider will be dictating this exam?->CRC FINDINGS: No evidence of pelvic fracture. Bilateral hips demonstrate normal alignment. No focal osseous lesion. SI joints are symmetric. There are degenerative changes in the visualized lower lumbar spine. No acute abnormality of the pelvis. XR HIP BILATERAL W AP PELVIS (2 VIEWS)    Result Date: 6/12/2022  EXAMINATION: ONE XRAY VIEW OF THE PELVIS AND TWO XRAY VIEWS OF EACH OF THE BILATERAL HIPS 6/11/2022 12:18 pm COMPARISON: None. HISTORY: ORDERING SYSTEM PROVIDED HISTORY: falls TECHNOLOGIST PROVIDED HISTORY: Reason for exam:->falls What reading provider will be dictating this exam?->CRC FINDINGS: Hips appear intact. No acute fracture or subluxation. Bone mineral density is appropriate for age. Sacrum appears normal with normal sacral foramina. There are degenerative changes in the lower lumbar spine and left SI joint. 1.  Degenerative changes in the lower lumbar spine and left SI joint. 2.  No acute fracture or subluxation involving the hips. Hip joint spaces are relatively well maintained. XR KNEE LEFT (1-2 VIEWS)    Result Date: 6/9/2022  EXAMINATION: TWO XRAY VIEWS OF THE LEFT KNEE 6/9/2022 3:38 pm COMPARISON: None.  HISTORY: ORDERING SYSTEM PROVIDED HISTORY: fall TECHNOLOGIST PROVIDED HISTORY: Reason for exam:->fall What reading provider will be dictating this exam?->CRC FINDINGS: There are no acute fractures or dislocations. 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 There is mild suprapatellar soft tissue swelling. No acute bony abnormalities. 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 Suspect small joint effusion. XR KNEE LEFT (3 VIEWS)    Result Date: 6/11/2022  EXAMINATION: THREE XRAY VIEWS OF THE LEFT KNEE 6/11/2022 2:59 pm COMPARISON: 06/09/2022 HISTORY: ORDERING SYSTEM PROVIDED HISTORY: pain TECHNOLOGIST PROVIDED HISTORY: Falling injury Reason for exam:->pain What reading provider will be dictating this exam?->CRC FINDINGS: No significant change from prior. No fracture or dislocation. Joint space narrowing with 3 compartment osteoarthritis and prominent marginal osteophytosis of the tibiofemoral joint posteriorly. Suprapatellar effusion estimated to be moderately large. Atherosclerotic calcifications are present. No significant change. Left knee could 3 compartment osteoarthritis with moderate effusion. No fracture seen. CT HEAD WO CONTRAST    Result Date: 6/9/2022  EXAMINATION: CT OF THE HEAD WITHOUT CONTRAST  6/9/2022 5:05 pm TECHNIQUE: CT of the head was performed without the administration of intravenous contrast. Automated exposure control, iterative reconstruction, and/or weight based adjustment of the mA/kV was utilized to reduce the radiation dose to as low as reasonably achievable. COMPARISON: July 29, 2019 HISTORY: ORDERING SYSTEM PROVIDED HISTORY: fall TECHNOLOGIST PROVIDED HISTORY: Has a \"code stroke\" or \"stroke alert\" been called? ->No Reason for exam:->fall Decision Support Exception - unselect if not a suspected or confirmed emergency medical condition->Emergency Medical Condition (MA) What reading provider will be dictating this exam?->CRC FINDINGS: BRAIN/VENTRICLES: There are age related cortical atrophy and periventricular white matter ischemic changes. 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 There is no acute intracranial hemorrhage, mass effect or midline shift. No abnormal extra-axial fluid collection. The gray-white differentiation is maintained without evidence of an acute infarct. There is no evidence of hydrocephalus. ORBITS: The visualized portion of the orbits demonstrate no acute abnormality. SINUSES: The visualized paranasal sinuses and mastoid air cells demonstrate no acute abnormality. SOFT TISSUES/SKULL:  No acute abnormality of the visualized skull or soft tissues. No acute intracranial abnormality. Cortical atrophy and periventricular leukomalacia. CT CERVICAL SPINE WO CONTRAST    Result Date: 6/9/2022  EXAMINATION: CT OF THE CERVICAL SPINE WITHOUT CONTRAST 6/9/2022 5:05 pm TECHNIQUE: CT of the cervical spine was performed without the administration of intravenous contrast. Multiplanar reformatted images are provided for review. Automated exposure control, iterative reconstruction, and/or weight based adjustment of the mA/kV was utilized to reduce the radiation dose to as low as reasonably achievable. COMPARISON: July 29, 2019 HISTORY: ORDERING SYSTEM PROVIDED HISTORY: fall TECHNOLOGIST PROVIDED HISTORY: Reason for exam:->fall Decision Support Exception - unselect if not a suspected or confirmed emergency medical condition->Emergency Medical Condition (MA) What reading provider will be dictating this exam?->CRC FINDINGS: BONES/ALIGNMENT: There is no acute fracture or traumatic malalignment. DEGENERATIVE CHANGES: There are advanced multilevel degenerative changes and facet arthrosis. Partial fusion of C4 and C5 vertebral bodies. SOFT TISSUES: There is no prevertebral soft tissue swelling. There is a 1.2 cm partially calcified right thyroid nodule. No acute abnormality of the cervical spine.  Advanced multilevel degenerative changes and facet arthrosis. 1.2 cm calcified right thyroid nodule. Follow-up with non emergent thyroid sonogram recommended. MRI LUMBAR SPINE WO CONTRAST    Result Date: 6/12/2022  EXAMINATION: MRI OF THE LUMBAR SPINE WITHOUT CONTRAST, 6/12/2022 9:18 am TECHNIQUE: Multiplanar multisequence MRI of the lumbar spine was performed without the administration of intravenous contrast. COMPARISON: Plain films of the lumbar spine dated 06/11/2022 HISTORY: ORDERING SYSTEM PROVIDED HISTORY: frequent falls, LS radiculoapthy TECHNOLOGIST PROVIDED HISTORY: Reason for exam:->frequent falls, LS radiculoapthy What is the sedation requirement?->None What reading provider will be dictating this exam?->CRC FINDINGS: BONES/ALIGNMENT: There is normal alignment of the spine. The vertebral body heights are maintained. The bone marrow signal appears unremarkable. There is moderate levoscoliosis. Moderate disc space narrowing is seen at L2-3 with subchondral signal change, consistent with degenerative disc disease. Mild narrowing seen at L1-L2 and L4-L5. There is disc desiccation in all the lumbar discs. SPINAL CORD: The conus terminates normally. SOFT TISSUES: No paraspinal mass identified. L1-L2: There is no significant disc herniation, spinal canal stenosis or neural foraminal narrowing. L2-L3: There is no significant disc herniation, spinal canal stenosis or neural foraminal narrowing. There is mild disc bulge and mild to moderate bilateral posterior facet degenerative change. L3-L4: There is disc bulge and moderate bilateral posterior facet degenerative change with ligamentum flavum hypertrophy. Increased facet joint fluid is seen on the right that can be associated with instability. There is a small left posterolateral and lateral disc protrusion causing moderate left subarticular recess stenosis and mild left neural foraminal narrowing. No significant right foraminal narrowing seen.  L4-L5: There is disc bulge with severe left and moderate right posterior facet degenerative change with asymmetric ligamentum flavum hypertrophy, greater on the left. No significant central canal stenosis is seen. There is a small left lateral disc protrusion causing moderate to severe left neural foraminal narrowing. No significant right foraminal narrowing seen. L5-S1: There is no significant disc herniation, spinal canal stenosis or neural foraminal narrowing. There is severe left and moderate right posterior facet degenerative change. 1. Degenerative change with multiple disc bulges. Moderate levoscoliosis. 2. No evidence of significant central canal stenosis. 3. Left lateral disc protrusion at L4-5 causing moderate to severe left L4-5 neural foraminal stenosis. 4. Left-sided disc protrusion at L3-4 causing moderate left subarticular recess stenosis and mild left neural foraminal stenosis. MRI BRAIN WO CONTRAST    Result Date: 6/12/2022  EXAMINATION: MRI OF THE BRAIN WITHOUT CONTRAST  6/12/2022 8:47 am TECHNIQUE: Multiplanar multisequence MRI of the brain was performed without the administration of intravenous contrast. COMPARISON: Head CT dated 06/09/2022 and MRI brain dated 03/27/2015 HISTORY: ORDERING SYSTEM PROVIDED HISTORY: frequent falls and confusion TECHNOLOGIST PROVIDED HISTORY: Reason for exam:->frequent falls and confusion What reading provider will be dictating this exam?->CRC FINDINGS: INTRACRANIAL STRUCTURES/VENTRICLES: There is no acute infarct. No mass effect or midline shift. No evidence of an acute intracranial hemorrhage. Small old right basal ganglia and thalamic infarcts are again noted, similar compared to 2015. There is moderate atrophy and extensive bilateral periventricular and subcortical T2 hyperintensity is seen, consistent with chronic microvascular ischemic disease. Otherwise, the ventricles and sulci are normal in size and configuration. The sellar/suprasellar regions appear unremarkable.   The normal signal voids within the major intracranial vessels appear maintained. ORBITS: The visualized portion of the orbits demonstrate no acute abnormality. SINUSES: The visualized paranasal sinuses and mastoid air cells demonstrate no acute abnormality. There is mild mucosal thickening in the ethmoid and maxillary sinuses, greater on the left with small retention cyst in the inferior portion of the left maxillary sinus. BONES/SOFT TISSUES: The bone marrow signal intensity appears normal. The soft tissues demonstrate no acute abnormality. 1. No evidence of acute intracranial abnormality. 2. Moderate atrophy and chronic white matter ischemic changes. Small old right basal ganglia and thalamic infarcts. Discharge Medications:      Medication List      START taking these medications    docusate sodium 100 MG capsule  Commonly known as: COLACE  Take 1 capsule by mouth nightly     donepezil 5 MG tablet  Commonly known as: ARICEPT  Take 1 tablet by mouth nightly     * insulin lispro 100 UNIT/ML Soln injection vial  Commonly known as: HUMALOG  Inject 0-12 Units into the skin 3 times daily (with meals)     * insulin lispro 100 UNIT/ML Soln injection vial  Commonly known as: HUMALOG  Inject 0-6 Units into the skin nightly         * This list has 2 medication(s) that are the same as other medications prescribed for you. Read the directions carefully, and ask your doctor or other care provider to review them with you. CHANGE how you take these medications    amLODIPine 5 MG tablet  Commonly known as: NORVASC  Take 1 tablet by mouth daily  What changed:   · medication strength  · how much to take        CONTINUE taking these medications    aspirin 81 MG EC tablet     clopidogrel 75 MG tablet  Commonly known as: PLAVIX  Take 1 tablet by mouth daily. FLUoxetine 20 MG capsule  Commonly known as: PROZAC     losartan 50 MG tablet  Commonly known as: COZAAR  Take 1 tablet by mouth daily.      metoprolol succinate 25 MG extended release tablet  Commonly known as: TOPROL XL  Take 1 tablet by mouth daily. oxybutynin 5 MG tablet  Commonly known as: DITROPAN     pravastatin 40 MG tablet  Commonly known as: PRAVACHOL        STOP taking these medications    Cod Liver Oil 1000 MG Caps     colestipol 1 g tablet  Commonly known as: COLESTID     cyanocobalamin 50 MCG tablet     famotidine 20 MG tablet  Commonly known as: Pepcid     folic acid 1 MG tablet  Commonly known as: FOLVITE     glimepiride 2 MG tablet  Commonly known as: AMARYL     hydroCHLOROthiazide 12.5 MG tablet  Commonly known as: HYDRODIURIL     hydrocortisone 1 % ointment     loperamide 2 MG capsule  Commonly known as: IMODIUM     metFORMIN 1000 MG tablet  Commonly known as: GLUCOPHAGE     therapeutic multivitamin-minerals tablet           Where to Get Your Medications      These medications were sent to [address] [place] [address] [facility] [phone] Orlean Figures [phone]  [facility], [address] [place] [address]    Phone: [phone]   · insulin lispro 100 UNIT/ML Soln injection vial     Information about where to get these medications is not yet available    Ask your nurse or doctor about these medications  · amLODIPine 5 MG tablet  · docusate sodium 100 MG capsule  · donepezil 5 MG tablet  · insulin lispro 100 UNIT/ML Soln injection vial         Time Spent on discharge is more than 45 minutes in the examination, evaluation, counseling and review of medications and discharge plan.    +++++++++++++++++++++++++++++++++++++++++++++++++  Vijay Escobar, DO  [address], [place]  +++++++++++++++++++++++++++++++++++++++++++++++++  NOTE: This report was transcribed using voice recognition software. Every effort was made to ensure accuracy; however, inadvertent computerized transcription errors may be present.

## 2022-06-27 ENCOUNTER — TELEPHONE (OUTPATIENT)
Dept: ADMINISTRATIVE | Age: 87
End: 2022-06-27

## 2022-06-27 NOTE — TELEPHONE ENCOUNTER
Nora Muniz from [facility] called to schedule a Hospital Follow up [address] for pt. Falls frequently. 06/09-06/14 with Grayson Celeste. They would like her seen in [address] [place] [address]. Joshua Zarate number [phone].

## 2022-06-29 LAB
ANION GAP SERPL CALCULATED.3IONS-SCNC: 12 MMOL/L (ref 7–16)
BASOPHILS ABSOLUTE: 0.05 E9/L (ref 0–0.2)
BASOPHILS RELATIVE PERCENT: 0.6 % (ref 0–2)
BUN BLDV-MCNC: 25 MG/DL (ref 6–23)
CALCIUM SERPL-MCNC: 9.5 MG/DL (ref 8.6–10.2)
CHLORIDE BLD-SCNC: 106 MMOL/L (ref 98–107)
CO2: 22 MMOL/L (ref 22–29)
CREAT SERPL-MCNC: 1.2 MG/DL (ref 0.5–1)
EOSINOPHILS ABSOLUTE: 0.6 E9/L (ref 0.05–0.5)
EOSINOPHILS RELATIVE PERCENT: 7.6 % (ref 0–6)
GFR AFRICAN AMERICAN: 52
GFR NON-AFRICAN AMERICAN: 43 ML/MIN/1.73
GLUCOSE BLD-MCNC: 102 MG/DL (ref 74–99)
HCT VFR BLD CALC: 40.6 % (ref 34–48)
HEMOGLOBIN: 13.2 G/DL (ref 11.5–15.5)
IMMATURE GRANULOCYTES #: 0.03 E9/L
IMMATURE GRANULOCYTES %: 0.4 % (ref 0–5)
LYMPHOCYTES ABSOLUTE: 1.57 E9/L (ref 1.5–4)
LYMPHOCYTES RELATIVE PERCENT: 19.9 % (ref 20–42)
MCH RBC QN AUTO: 30.5 PG (ref 26–35)
MCHC RBC AUTO-ENTMCNC: 32.5 % (ref 32–34.5)
MCV RBC AUTO: 93.8 FL (ref 80–99.9)
MONOCYTES ABSOLUTE: 0.77 E9/L (ref 0.1–0.95)
MONOCYTES RELATIVE PERCENT: 9.7 % (ref 2–12)
NEUTROPHILS ABSOLUTE: 4.88 E9/L (ref 1.8–7.3)
NEUTROPHILS RELATIVE PERCENT: 61.8 % (ref 43–80)
PDW BLD-RTO: 13.3 FL (ref 11.5–15)
PLATELET # BLD: 300 E9/L (ref 130–450)
PMV BLD AUTO: 10.1 FL (ref 7–12)
POTASSIUM SERPL-SCNC: 3.7 MMOL/L (ref 3.5–5)
RBC # BLD: 4.33 E12/L (ref 3.5–5.5)
SODIUM BLD-SCNC: 140 MMOL/L (ref 132–146)
WBC # BLD: 7.9 E9/L (ref 4.5–11.5)

## 2022-08-09 ENCOUNTER — HOSPITAL ENCOUNTER (OUTPATIENT)
Dept: CT IMAGING | Age: 87
Discharge: HOME | End: 2022-08-11
Payer: MEDICARE

## 2022-08-09 DIAGNOSIS — R74.02 NONSPECIFIC ELEVATION OF LEVELS OF TRANSAMINASE OR LACTIC ACID DEHYDROGENASE (LDH): ICD-10-CM

## 2022-08-09 DIAGNOSIS — R74.01 NONSPECIFIC ELEVATION OF LEVELS OF TRANSAMINASE OR LACTIC ACID DEHYDROGENASE (LDH): ICD-10-CM

## 2022-08-09 PROCEDURE — 74176 CT ABD & PELVIS W/O CONTRAST: CPT

## 2023-06-23 NOTE — PROGRESS NOTES
Hospitalist Progress Note      PCP: Franklyn Canela MD    Date of Admission: 6/9/2022        Hospital Course:  *81 y. o. female presented with  FREQUENT FALLS, HISTORY GIVEN BY .  HE STATES THAT SHE WILL JUST BE WALKING AND HER LEGS WILL GIVE OUT AND SHE WILL FALL,  SHE HAS HIT HER HEAD SEVERAL TIMES, NO LOC, SHE DOES NOT LOSE CONSCIOUSNESS.  HE SAID HE HAS ASKED HIS DOCTOR FOR HELP WITH HER AT HOME AND HE HAS NOT BEEN SUCCESSFUL.  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, BACK SHOWS MOD TO SEVERE SPINAL CANAL STENOSIS         Subjective: **PLEASANTLY CONFUSED          Medications:  Reviewed    Infusion Medications    sodium chloride      dextrose       Scheduled Medications    [START ON 6/14/2022] amLODIPine  5 mg Oral Daily    amLODIPine  2.5 mg Oral Once    donepezil  5 mg Oral Nightly    aspirin  81 mg Oral Daily    clopidogrel  75 mg Oral Daily    [Held by provider] hydroCHLOROthiazide  12.5 mg Oral Once per day on Mon Thu    losartan  50 mg Oral Daily    metoprolol succinate  25 mg Oral Daily    oxybutynin  5 mg Oral BID    pravastatin  40 mg Oral Daily    sodium chloride flush  5-40 mL IntraVENous 2 times per day    enoxaparin  40 mg SubCUTAneous Daily    pantoprazole  40 mg Oral QAM AC    polyethylene glycol  17 g Oral Daily    insulin lispro  0-12 Units SubCUTAneous TID WC    insulin lispro  0-6 Units SubCUTAneous Nightly    FLUoxetine  40 mg Oral Daily    docusate sodium  100 mg Oral Nightly     PRN Meds: hydrALAZINE, sodium chloride flush, sodium chloride, polyethylene glycol, acetaminophen **OR** acetaminophen, glucose, dextrose bolus **OR** dextrose bolus, glucagon (rDNA), dextrose      Intake/Output Summary (Last 24 hours) at 6/13/2022 1423  Last data filed at 6/13/2022 1250  Gross per 24 hour   Intake 100 ml   Output 500 ml   Net -400 ml       Exam:    BP (!) 156/90   Pulse 72   Temp 98.4 °F (36.9 °C) (Oral)   Resp 16   Ht 5' 6\" (1.676 m)   Wt 184 lb 8 oz (83.7 kg)   SpO2 97%   BMI 29.78 kg/m²       No apparent distress, appears stated age and cooperative. HEENT:  Normal cephalic, atraumatic without obvious deformity. Neck: Supple, with full range of motion. No jugular venous distention. Trachea midline. Respiratory:  Normal respiratory effort. Clear to auscultation,   Cardiovascular:  Regular rate and rhythm   Abdomen: Soft, non-tender, non-distended with normal bowel sounds. Musculoskeletal:  No clubbing, cyanosis or edema bilaterally. Skin: Skin color, texture, turgor normal.  No rashes or lesions. Neurologic:  Neurovascularly intact   Psychiatric:  Alert  TO PERSON AND PLACE NOT TIME              Labs:   No results for input(s): WBC, HGB, HCT, PLT in the last 72 hours. Recent Labs     06/11/22  0430 06/12/22  0458 06/13/22  0427    140 139   K 3.9 4.1 4.2    103 103   CO2 19* 22 24   BUN 20 22 24*   CREATININE 1.3* 1.4* 1.4*   CALCIUM 9.5 9.3 9.2     No results for input(s): AST, ALT, BILIDIR, BILITOT, ALKPHOS in the last 72 hours. No results for input(s): INR in the last 72 hours. No results for input(s): [employer] [place]ball in the last 72 hours. No results for input(s): AST, ALT, ALB, BILIDIR, BILITOT, ALKPHOS in the last 72 hours. No results for input(s): LACTA in the last 72 hours. Lab Results   Component Value Date    LABURIC 7.0 (H) 10/14/2019     No results found for: AMMONIA    Assessment:    Active Hospital Problems    Diagnosis Date Noted    Falls frequently [R29.6] 06/09/2022     Priority: Medium   HYPOKALEMIA   II DM   THYROID NODULE   NICK   CKD 3   CHANGE IN MENTAL STATUS   HLD  DIZZINESS  PLAN:  ORTHOSTATICS  SSI  PROZAC   NORVASC           DVT Prophylaxis: *LOVENOX  Diet: ADULT DIET;  Regular; 4 carb choices (60 gm/meal)  Code Status: Full Code     PT/OT Eval Status:  ORDERED     Dispo - *EVIN         Electronically signed by Sandra Contreras DO on 6/13/2022 at 2:23 PM [facility] normal (ped)...

## 2023-07-19 ENCOUNTER — APPOINTMENT (OUTPATIENT)
Dept: GENERAL RADIOLOGY | Age: 88
DRG: 690 | End: 2023-07-19
Payer: MEDICARE

## 2023-07-19 ENCOUNTER — APPOINTMENT (OUTPATIENT)
Dept: CT IMAGING | Age: 88
DRG: 690 | End: 2023-07-19
Payer: MEDICARE

## 2023-07-19 ENCOUNTER — HOSPITAL ENCOUNTER (INPATIENT)
Age: 88
LOS: 2 days | Discharge: NURSING, REHAB OR LONG-TERM CARE | DRG: 690 | End: 2023-07-21
Attending: EMERGENCY MEDICINE | Admitting: INTERNAL MEDICINE
Payer: MEDICARE

## 2023-07-19 DIAGNOSIS — N30.00 ACUTE CYSTITIS WITHOUT HEMATURIA: Primary | ICD-10-CM

## 2023-07-19 DIAGNOSIS — R53.1 GENERALIZED WEAKNESS: ICD-10-CM

## 2023-07-19 DIAGNOSIS — R26.2 UNABLE TO AMBULATE: ICD-10-CM

## 2023-07-19 LAB
ALBUMIN SERPL-MCNC: 3.8 G/DL (ref 3.5–5.2)
ALP SERPL-CCNC: 56 U/L (ref 35–104)
ALT SERPL-CCNC: 15 U/L (ref 0–32)
ANION GAP SERPL CALCULATED.3IONS-SCNC: 13 MMOL/L (ref 7–16)
AST SERPL-CCNC: 26 U/L (ref 0–31)
BACTERIA URNS QL MICRO: ABNORMAL
BASOPHILS # BLD: 0.05 K/UL (ref 0–0.2)
BASOPHILS NFR BLD: 1 % (ref 0–2)
BILIRUB SERPL-MCNC: 0.6 MG/DL (ref 0–1.2)
BILIRUB UR QL STRIP: NEGATIVE
BUN SERPL-MCNC: 34 MG/DL (ref 6–23)
CALCIUM SERPL-MCNC: 10.3 MG/DL (ref 8.6–10.2)
CHLORIDE SERPL-SCNC: 103 MMOL/L (ref 98–107)
CLARITY UR: ABNORMAL
CO2 SERPL-SCNC: 25 MMOL/L (ref 22–29)
COLOR UR: YELLOW
CREAT SERPL-MCNC: 1.1 MG/DL (ref 0.5–1)
EOSINOPHIL # BLD: 0.26 K/UL (ref 0.05–0.5)
EOSINOPHILS RELATIVE PERCENT: 2 % (ref 0–6)
ERYTHROCYTE [DISTWIDTH] IN BLOOD BY AUTOMATED COUNT: 13.2 % (ref 11.5–15)
GFR SERPL CREATININE-BSD FRML MDRD: 49 ML/MIN/1.73M2
GLUCOSE SERPL-MCNC: 79 MG/DL (ref 74–99)
GLUCOSE UR STRIP-MCNC: NEGATIVE MG/DL
HCT VFR BLD AUTO: 42.2 % (ref 34–48)
HGB BLD-MCNC: 14.2 G/DL (ref 11.5–15.5)
HGB UR QL STRIP.AUTO: NEGATIVE
IMM GRANULOCYTES # BLD AUTO: 0.04 K/UL (ref 0–0.58)
IMM GRANULOCYTES NFR BLD: 0 % (ref 0–5)
INR PPP: 1.1
KETONES UR STRIP-MCNC: 15 MG/DL
LACTATE BLDV-SCNC: 1.8 MMOL/L (ref 0.5–1.9)
LEUKOCYTE ESTERASE UR QL STRIP: ABNORMAL
LYMPHOCYTES NFR BLD: 0.89 K/UL (ref 1.5–4)
LYMPHOCYTES RELATIVE PERCENT: 8 % (ref 20–42)
MCH RBC QN AUTO: 31.1 PG (ref 26–35)
MCHC RBC AUTO-ENTMCNC: 33.6 G/DL (ref 32–34.5)
MCV RBC AUTO: 92.3 FL (ref 80–99.9)
MONOCYTES NFR BLD: 0.82 K/UL (ref 0.1–0.95)
MONOCYTES NFR BLD: 7 % (ref 2–12)
NEUTROPHILS NFR BLD: 81 % (ref 43–80)
NEUTS SEG NFR BLD: 8.97 K/UL (ref 1.8–7.3)
NITRITE UR QL STRIP: POSITIVE
PH UR STRIP: 6 [PH] (ref 5–9)
PH VENOUS: 7.46 (ref 7.35–7.45)
PLATELET # BLD AUTO: 329 K/UL (ref 130–450)
PMV BLD AUTO: 10.2 FL (ref 7–12)
POTASSIUM SERPL-SCNC: 3.4 MMOL/L (ref 3.5–5)
PROT SERPL-MCNC: 6.5 G/DL (ref 6.4–8.3)
PROT UR STRIP-MCNC: NEGATIVE MG/DL
PROTHROMBIN TIME: 11.6 SEC (ref 9.3–12.4)
RBC # BLD AUTO: 4.57 M/UL (ref 3.5–5.5)
RBC #/AREA URNS HPF: ABNORMAL /HPF
REASON FOR REJECTION: NORMAL
SARS-COV-2 RDRP RESP QL NAA+PROBE: NOT DETECTED
SODIUM SERPL-SCNC: 141 MMOL/L (ref 132–146)
SP GR UR STRIP: 1.02 (ref 1–1.03)
SPECIMEN DESCRIPTION: NORMAL
SPECIMEN SOURCE: NORMAL
TROPONIN I SERPL HS-MCNC: 22 NG/L (ref 0–9)
UROBILINOGEN UR STRIP-ACNC: 1 EU/DL (ref 0–1)
WBC #/AREA URNS HPF: ABNORMAL /HPF
WBC OTHER # BLD: 11 K/UL (ref 4.5–11.5)
ZZ NTE CLEAN UP: ORDERED TEST: NORMAL

## 2023-07-19 PROCEDURE — 85610 PROTHROMBIN TIME: CPT

## 2023-07-19 PROCEDURE — 93005 ELECTROCARDIOGRAM TRACING: CPT | Performed by: EMERGENCY MEDICINE

## 2023-07-19 PROCEDURE — 73562 X-RAY EXAM OF KNEE 3: CPT

## 2023-07-19 PROCEDURE — 72125 CT NECK SPINE W/O DYE: CPT

## 2023-07-19 PROCEDURE — 80053 COMPREHEN METABOLIC PANEL: CPT

## 2023-07-19 PROCEDURE — 87635 SARS-COV-2 COVID-19 AMP PRB: CPT

## 2023-07-19 PROCEDURE — 83605 ASSAY OF LACTIC ACID: CPT

## 2023-07-19 PROCEDURE — 2580000003 HC RX 258: Performed by: EMERGENCY MEDICINE

## 2023-07-19 PROCEDURE — 73502 X-RAY EXAM HIP UNI 2-3 VIEWS: CPT

## 2023-07-19 PROCEDURE — 85027 COMPLETE CBC AUTOMATED: CPT

## 2023-07-19 PROCEDURE — 71045 X-RAY EXAM CHEST 1 VIEW: CPT

## 2023-07-19 PROCEDURE — 81001 URINALYSIS AUTO W/SCOPE: CPT

## 2023-07-19 PROCEDURE — 6370000000 HC RX 637 (ALT 250 FOR IP): Performed by: EMERGENCY MEDICINE

## 2023-07-19 PROCEDURE — 2060000000 HC ICU INTERMEDIATE R&B

## 2023-07-19 PROCEDURE — 96374 THER/PROPH/DIAG INJ IV PUSH: CPT

## 2023-07-19 PROCEDURE — 82800 BLOOD PH: CPT

## 2023-07-19 PROCEDURE — 6360000002 HC RX W HCPCS: Performed by: EMERGENCY MEDICINE

## 2023-07-19 PROCEDURE — 84484 ASSAY OF TROPONIN QUANT: CPT

## 2023-07-19 PROCEDURE — 82010 KETONE BODYS QUAN: CPT

## 2023-07-19 PROCEDURE — 99285 EMERGENCY DEPT VISIT HI MDM: CPT

## 2023-07-19 PROCEDURE — 70450 CT HEAD/BRAIN W/O DYE: CPT

## 2023-07-19 PROCEDURE — 87077 CULTURE AEROBIC IDENTIFY: CPT

## 2023-07-19 PROCEDURE — 87086 URINE CULTURE/COLONY COUNT: CPT

## 2023-07-19 RX ORDER — INSULIN LISPRO 100 [IU]/ML
0-4 INJECTION, SOLUTION INTRAVENOUS; SUBCUTANEOUS
Status: DISCONTINUED | OUTPATIENT
Start: 2023-07-20 | End: 2023-07-21 | Stop reason: HOSPADM

## 2023-07-19 RX ORDER — HEPARIN SODIUM 10000 [USP'U]/ML
5000 INJECTION, SOLUTION INTRAVENOUS; SUBCUTANEOUS EVERY 8 HOURS
Status: DISCONTINUED | OUTPATIENT
Start: 2023-07-19 | End: 2023-07-21 | Stop reason: HOSPADM

## 2023-07-19 RX ORDER — DONEPEZIL HYDROCHLORIDE 5 MG/1
5 TABLET, FILM COATED ORAL NIGHTLY
Status: DISCONTINUED | OUTPATIENT
Start: 2023-07-19 | End: 2023-07-21 | Stop reason: HOSPADM

## 2023-07-19 RX ORDER — SODIUM CHLORIDE 9 MG/ML
INJECTION, SOLUTION INTRAVENOUS PRN
Status: DISCONTINUED | OUTPATIENT
Start: 2023-07-19 | End: 2023-07-21 | Stop reason: HOSPADM

## 2023-07-19 RX ORDER — ACETAMINOPHEN 325 MG/1
650 TABLET ORAL EVERY 6 HOURS PRN
Status: DISCONTINUED | OUTPATIENT
Start: 2023-07-19 | End: 2023-07-21 | Stop reason: HOSPADM

## 2023-07-19 RX ORDER — OXYBUTYNIN CHLORIDE 5 MG/1
5 TABLET ORAL 2 TIMES DAILY
Status: DISCONTINUED | OUTPATIENT
Start: 2023-07-19 | End: 2023-07-21 | Stop reason: HOSPADM

## 2023-07-19 RX ORDER — DOCUSATE SODIUM 100 MG/1
100 CAPSULE, LIQUID FILLED ORAL NIGHTLY
Status: DISCONTINUED | OUTPATIENT
Start: 2023-07-19 | End: 2023-07-21 | Stop reason: HOSPADM

## 2023-07-19 RX ORDER — PROCHLORPERAZINE EDISYLATE 5 MG/ML
5 INJECTION INTRAMUSCULAR; INTRAVENOUS EVERY 6 HOURS PRN
Status: DISCONTINUED | OUTPATIENT
Start: 2023-07-19 | End: 2023-07-21 | Stop reason: HOSPADM

## 2023-07-19 RX ORDER — ACETAMINOPHEN 650 MG/1
650 SUPPOSITORY RECTAL EVERY 6 HOURS PRN
Status: DISCONTINUED | OUTPATIENT
Start: 2023-07-19 | End: 2023-07-21 | Stop reason: HOSPADM

## 2023-07-19 RX ORDER — ASPIRIN 81 MG/1
81 TABLET ORAL DAILY
Status: DISCONTINUED | OUTPATIENT
Start: 2023-07-20 | End: 2023-07-21 | Stop reason: HOSPADM

## 2023-07-19 RX ORDER — SODIUM CHLORIDE 0.9 % (FLUSH) 0.9 %
5-40 SYRINGE (ML) INJECTION PRN
Status: DISCONTINUED | OUTPATIENT
Start: 2023-07-19 | End: 2023-07-21 | Stop reason: HOSPADM

## 2023-07-19 RX ORDER — INSULIN LISPRO 100 [IU]/ML
0-4 INJECTION, SOLUTION INTRAVENOUS; SUBCUTANEOUS NIGHTLY
Status: DISCONTINUED | OUTPATIENT
Start: 2023-07-19 | End: 2023-07-21 | Stop reason: HOSPADM

## 2023-07-19 RX ORDER — CLOPIDOGREL BISULFATE 75 MG/1
75 TABLET ORAL DAILY
Status: DISCONTINUED | OUTPATIENT
Start: 2023-07-20 | End: 2023-07-21 | Stop reason: HOSPADM

## 2023-07-19 RX ORDER — METOPROLOL SUCCINATE 25 MG/1
25 TABLET, EXTENDED RELEASE ORAL DAILY
Status: DISCONTINUED | OUTPATIENT
Start: 2023-07-20 | End: 2023-07-21 | Stop reason: HOSPADM

## 2023-07-19 RX ORDER — PRAVASTATIN SODIUM 20 MG
40 TABLET ORAL NIGHTLY
Status: DISCONTINUED | OUTPATIENT
Start: 2023-07-19 | End: 2023-07-21 | Stop reason: HOSPADM

## 2023-07-19 RX ORDER — FLUOXETINE HYDROCHLORIDE 20 MG/1
20 CAPSULE ORAL DAILY
Status: DISCONTINUED | OUTPATIENT
Start: 2023-07-20 | End: 2023-07-21 | Stop reason: HOSPADM

## 2023-07-19 RX ORDER — SODIUM CHLORIDE 0.9 % (FLUSH) 0.9 %
5-40 SYRINGE (ML) INJECTION EVERY 12 HOURS SCHEDULED
Status: DISCONTINUED | OUTPATIENT
Start: 2023-07-19 | End: 2023-07-21 | Stop reason: HOSPADM

## 2023-07-19 RX ORDER — LOSARTAN POTASSIUM 50 MG/1
50 TABLET ORAL DAILY
Status: DISCONTINUED | OUTPATIENT
Start: 2023-07-20 | End: 2023-07-21 | Stop reason: HOSPADM

## 2023-07-19 RX ORDER — AMLODIPINE BESYLATE 5 MG/1
5 TABLET ORAL DAILY
Status: DISCONTINUED | OUTPATIENT
Start: 2023-07-20 | End: 2023-07-21 | Stop reason: HOSPADM

## 2023-07-19 RX ORDER — POLYETHYLENE GLYCOL 3350 17 G/17G
17 POWDER, FOR SOLUTION ORAL DAILY PRN
Status: DISCONTINUED | OUTPATIENT
Start: 2023-07-19 | End: 2023-07-21 | Stop reason: HOSPADM

## 2023-07-19 RX ORDER — DEXTROSE MONOHYDRATE 100 MG/ML
INJECTION, SOLUTION INTRAVENOUS CONTINUOUS PRN
Status: DISCONTINUED | OUTPATIENT
Start: 2023-07-19 | End: 2023-07-21 | Stop reason: HOSPADM

## 2023-07-19 RX ORDER — POTASSIUM CHLORIDE 20 MEQ/1
20 TABLET, EXTENDED RELEASE ORAL ONCE
Status: COMPLETED | OUTPATIENT
Start: 2023-07-19 | End: 2023-07-19

## 2023-07-19 RX ORDER — 0.9 % SODIUM CHLORIDE 0.9 %
500 INTRAVENOUS SOLUTION INTRAVENOUS ONCE
Status: COMPLETED | OUTPATIENT
Start: 2023-07-19 | End: 2023-07-19

## 2023-07-19 RX ADMIN — WATER 1000 MG: 1 INJECTION INTRAMUSCULAR; INTRAVENOUS; SUBCUTANEOUS at 20:22

## 2023-07-19 RX ADMIN — SODIUM CHLORIDE 500 ML: 9 INJECTION, SOLUTION INTRAVENOUS at 18:51

## 2023-07-19 RX ADMIN — POTASSIUM CHLORIDE 20 MEQ: 1500 TABLET, EXTENDED RELEASE ORAL at 20:57

## 2023-07-19 ASSESSMENT — LIFESTYLE VARIABLES
HOW OFTEN DO YOU HAVE A DRINK CONTAINING ALCOHOL: NEVER
HOW MANY STANDARD DRINKS CONTAINING ALCOHOL DO YOU HAVE ON A TYPICAL DAY: PATIENT DOES NOT DRINK

## 2023-07-19 ASSESSMENT — PAIN - FUNCTIONAL ASSESSMENT: PAIN_FUNCTIONAL_ASSESSMENT: NONE - DENIES PAIN

## 2023-07-19 NOTE — ED PROVIDER NOTES
listed below. LABS:  Results for orders placed or performed during the hospital encounter of 07/19/23   COVID-19, Rapid    Specimen: Nasopharyngeal Swab   Result Value Ref Range    Specimen Description . NASOPHARYNGEAL SWAB     SARS-CoV-2, Rapid Not Detected Not Detected   Culture, Blood 1    Specimen: Blood   Result Value Ref Range    Specimen Description . BLOOD     Special Requests          Culture NO GROWTH 1 DAY    Culture, Blood 2    Specimen: Blood   Result Value Ref Range    Specimen Description . BLOOD     Special Requests          Culture NO GROWTH 1 DAY    CBC with Auto Differential   Result Value Ref Range    WBC 11.0 4.5 - 11.5 k/uL    RBC 4.57 3.50 - 5.50 m/uL    Hemoglobin 14.2 11.5 - 15.5 g/dL    Hematocrit 42.2 34.0 - 48.0 %    MCV 92.3 80.0 - 99.9 fL    MCH 31.1 26.0 - 35.0 pg    MCHC 33.6 32.0 - 34.5 g/dL    RDW 13.2 11.5 - 15.0 %    Platelets 444 904 - 239 k/uL    MPV 10.2 7.0 - 12.0 fL    Neutrophils % 81 (H) 43.0 - 80.0 %    Lymphocytes % 8 (L) 20.0 - 42.0 %    Monocytes % 7 2.0 - 12.0 %    Eosinophils % 2 0 - 6 %    Basophils % 1 0.0 - 2.0 %    Immature Granulocytes 0 0.0 - 5.0 %    Neutrophils Absolute 8.97 (H) 1.80 - 7.30 k/uL    Lymphocytes Absolute 0.89 (L) 1.50 - 4.00 k/uL    Monocytes Absolute 0.82 0.10 - 0.95 k/uL    Eosinophils Absolute 0.26 0.05 - 0.50 k/uL    Basophils Absolute 0.05 0.00 - 0.20 k/uL    Absolute Immature Granulocyte 0.04 0.00 - 0.58 k/uL   Comprehensive Metabolic Panel   Result Value Ref Range    Glucose 79 74 - 99 mg/dL    BUN 34 (H) 6 - 23 mg/dL    Creatinine 1.1 (H) 0.50 - 1.00 mg/dL    Est, Glom Filt Rate 49 (L) >60 mL/min/1.73m2    Calcium 10.3 (H) 8.6 - 10.2 mg/dL    Sodium 141 132 - 146 mmol/L    Potassium 3.4 (L) 3.5 - 5.0 mmol/L    Chloride 103 98 - 107 mmol/L    CO2 25 22 - 29 mmol/L    Anion Gap 13 7 - 16 mmol/L    Alkaline Phosphatase 56 35 - 104 U/L    ALT 15 0 - 32 U/L    AST 26 0 - 31 U/L    Total Bilirubin 0.6 0.0 - 1.2 mg/dL    Total Protein 6.5 6.4

## 2023-07-20 LAB
ANION GAP SERPL CALCULATED.3IONS-SCNC: 13 MMOL/L (ref 7–16)
B-OH-BUTYR SERPL-MCNC: 0.48 MMOL/L (ref 0.02–0.27)
BASOPHILS # BLD: 0.04 K/UL (ref 0–0.2)
BASOPHILS NFR BLD: 1 % (ref 0–2)
BUN SERPL-MCNC: 31 MG/DL (ref 6–23)
CALCIUM SERPL-MCNC: 10.2 MG/DL (ref 8.6–10.2)
CHLORIDE SERPL-SCNC: 101 MMOL/L (ref 98–107)
CO2 SERPL-SCNC: 27 MMOL/L (ref 22–29)
CREAT SERPL-MCNC: 1 MG/DL (ref 0.5–1)
EKG ATRIAL RATE: 70 BPM
EKG P AXIS: -25 DEGREES
EKG P-R INTERVAL: 242 MS
EKG Q-T INTERVAL: 470 MS
EKG QRS DURATION: 132 MS
EKG QTC CALCULATION (BAZETT): 507 MS
EKG R AXIS: -28 DEGREES
EKG T AXIS: -21 DEGREES
EKG VENTRICULAR RATE: 70 BPM
EOSINOPHIL # BLD: 0.17 K/UL (ref 0.05–0.5)
EOSINOPHILS RELATIVE PERCENT: 2 % (ref 0–6)
ERYTHROCYTE [DISTWIDTH] IN BLOOD BY AUTOMATED COUNT: 13.2 % (ref 11.5–15)
GFR SERPL CREATININE-BSD FRML MDRD: 54 ML/MIN/1.73M2
GLUCOSE SERPL-MCNC: 93 MG/DL (ref 74–99)
HCT VFR BLD AUTO: 40.5 % (ref 34–48)
HGB BLD-MCNC: 13.5 G/DL (ref 11.5–15.5)
IMM GRANULOCYTES # BLD AUTO: 0.04 K/UL (ref 0–0.58)
IMM GRANULOCYTES NFR BLD: 1 % (ref 0–5)
LYMPHOCYTES NFR BLD: 0.7 K/UL (ref 1.5–4)
LYMPHOCYTES RELATIVE PERCENT: 8 % (ref 20–42)
MAGNESIUM SERPL-MCNC: 1.7 MG/DL (ref 1.6–2.6)
MCH RBC QN AUTO: 31 PG (ref 26–35)
MCHC RBC AUTO-ENTMCNC: 33.3 G/DL (ref 32–34.5)
MCV RBC AUTO: 93.1 FL (ref 80–99.9)
METER GLUCOSE: 107 MG/DL (ref 74–99)
METER GLUCOSE: 134 MG/DL (ref 74–99)
METER GLUCOSE: 134 MG/DL (ref 74–99)
METER GLUCOSE: 163 MG/DL (ref 74–99)
METER GLUCOSE: 95 MG/DL (ref 74–99)
MONOCYTES NFR BLD: 0.88 K/UL (ref 0.1–0.95)
MONOCYTES NFR BLD: 10 % (ref 2–12)
NEUTROPHILS NFR BLD: 79 % (ref 43–80)
NEUTS SEG NFR BLD: 7 K/UL (ref 1.8–7.3)
PLATELET # BLD AUTO: 289 K/UL (ref 130–450)
PMV BLD AUTO: 10 FL (ref 7–12)
POTASSIUM SERPL-SCNC: 3.1 MMOL/L (ref 3.5–5)
RBC # BLD AUTO: 4.35 M/UL (ref 3.5–5.5)
SODIUM SERPL-SCNC: 141 MMOL/L (ref 132–146)
TROPONIN I SERPL HS-MCNC: 23 NG/L (ref 0–9)
TROPONIN I SERPL HS-MCNC: 26 NG/L (ref 0–9)
WBC OTHER # BLD: 8.8 K/UL (ref 4.5–11.5)

## 2023-07-20 PROCEDURE — 84484 ASSAY OF TROPONIN QUANT: CPT

## 2023-07-20 PROCEDURE — 2580000003 HC RX 258: Performed by: NURSE PRACTITIONER

## 2023-07-20 PROCEDURE — 6370000000 HC RX 637 (ALT 250 FOR IP): Performed by: INTERNAL MEDICINE

## 2023-07-20 PROCEDURE — 85027 COMPLETE CBC AUTOMATED: CPT

## 2023-07-20 PROCEDURE — 83735 ASSAY OF MAGNESIUM: CPT

## 2023-07-20 PROCEDURE — 2060000000 HC ICU INTERMEDIATE R&B

## 2023-07-20 PROCEDURE — 97161 PT EVAL LOW COMPLEX 20 MIN: CPT

## 2023-07-20 PROCEDURE — 93010 ELECTROCARDIOGRAM REPORT: CPT | Performed by: INTERNAL MEDICINE

## 2023-07-20 PROCEDURE — 97165 OT EVAL LOW COMPLEX 30 MIN: CPT

## 2023-07-20 PROCEDURE — 80048 BASIC METABOLIC PNL TOTAL CA: CPT

## 2023-07-20 PROCEDURE — 6360000002 HC RX W HCPCS: Performed by: NURSE PRACTITIONER

## 2023-07-20 PROCEDURE — 82947 ASSAY GLUCOSE BLOOD QUANT: CPT

## 2023-07-20 PROCEDURE — 97535 SELF CARE MNGMENT TRAINING: CPT

## 2023-07-20 PROCEDURE — 6370000000 HC RX 637 (ALT 250 FOR IP): Performed by: NURSE PRACTITIONER

## 2023-07-20 PROCEDURE — 87040 BLOOD CULTURE FOR BACTERIA: CPT

## 2023-07-20 PROCEDURE — 6360000002 HC RX W HCPCS: Performed by: INTERNAL MEDICINE

## 2023-07-20 RX ORDER — MAGNESIUM SULFATE IN WATER 40 MG/ML
2000 INJECTION, SOLUTION INTRAVENOUS ONCE
Status: COMPLETED | OUTPATIENT
Start: 2023-07-20 | End: 2023-07-20

## 2023-07-20 RX ORDER — FOLIC ACID 1 MG/1
1 TABLET ORAL DAILY
COMMUNITY

## 2023-07-20 RX ORDER — HYDRALAZINE HYDROCHLORIDE 20 MG/ML
10 INJECTION INTRAMUSCULAR; INTRAVENOUS ONCE
Status: COMPLETED | OUTPATIENT
Start: 2023-07-20 | End: 2023-07-20

## 2023-07-20 RX ORDER — GLIPIZIDE 5 MG/1
5 TABLET ORAL
Status: ON HOLD | COMMUNITY
End: 2023-07-21 | Stop reason: HOSPADM

## 2023-07-20 RX ORDER — ACETAMINOPHEN 160 MG
TABLET,DISINTEGRATING ORAL
COMMUNITY

## 2023-07-20 RX ORDER — HYDRALAZINE HYDROCHLORIDE 20 MG/ML
10 INJECTION INTRAMUSCULAR; INTRAVENOUS EVERY 6 HOURS PRN
Status: DISCONTINUED | OUTPATIENT
Start: 2023-07-20 | End: 2023-07-21 | Stop reason: HOSPADM

## 2023-07-20 RX ORDER — HYDROXYZINE HYDROCHLORIDE 25 MG/1
25 TABLET, FILM COATED ORAL 3 TIMES DAILY PRN
Status: ON HOLD | COMMUNITY
End: 2023-07-21 | Stop reason: HOSPADM

## 2023-07-20 RX ORDER — POTASSIUM CHLORIDE 20 MEQ/1
40 TABLET, EXTENDED RELEASE ORAL 2 TIMES DAILY WITH MEALS
Status: COMPLETED | OUTPATIENT
Start: 2023-07-20 | End: 2023-07-20

## 2023-07-20 RX ORDER — LANOLIN ALCOHOL/MO/W.PET/CERES
1000 CREAM (GRAM) TOPICAL DAILY
Status: ON HOLD | COMMUNITY
End: 2023-07-21 | Stop reason: HOSPADM

## 2023-07-20 RX ADMIN — METOPROLOL SUCCINATE 25 MG: 25 TABLET, EXTENDED RELEASE ORAL at 07:43

## 2023-07-20 RX ADMIN — HEPARIN SODIUM 5000 UNITS: 10000 INJECTION INTRAVENOUS; SUBCUTANEOUS at 23:37

## 2023-07-20 RX ADMIN — HYDRALAZINE HYDROCHLORIDE 10 MG: 20 INJECTION INTRAMUSCULAR; INTRAVENOUS at 05:15

## 2023-07-20 RX ADMIN — POTASSIUM CHLORIDE 40 MEQ: 1500 TABLET, EXTENDED RELEASE ORAL at 16:16

## 2023-07-20 RX ADMIN — HEPARIN SODIUM 5000 UNITS: 10000 INJECTION INTRAVENOUS; SUBCUTANEOUS at 15:03

## 2023-07-20 RX ADMIN — DOCUSATE SODIUM 100 MG: 100 CAPSULE, LIQUID FILLED ORAL at 00:02

## 2023-07-20 RX ADMIN — MAGNESIUM SULFATE HEPTAHYDRATE 2000 MG: 40 INJECTION, SOLUTION INTRAVENOUS at 10:33

## 2023-07-20 RX ADMIN — WATER 1000 MG: 1 INJECTION INTRAMUSCULAR; INTRAVENOUS; SUBCUTANEOUS at 21:06

## 2023-07-20 RX ADMIN — SODIUM CHLORIDE, PRESERVATIVE FREE 10 ML: 5 INJECTION INTRAVENOUS at 21:10

## 2023-07-20 RX ADMIN — PRAVASTATIN SODIUM 40 MG: 20 TABLET ORAL at 21:09

## 2023-07-20 RX ADMIN — OXYBUTYNIN CHLORIDE 5 MG: 5 TABLET ORAL at 00:02

## 2023-07-20 RX ADMIN — LOSARTAN POTASSIUM 50 MG: 50 TABLET, FILM COATED ORAL at 07:43

## 2023-07-20 RX ADMIN — PRAVASTATIN SODIUM 40 MG: 20 TABLET ORAL at 00:02

## 2023-07-20 RX ADMIN — HEPARIN SODIUM 5000 UNITS: 10000 INJECTION INTRAVENOUS; SUBCUTANEOUS at 07:47

## 2023-07-20 RX ADMIN — CLOPIDOGREL BISULFATE 75 MG: 75 TABLET ORAL at 07:43

## 2023-07-20 RX ADMIN — ACETAMINOPHEN 650 MG: 325 TABLET ORAL at 21:09

## 2023-07-20 RX ADMIN — HEPARIN SODIUM 5000 UNITS: 10000 INJECTION INTRAVENOUS; SUBCUTANEOUS at 00:02

## 2023-07-20 RX ADMIN — DONEPEZIL HYDROCHLORIDE 5 MG: 5 TABLET, FILM COATED ORAL at 00:02

## 2023-07-20 RX ADMIN — DOCUSATE SODIUM 100 MG: 100 CAPSULE, LIQUID FILLED ORAL at 21:08

## 2023-07-20 RX ADMIN — ASPIRIN 81 MG: 81 TABLET, COATED ORAL at 07:43

## 2023-07-20 RX ADMIN — FLUOXETINE 20 MG: 20 CAPSULE ORAL at 07:43

## 2023-07-20 RX ADMIN — POTASSIUM CHLORIDE 40 MEQ: 1500 TABLET, EXTENDED RELEASE ORAL at 10:33

## 2023-07-20 RX ADMIN — OXYBUTYNIN CHLORIDE 5 MG: 5 TABLET ORAL at 21:08

## 2023-07-20 RX ADMIN — SODIUM CHLORIDE, PRESERVATIVE FREE 10 ML: 5 INJECTION INTRAVENOUS at 07:45

## 2023-07-20 RX ADMIN — OXYBUTYNIN CHLORIDE 5 MG: 5 TABLET ORAL at 07:43

## 2023-07-20 RX ADMIN — DONEPEZIL HYDROCHLORIDE 5 MG: 5 TABLET, FILM COATED ORAL at 21:08

## 2023-07-20 RX ADMIN — HYDRALAZINE HYDROCHLORIDE 10 MG: 20 INJECTION INTRAMUSCULAR; INTRAVENOUS at 00:35

## 2023-07-20 RX ADMIN — AMLODIPINE BESYLATE 5 MG: 5 TABLET ORAL at 07:43

## 2023-07-20 RX ADMIN — SODIUM CHLORIDE, PRESERVATIVE FREE 10 ML: 5 INJECTION INTRAVENOUS at 00:03

## 2023-07-20 ASSESSMENT — PAIN DESCRIPTION - ORIENTATION
ORIENTATION: POSTERIOR;LOWER
ORIENTATION: MID
ORIENTATION: POSTERIOR

## 2023-07-20 ASSESSMENT — PAIN SCALES - GENERAL
PAINLEVEL_OUTOF10: 5
PAINLEVEL_OUTOF10: 0
PAINLEVEL_OUTOF10: 8
PAINLEVEL_OUTOF10: 0
PAINLEVEL_OUTOF10: 3
PAINLEVEL_OUTOF10: 0
PAINLEVEL_OUTOF10: 0

## 2023-07-20 ASSESSMENT — PAIN DESCRIPTION - LOCATION
LOCATION: HEAD

## 2023-07-20 ASSESSMENT — PAIN - FUNCTIONAL ASSESSMENT
PAIN_FUNCTIONAL_ASSESSMENT: ACTIVITIES ARE NOT PREVENTED
PAIN_FUNCTIONAL_ASSESSMENT: ACTIVITIES ARE NOT PREVENTED

## 2023-07-20 ASSESSMENT — PAIN DESCRIPTION - DESCRIPTORS
DESCRIPTORS: PRESSURE;POUNDING
DESCRIPTORS: ACHING
DESCRIPTORS: SQUEEZING;THROBBING

## 2023-07-20 NOTE — ACP (ADVANCE CARE PLANNING)
Advance Care Planning   Healthcare Decision Maker:    Primary Decision Maker: Trino [facility] - [phone]    Click here to complete Healthcare Decision Makers including selection of the Healthcare Decision Maker Relationship (ie \"Primary\"). (0) Normal

## 2023-07-20 NOTE — PLAN OF CARE
Problem: Skin/Tissue Integrity  Goal: Absence of new skin breakdown  Description: 1. Monitor for areas of redness and/or skin breakdown  2. Assess vascular access sites hourly  3. Every 4-6 hours minimum:  Change oxygen saturation probe site  4. Every 4-6 hours:  If on nasal continuous positive airway pressure, respiratory therapy assess nares and determine need for appliance change or resting period.   7/20/2023 1303 by Zabrina Scruggs RN  Outcome: Progressing  7/20/2023 0322 by Ellen Gordillo RN  Outcome: Progressing     Problem: Safety - Adult  Goal: Free from fall injury  7/20/2023 1303 by Zabrina Scruggs RN  Outcome: Progressing  7/20/2023 0322 by Ellen Gordillo RN  Outcome: Progressing     Problem: ABCDS Injury Assessment  Goal: Absence of physical injury  7/20/2023 1303 by Zabrina Scruggs RN  Outcome: Progressing  7/20/2023 0322 by Ellen Gordillo RN  Outcome: Progressing

## 2023-07-20 NOTE — CARE COORDINATION
Social Work:    (Admitted due to weakness, UTI) Social service met with Mrs. Jamia Bello son Rossy Park, advised them about social service role and discussed discharge planning. Mrs. Jamia Bello resides in a 2-story home with her bed/bathroom located on the main floor. She used a wheeled walker prior to admission and has wheelchair. Mrs. Betzy Brito PCP is Dr. Zeynep Morrow. Rossy Park advises of recurrent falls and father's inability to get patient off the floor. We discussed HHC, DME, SNF, and resources for future planning. Rossy Park explained that [facility] [place] and states that they prefer return to there once more. Mrs. Jamia Bello agreed. Rossy Park is also interested in resources. Social work called a referral in to Doreen at [facility]. Await her evaluation. Social service provided [facility] , assisted living, private duty, day care, resources today.     Electronically signed by JERMAINE Florian on 7/20/2023 at 11:40 AM

## 2023-07-20 NOTE — H&P
Hospital Medicine History & Physical      PCP: Chayo Pack MD    Date of Admission: 2023    Date of Service: . 2023    Chief Complaint:   FATIGUE       History Of Present Illness:     81 y.o. female presented with FATIGUE. SHE IS A VERY POOR HISTORIAN, NO FAMILY PRESENT, FROM THE EMR IT APPERS HER SON BROUGHT HER IN AND SHE HAD LEFT LEG PAIN AND WEAKNESS, ALL XRAYS WERE NEG. 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  Authorizing Provider   folic acid (FOLVITE) 1 MG tablet Take 1 tablet by mouth daily   Yes Historical Provider, MD   glipiZIDE (GLUCOTROL) 5 MG tablet Take 1 tablet by mouth 2 times daily (before meals)   Yes Historical Provider, MD   hydrOXYzine HCl (ATARAX) 25 MG tablet Take 1 tablet by mouth 3 times daily as needed for Itching   Yes Historical Provider, MD   vitamin B-12 (CYANOCOBALAMIN) 1000 MCG tablet Take 1 tablet by mouth daily   Yes Historical Provider, MD   Cholecalciferol (VITAMIN D3) 50 MCG ( UT) CAPS Take by mouth   Yes Historical Provider, MD   insulin lispro (HUMALOG) 100 UNIT/ML SOLN injection vial Inject 0-12 Units into the skin 3 times daily (with meals)  Patient not taking: Reported on 2023   Dali Luong

## 2023-07-21 VITALS
RESPIRATION RATE: 18 BRPM | TEMPERATURE: 97.1 F | HEIGHT: 66 IN | DIASTOLIC BLOOD PRESSURE: 72 MMHG | HEART RATE: 81 BPM | OXYGEN SATURATION: 97 % | WEIGHT: 175 LBS | BODY MASS INDEX: 28.12 KG/M2 | SYSTOLIC BLOOD PRESSURE: 154 MMHG

## 2023-07-21 LAB
ANION GAP SERPL CALCULATED.3IONS-SCNC: 14 MMOL/L (ref 7–16)
BUN SERPL-MCNC: 23 MG/DL (ref 6–23)
CALCIUM SERPL-MCNC: 9.5 MG/DL (ref 8.6–10.2)
CHLORIDE SERPL-SCNC: 102 MMOL/L (ref 98–107)
CO2 SERPL-SCNC: 22 MMOL/L (ref 22–29)
CREAT SERPL-MCNC: 1 MG/DL (ref 0.5–1)
GFR SERPL CREATININE-BSD FRML MDRD: 57 ML/MIN/1.73M2
GLUCOSE SERPL-MCNC: 115 MG/DL (ref 74–99)
METER GLUCOSE: 133 MG/DL (ref 74–99)
METER GLUCOSE: 138 MG/DL (ref 74–99)
POTASSIUM SERPL-SCNC: 3.9 MMOL/L (ref 3.5–5)
SODIUM SERPL-SCNC: 138 MMOL/L (ref 132–146)

## 2023-07-21 PROCEDURE — 6360000002 HC RX W HCPCS: Performed by: NURSE PRACTITIONER

## 2023-07-21 PROCEDURE — 6370000000 HC RX 637 (ALT 250 FOR IP): Performed by: INTERNAL MEDICINE

## 2023-07-21 PROCEDURE — 6370000000 HC RX 637 (ALT 250 FOR IP): Performed by: NURSE PRACTITIONER

## 2023-07-21 PROCEDURE — 82947 ASSAY GLUCOSE BLOOD QUANT: CPT

## 2023-07-21 PROCEDURE — 2580000003 HC RX 258: Performed by: NURSE PRACTITIONER

## 2023-07-21 PROCEDURE — 80048 BASIC METABOLIC PNL TOTAL CA: CPT

## 2023-07-21 RX ORDER — INSULIN LISPRO 100 [IU]/ML
0-4 INJECTION, SOLUTION INTRAVENOUS; SUBCUTANEOUS NIGHTLY
DISCHARGE
Start: 2023-07-21

## 2023-07-21 RX ORDER — CEFDINIR 300 MG/1
300 CAPSULE ORAL 2 TIMES DAILY
Qty: 14 CAPSULE | Refills: 0 | DISCHARGE
Start: 2023-07-21 | End: 2023-07-28

## 2023-07-21 RX ORDER — INSULIN LISPRO 100 [IU]/ML
0-4 INJECTION, SOLUTION INTRAVENOUS; SUBCUTANEOUS
DISCHARGE
Start: 2023-07-21

## 2023-07-21 RX ORDER — LOSARTAN POTASSIUM 50 MG/1
50 TABLET ORAL DAILY
Qty: 30 TABLET | Refills: 3 | DISCHARGE
Start: 2023-07-22

## 2023-07-21 RX ADMIN — FLUOXETINE 20 MG: 20 CAPSULE ORAL at 09:07

## 2023-07-21 RX ADMIN — SODIUM CHLORIDE, PRESERVATIVE FREE 10 ML: 5 INJECTION INTRAVENOUS at 09:07

## 2023-07-21 RX ADMIN — CLOPIDOGREL BISULFATE 75 MG: 75 TABLET ORAL at 09:07

## 2023-07-21 RX ADMIN — METOPROLOL SUCCINATE 25 MG: 25 TABLET, EXTENDED RELEASE ORAL at 09:07

## 2023-07-21 RX ADMIN — HEPARIN SODIUM 5000 UNITS: 10000 INJECTION INTRAVENOUS; SUBCUTANEOUS at 06:56

## 2023-07-21 RX ADMIN — PETROLATUM: 420 OINTMENT TOPICAL at 10:49

## 2023-07-21 RX ADMIN — ASPIRIN 81 MG: 81 TABLET, COATED ORAL at 09:07

## 2023-07-21 RX ADMIN — HYDRALAZINE HYDROCHLORIDE 10 MG: 20 INJECTION INTRAMUSCULAR; INTRAVENOUS at 06:53

## 2023-07-21 RX ADMIN — AMLODIPINE BESYLATE 5 MG: 5 TABLET ORAL at 09:07

## 2023-07-21 RX ADMIN — OXYBUTYNIN CHLORIDE 5 MG: 5 TABLET ORAL at 09:07

## 2023-07-21 RX ADMIN — LOSARTAN POTASSIUM 50 MG: 50 TABLET, FILM COATED ORAL at 09:07

## 2023-07-21 NOTE — DISCHARGE INSTR - COC
Hospital Discharge:   Respiratory Treatments: NA  Oxygen Therapy:  is not on home oxygen therapy. Ventilator:    - No ventilator support    Rehab Therapies: Physical Therapy and Occupational Therapy  Weight Bearing Status/Restrictions: No weight bearing restrictions  Other Medical Equipment (for information only, NOT a DME order):  walker, bedside commode, and hospital bed  Other Treatments:       Patient's personal belongings (please select all that are sent with patient):  Donna    RN SIGNATURE:  Electronically signed by Author Kourtney RN on 7/21/23 at 3:19 PM EDT    CASE MANAGEMENT/SOCIAL WORK SECTION    Inpatient Status Date: ***    Readmission Risk Assessment Score:  Readmission Risk              Risk of Unplanned Readmission:  14           Discharging to Facility/ Agency   Name:   Address:  Phone:  Fax:    Dialysis Facility (if applicable)   Name:  Address:  Dialysis Schedule:  Phone:  Fax:    / signature: {Esignature:[id number]}    PHYSICIAN SECTION    Prognosis: {Prognosis:8747729310}    Condition at Discharge: [address] Patient Condition:142973208}    Rehab Potential (if transferring to Rehab): {Prognosis:1169350417}    Recommended Labs or Other Treatments After Discharge: ***    Physician Certification: I certify the above information and transfer of Lindsey Winchester  is necessary for the continuing treatment of the diagnosis listed and that she requires {Admit to Appropriate Level of Care:32521} for {GREATER/LESS:255052970} 30 days.      Update Admission H&P: {CHP DME Changes in KFXMJ:344071204}    PHYSICIAN SIGNATURE:  Electronically signed by Lizeth Mendosa DO on 7/21/23 at 1:07 PM EDT

## 2023-07-21 NOTE — CARE COORDINATION
7/21/2023 Social Work Discharge Planning:Pt will go to [facility] . Auth received. LYNETTE,[id number] and transport form are completed. Electronically signed by JERMAINE Stout on 7/21/2023 at 11:19 AM    7/21/2023  Social Work Discharge Planning: set up [employer] transport via [employer] and [employer] for Pt to go to [facility] today at [employer]. Nurse here, EVIN liaison and spouse (vm) were notified. Electronically signed by JERMAINE Stout on 7/21/2023 at 11:53 AM

## 2023-07-21 NOTE — PLAN OF CARE
Problem: Discharge Planning  Goal: Discharge to home or other facility with appropriate resources  7/21/2023 1229 by Henry Reno RN  Outcome: Progressing     Problem: Skin/Tissue Integrity  Goal: Absence of new skin breakdown  Description: 1. Monitor for areas of redness and/or skin breakdown  2. Assess vascular access sites hourly  3. Every 4-6 hours minimum:  Change oxygen saturation probe site  4. Every 4-6 hours:  If on nasal continuous positive airway pressure, respiratory therapy assess nares and determine need for appliance change or resting period.   7/21/2023 1229 by Henry Reno RN  Outcome: Progressing     Problem: Safety - Adult  Goal: Free from fall injury  7/21/2023 1229 by Henry Reno RN  Outcome: Progressing

## 2023-07-22 LAB
25(OH)D3 SERPL-MCNC: 74.3 NG/ML (ref 30–100)
ALBUMIN SERPL-MCNC: 3.9 G/DL (ref 3.5–5.2)
ALP SERPL-CCNC: 61 U/L (ref 35–104)
ALT SERPL-CCNC: 30 U/L (ref 0–32)
ANION GAP SERPL CALCULATED.3IONS-SCNC: 15 MMOL/L (ref 7–16)
AST SERPL-CCNC: 45 U/L (ref 0–31)
BILIRUB SERPL-MCNC: 0.4 MG/DL (ref 0–1.2)
BUN SERPL-MCNC: 24 MG/DL (ref 6–23)
CALCIUM SERPL-MCNC: 9.3 MG/DL (ref 8.6–10.2)
CHLORIDE SERPL-SCNC: 103 MMOL/L (ref 98–107)
CHOLEST SERPL-MCNC: 100 MG/DL
CO2 SERPL-SCNC: 22 MMOL/L (ref 22–29)
CREAT SERPL-MCNC: 1.1 MG/DL (ref 0.5–1)
GFR SERPL CREATININE-BSD FRML MDRD: 47 ML/MIN/1.73M2
GLUCOSE SERPL-MCNC: 113 MG/DL (ref 74–99)
HDLC SERPL-MCNC: 43 MG/DL
LDLC SERPL CALC-MCNC: 37 MG/DL
POTASSIUM SERPL-SCNC: 4.2 MMOL/L (ref 3.5–5)
PROT SERPL-MCNC: 6.6 G/DL (ref 6.4–8.3)
SODIUM SERPL-SCNC: 140 MMOL/L (ref 132–146)
TRIGL SERPL-MCNC: 100 MG/DL
VLDLC SERPL CALC-MCNC: 20 MG/DL

## 2023-07-23 LAB
MICROORGANISM SPEC CULT: ABNORMAL
MICROORGANISM SPEC CULT: ABNORMAL
MICROORGANISM SPEC CULT: NORMAL
MICROORGANISM SPEC CULT: NORMAL
SERVICE CMNT-IMP: NORMAL
SERVICE CMNT-IMP: NORMAL
SPECIMEN DESCRIPTION: ABNORMAL
SPECIMEN DESCRIPTION: NORMAL
SPECIMEN DESCRIPTION: NORMAL

## 2023-07-24 ENCOUNTER — OUTSIDE SERVICES (OUTPATIENT)
Dept: PRIMARY CARE CLINIC | Age: 88
End: 2023-07-24

## 2023-07-24 DIAGNOSIS — I10 ESSENTIAL HYPERTENSION, BENIGN: Chronic | ICD-10-CM

## 2023-07-24 DIAGNOSIS — N30.00 ACUTE CYSTITIS WITHOUT HEMATURIA: ICD-10-CM

## 2023-07-24 DIAGNOSIS — R29.6 FALLS FREQUENTLY: Primary | ICD-10-CM

## 2023-07-24 DIAGNOSIS — E78.5 HYPERLIPIDEMIA WITH TARGET LDL LESS THAN 100: Chronic | ICD-10-CM

## 2023-07-24 DIAGNOSIS — R53.1 GENERAL WEAKNESS: ICD-10-CM

## 2023-07-25 LAB
ANION GAP SERPL CALCULATED.3IONS-SCNC: 17 MMOL/L (ref 7–16)
BUN SERPL-MCNC: 33 MG/DL (ref 6–23)
CALCIUM SERPL-MCNC: 9.3 MG/DL (ref 8.6–10.2)
CHLORIDE SERPL-SCNC: 106 MMOL/L (ref 98–107)
CO2 SERPL-SCNC: 18 MMOL/L (ref 22–29)
CREAT SERPL-MCNC: 1.3 MG/DL (ref 0.5–1)
GFR SERPL CREATININE-BSD FRML MDRD: 40 ML/MIN/1.73M2
GLUCOSE SERPL-MCNC: 100 MG/DL (ref 74–99)
MICROORGANISM SPEC CULT: NORMAL
MICROORGANISM SPEC CULT: NORMAL
POTASSIUM SERPL-SCNC: 4.2 MMOL/L (ref 3.5–5)
SERVICE CMNT-IMP: NORMAL
SERVICE CMNT-IMP: NORMAL
SODIUM SERPL-SCNC: 141 MMOL/L (ref 132–146)
SPECIMEN DESCRIPTION: NORMAL
SPECIMEN DESCRIPTION: NORMAL

## 2023-07-25 NOTE — PROGRESS NOTES
07/22/2023    HDL 35 06/15/2022    HDL 39 04/01/2015     Lab Results   Component Value Date    LDLCHOLESTEROL 37 07/22/2023    LDLCALC 55 06/15/2022    LDLCALC 61 04/01/2015     Lab Results   Component Value Date    LABVLDL 21 06/15/2022    LABVLDL 41 04/01/2015    VLDL 20 07/22/2023     No results found for: [facility]  Lab Results   Component Value Date    TSH 2.630 06/15/2022               ASSESSMENT/PLAN:  1. Falls frequently  2. Acute cystitis without hematuria  3. Essential hypertension, benign  4. Hyperlipidemia with target LDL less than 100  5. General weakness           Plan: Skilled nursing and rehab services. Labs reviewed- hypokalemia noted- will replete and recheck w BMP in 2 d. Medication reconciliation completed- see orders in chart. Acute medical issues per NP/physician on-call. Begin discharge planning. An electronic signature was used to authenticate this note.     --Brendan Ni, DO

## 2023-07-27 LAB
ANION GAP SERPL CALCULATED.3IONS-SCNC: 13 MMOL/L (ref 7–16)
BUN SERPL-MCNC: 19 MG/DL (ref 6–23)
CALCIUM SERPL-MCNC: 9.2 MG/DL (ref 8.6–10.2)
CHLORIDE SERPL-SCNC: 108 MMOL/L (ref 98–107)
CO2 SERPL-SCNC: 21 MMOL/L (ref 22–29)
CREAT SERPL-MCNC: 1.1 MG/DL (ref 0.5–1)
GFR SERPL CREATININE-BSD FRML MDRD: 49 ML/MIN/1.73M2
GLUCOSE SERPL-MCNC: 114 MG/DL (ref 74–99)
POTASSIUM SERPL-SCNC: 4.4 MMOL/L (ref 3.5–5)
SODIUM SERPL-SCNC: 142 MMOL/L (ref 132–146)

## 2023-07-29 LAB
ALBUMIN SERPL-MCNC: 3.6 G/DL (ref 3.5–5.2)
ALP SERPL-CCNC: 56 U/L (ref 35–104)
ALT SERPL-CCNC: 25 U/L (ref 0–32)
ANION GAP SERPL CALCULATED.3IONS-SCNC: 12 MMOL/L (ref 7–16)
AST SERPL-CCNC: 35 U/L (ref 0–31)
BILIRUB SERPL-MCNC: 0.5 MG/DL (ref 0–1.2)
BUN SERPL-MCNC: 18 MG/DL (ref 6–23)
CALCIUM SERPL-MCNC: 9.3 MG/DL (ref 8.6–10.2)
CHLORIDE SERPL-SCNC: 106 MMOL/L (ref 98–107)
CO2 SERPL-SCNC: 21 MMOL/L (ref 22–29)
CREAT SERPL-MCNC: 1.2 MG/DL (ref 0.5–1)
ERYTHROCYTE [DISTWIDTH] IN BLOOD BY AUTOMATED COUNT: 13.5 % (ref 11.5–15)
GFR SERPL CREATININE-BSD FRML MDRD: 46 ML/MIN/1.73M2
GLUCOSE SERPL-MCNC: 100 MG/DL (ref 74–99)
HCT VFR BLD AUTO: 44.3 % (ref 34–48)
HGB BLD-MCNC: 13.3 G/DL (ref 11.5–15.5)
MCH RBC QN AUTO: 30.4 PG (ref 26–35)
MCHC RBC AUTO-ENTMCNC: 30 G/DL (ref 32–34.5)
MCV RBC AUTO: 101.1 FL (ref 80–99.9)
PLATELET # BLD AUTO: 275 K/UL (ref 130–450)
PMV BLD AUTO: 10.3 FL (ref 7–12)
POTASSIUM SERPL-SCNC: 4.8 MMOL/L (ref 3.5–5)
PROT SERPL-MCNC: 6.2 G/DL (ref 6.4–8.3)
RBC # BLD AUTO: 4.38 M/UL (ref 3.5–5.5)
SODIUM SERPL-SCNC: 139 MMOL/L (ref 132–146)
WBC OTHER # BLD: 5.7 K/UL (ref 4.5–11.5)

## 2023-08-04 LAB
ALBUMIN SERPL-MCNC: 3.5 G/DL (ref 3.5–5.2)
ALP SERPL-CCNC: 73 U/L (ref 35–104)
ALT SERPL-CCNC: 10 U/L (ref 0–32)
ANION GAP SERPL CALCULATED.3IONS-SCNC: 11 MMOL/L (ref 7–16)
AST SERPL-CCNC: 16 U/L (ref 0–31)
BILIRUB SERPL-MCNC: 0.6 MG/DL (ref 0–1.2)
BUN SERPL-MCNC: 19 MG/DL (ref 6–23)
CALCIUM SERPL-MCNC: 9.8 MG/DL (ref 8.6–10.2)
CHLORIDE SERPL-SCNC: 109 MMOL/L (ref 98–107)
CO2 SERPL-SCNC: 24 MMOL/L (ref 22–29)
CREAT SERPL-MCNC: 1.3 MG/DL (ref 0.5–1)
ERYTHROCYTE [DISTWIDTH] IN BLOOD BY AUTOMATED COUNT: 12.9 % (ref 11.5–15)
GFR SERPL CREATININE-BSD FRML MDRD: 42 ML/MIN/1.73M2
GLUCOSE SERPL-MCNC: 119 MG/DL (ref 74–99)
HCT VFR BLD AUTO: 41.7 % (ref 34–48)
HGB BLD-MCNC: 13.2 G/DL (ref 11.5–15.5)
MCH RBC QN AUTO: 30.2 PG (ref 26–35)
MCHC RBC AUTO-ENTMCNC: 31.7 G/DL (ref 32–34.5)
MCV RBC AUTO: 95.4 FL (ref 80–99.9)
PLATELET # BLD AUTO: 292 K/UL (ref 130–450)
PMV BLD AUTO: 10.4 FL (ref 7–12)
POTASSIUM SERPL-SCNC: 4.4 MMOL/L (ref 3.5–5)
PROT SERPL-MCNC: 6.3 G/DL (ref 6.4–8.3)
RBC # BLD AUTO: 4.37 M/UL (ref 3.5–5.5)
SODIUM SERPL-SCNC: 144 MMOL/L (ref 132–146)
WBC OTHER # BLD: 8.2 K/UL (ref 4.5–11.5)

## 2023-12-06 ENCOUNTER — APPOINTMENT (OUTPATIENT)
Dept: CT IMAGING | Age: 88
End: 2023-12-06
Payer: MEDICARE

## 2023-12-06 ENCOUNTER — APPOINTMENT (OUTPATIENT)
Dept: GENERAL RADIOLOGY | Age: 88
End: 2023-12-06
Payer: MEDICARE

## 2023-12-06 ENCOUNTER — HOSPITAL ENCOUNTER (INPATIENT)
Age: 88
LOS: 2 days | Discharge: NURSING, REHAB OR LONG-TERM CARE | End: 2023-12-08
Attending: EMERGENCY MEDICINE | Admitting: FAMILY MEDICINE
Payer: MEDICARE

## 2023-12-06 DIAGNOSIS — N17.9 ACUTE KIDNEY INJURY (HCC): Primary | ICD-10-CM

## 2023-12-06 LAB
ALBUMIN SERPL-MCNC: 3.6 G/DL (ref 3.5–5.2)
ALP SERPL-CCNC: 67 U/L (ref 35–104)
ALT SERPL-CCNC: 14 U/L (ref 0–32)
ANION GAP SERPL CALCULATED.3IONS-SCNC: 12 MMOL/L (ref 7–16)
AST SERPL-CCNC: 21 U/L (ref 0–31)
B PARAP IS1001 DNA NPH QL NAA+NON-PROBE: NOT DETECTED
B PERT DNA SPEC QL NAA+PROBE: NOT DETECTED
BACTERIA URNS QL MICRO: ABNORMAL
BASOPHILS # BLD: 0.04 K/UL (ref 0–0.2)
BASOPHILS NFR BLD: 1 % (ref 0–2)
BILIRUB SERPL-MCNC: 0.6 MG/DL (ref 0–1.2)
BILIRUB UR QL STRIP: NEGATIVE
BUN SERPL-MCNC: 60 MG/DL (ref 6–23)
C PNEUM DNA NPH QL NAA+NON-PROBE: NOT DETECTED
CALCIUM SERPL-MCNC: 10 MG/DL (ref 8.6–10.2)
CHLORIDE SERPL-SCNC: 98 MMOL/L (ref 98–107)
CLARITY UR: ABNORMAL
CO2 SERPL-SCNC: 25 MMOL/L (ref 22–29)
COLOR UR: YELLOW
CREAT SERPL-MCNC: 2.5 MG/DL (ref 0.5–1)
EKG ATRIAL RATE: 66 BPM
EKG P AXIS: -10 DEGREES
EKG P-R INTERVAL: 254 MS
EKG Q-T INTERVAL: 480 MS
EKG QRS DURATION: 132 MS
EKG QTC CALCULATION (BAZETT): 503 MS
EKG R AXIS: -27 DEGREES
EKG T AXIS: -12 DEGREES
EKG VENTRICULAR RATE: 66 BPM
EOSINOPHIL # BLD: 0.25 K/UL (ref 0.05–0.5)
EOSINOPHILS RELATIVE PERCENT: 3 % (ref 0–6)
ERYTHROCYTE [DISTWIDTH] IN BLOOD BY AUTOMATED COUNT: 13.2 % (ref 11.5–15)
FLUAV RNA NPH QL NAA+NON-PROBE: NOT DETECTED
FLUBV RNA NPH QL NAA+NON-PROBE: NOT DETECTED
GFR SERPL CREATININE-BSD FRML MDRD: 18 ML/MIN/1.73M2
GLUCOSE BLD-MCNC: 155 MG/DL (ref 74–99)
GLUCOSE SERPL-MCNC: 158 MG/DL (ref 74–99)
GLUCOSE UR STRIP-MCNC: NEGATIVE MG/DL
HADV DNA NPH QL NAA+NON-PROBE: NOT DETECTED
HCOV 229E RNA NPH QL NAA+NON-PROBE: NOT DETECTED
HCOV HKU1 RNA NPH QL NAA+NON-PROBE: NOT DETECTED
HCOV NL63 RNA NPH QL NAA+NON-PROBE: NOT DETECTED
HCOV OC43 RNA NPH QL NAA+NON-PROBE: NOT DETECTED
HCT VFR BLD AUTO: 43.5 % (ref 34–48)
HGB BLD-MCNC: 14.4 G/DL (ref 11.5–15.5)
HGB UR QL STRIP.AUTO: NEGATIVE
HMPV RNA NPH QL NAA+NON-PROBE: NOT DETECTED
HPIV1 RNA NPH QL NAA+NON-PROBE: NOT DETECTED
HPIV2 RNA NPH QL NAA+NON-PROBE: NOT DETECTED
HPIV3 RNA NPH QL NAA+NON-PROBE: NOT DETECTED
HPIV4 RNA NPH QL NAA+NON-PROBE: NOT DETECTED
IMM GRANULOCYTES # BLD AUTO: 0.04 K/UL (ref 0–0.58)
IMM GRANULOCYTES NFR BLD: 1 % (ref 0–5)
INFLUENZA A BY PCR: NOT DETECTED
INFLUENZA B BY PCR: NOT DETECTED
KETONES UR STRIP-MCNC: NEGATIVE MG/DL
LACTATE BLDV-SCNC: 1 MMOL/L (ref 0.5–1.9)
LACTATE BLDV-SCNC: 1.5 MMOL/L (ref 0.5–1.9)
LEUKOCYTE ESTERASE UR QL STRIP: NEGATIVE
LYMPHOCYTES NFR BLD: 1.27 K/UL (ref 1.5–4)
LYMPHOCYTES RELATIVE PERCENT: 16 % (ref 20–42)
M PNEUMO DNA NPH QL NAA+NON-PROBE: NOT DETECTED
MCH RBC QN AUTO: 30.3 PG (ref 26–35)
MCHC RBC AUTO-ENTMCNC: 33.1 G/DL (ref 32–34.5)
MCV RBC AUTO: 91.4 FL (ref 80–99.9)
MONOCYTES NFR BLD: 0.72 K/UL (ref 0.1–0.95)
MONOCYTES NFR BLD: 9 % (ref 2–12)
NEUTROPHILS NFR BLD: 72 % (ref 43–80)
NEUTS SEG NFR BLD: 5.89 K/UL (ref 1.8–7.3)
NITRITE UR QL STRIP: NEGATIVE
PH UR STRIP: 5.5 [PH] (ref 5–9)
PLATELET # BLD AUTO: 306 K/UL (ref 130–450)
PMV BLD AUTO: 9.8 FL (ref 7–12)
POTASSIUM SERPL-SCNC: 3.5 MMOL/L (ref 3.5–5)
PROT SERPL-MCNC: 6.5 G/DL (ref 6.4–8.3)
PROT UR STRIP-MCNC: ABNORMAL MG/DL
RBC # BLD AUTO: 4.76 M/UL (ref 3.5–5.5)
RBC #/AREA URNS HPF: ABNORMAL /HPF
RSV RNA NPH QL NAA+NON-PROBE: NOT DETECTED
RV+EV RNA NPH QL NAA+NON-PROBE: NOT DETECTED
SARS-COV-2 RDRP RESP QL NAA+PROBE: NOT DETECTED
SARS-COV-2 RNA NPH QL NAA+NON-PROBE: NOT DETECTED
SODIUM SERPL-SCNC: 135 MMOL/L (ref 132–146)
SP GR UR STRIP: >1.03 (ref 1–1.03)
SPECIMEN DESCRIPTION: NORMAL
SPECIMEN DESCRIPTION: NORMAL
TROPONIN I SERPL HS-MCNC: 21 NG/L (ref 0–9)
TROPONIN I SERPL HS-MCNC: 23 NG/L (ref 0–9)
UROBILINOGEN UR STRIP-ACNC: 0.2 EU/DL (ref 0–1)
WBC #/AREA URNS HPF: ABNORMAL /HPF
WBC OTHER # BLD: 8.2 K/UL (ref 4.5–11.5)

## 2023-12-06 PROCEDURE — 87040 BLOOD CULTURE FOR BACTERIA: CPT

## 2023-12-06 PROCEDURE — 73562 X-RAY EXAM OF KNEE 3: CPT

## 2023-12-06 PROCEDURE — 87154 CUL TYP ID BLD PTHGN 6+ TRGT: CPT

## 2023-12-06 PROCEDURE — 0202U NFCT DS 22 TRGT SARS-COV-2: CPT

## 2023-12-06 PROCEDURE — 87086 URINE CULTURE/COLONY COUNT: CPT

## 2023-12-06 PROCEDURE — 87635 SARS-COV-2 COVID-19 AMP PRB: CPT

## 2023-12-06 PROCEDURE — 2060000000 HC ICU INTERMEDIATE R&B

## 2023-12-06 PROCEDURE — 96365 THER/PROPH/DIAG IV INF INIT: CPT

## 2023-12-06 PROCEDURE — 6370000000 HC RX 637 (ALT 250 FOR IP): Performed by: NURSE PRACTITIONER

## 2023-12-06 PROCEDURE — 83036 HEMOGLOBIN GLYCOSYLATED A1C: CPT

## 2023-12-06 PROCEDURE — 93005 ELECTROCARDIOGRAM TRACING: CPT

## 2023-12-06 PROCEDURE — 93010 ELECTROCARDIOGRAM REPORT: CPT | Performed by: INTERNAL MEDICINE

## 2023-12-06 PROCEDURE — 80053 COMPREHEN METABOLIC PANEL: CPT

## 2023-12-06 PROCEDURE — 84484 ASSAY OF TROPONIN QUANT: CPT

## 2023-12-06 PROCEDURE — 85025 COMPLETE CBC W/AUTO DIFF WBC: CPT

## 2023-12-06 PROCEDURE — 2580000003 HC RX 258

## 2023-12-06 PROCEDURE — 87077 CULTURE AEROBIC IDENTIFY: CPT

## 2023-12-06 PROCEDURE — 82962 GLUCOSE BLOOD TEST: CPT

## 2023-12-06 PROCEDURE — 70450 CT HEAD/BRAIN W/O DYE: CPT

## 2023-12-06 PROCEDURE — 73502 X-RAY EXAM HIP UNI 2-3 VIEWS: CPT

## 2023-12-06 PROCEDURE — 81001 URINALYSIS AUTO W/SCOPE: CPT

## 2023-12-06 PROCEDURE — 2580000003 HC RX 258: Performed by: NURSE PRACTITIONER

## 2023-12-06 PROCEDURE — 83605 ASSAY OF LACTIC ACID: CPT

## 2023-12-06 PROCEDURE — 96366 THER/PROPH/DIAG IV INF ADDON: CPT

## 2023-12-06 PROCEDURE — 87502 INFLUENZA DNA AMP PROBE: CPT

## 2023-12-06 PROCEDURE — 71045 X-RAY EXAM CHEST 1 VIEW: CPT

## 2023-12-06 PROCEDURE — 6360000002 HC RX W HCPCS: Performed by: NURSE PRACTITIONER

## 2023-12-06 PROCEDURE — 99285 EMERGENCY DEPT VISIT HI MDM: CPT

## 2023-12-06 RX ORDER — METOPROLOL SUCCINATE 25 MG/1
25 TABLET, EXTENDED RELEASE ORAL DAILY
Status: DISCONTINUED | OUTPATIENT
Start: 2023-12-06 | End: 2023-12-08 | Stop reason: HOSPADM

## 2023-12-06 RX ORDER — INSULIN LISPRO 100 [IU]/ML
0-4 INJECTION, SOLUTION INTRAVENOUS; SUBCUTANEOUS
Status: DISCONTINUED | OUTPATIENT
Start: 2023-12-07 | End: 2023-12-08 | Stop reason: HOSPADM

## 2023-12-06 RX ORDER — SODIUM CHLORIDE 0.9 % (FLUSH) 0.9 %
5-40 SYRINGE (ML) INJECTION EVERY 12 HOURS SCHEDULED
Status: DISCONTINUED | OUTPATIENT
Start: 2023-12-06 | End: 2023-12-08 | Stop reason: HOSPADM

## 2023-12-06 RX ORDER — SODIUM CHLORIDE 9 MG/ML
INJECTION, SOLUTION INTRAVENOUS CONTINUOUS
Status: DISCONTINUED | OUTPATIENT
Start: 2023-12-06 | End: 2023-12-08 | Stop reason: HOSPADM

## 2023-12-06 RX ORDER — DONEPEZIL HYDROCHLORIDE 5 MG/1
5 TABLET, FILM COATED ORAL NIGHTLY
Status: DISCONTINUED | OUTPATIENT
Start: 2023-12-06 | End: 2023-12-08 | Stop reason: HOSPADM

## 2023-12-06 RX ORDER — FOLIC ACID 1 MG/1
1 TABLET ORAL DAILY
Status: DISCONTINUED | OUTPATIENT
Start: 2023-12-07 | End: 2023-12-08 | Stop reason: HOSPADM

## 2023-12-06 RX ORDER — ACETAMINOPHEN 325 MG/1
650 TABLET ORAL EVERY 6 HOURS PRN
Status: DISCONTINUED | OUTPATIENT
Start: 2023-12-06 | End: 2023-12-08 | Stop reason: HOSPADM

## 2023-12-06 RX ORDER — 0.9 % SODIUM CHLORIDE 0.9 %
1000 INTRAVENOUS SOLUTION INTRAVENOUS ONCE
Status: COMPLETED | OUTPATIENT
Start: 2023-12-06 | End: 2023-12-06

## 2023-12-06 RX ORDER — SODIUM CHLORIDE 9 MG/ML
INJECTION, SOLUTION INTRAVENOUS PRN
Status: DISCONTINUED | OUTPATIENT
Start: 2023-12-06 | End: 2023-12-08 | Stop reason: HOSPADM

## 2023-12-06 RX ORDER — LOSARTAN POTASSIUM 50 MG/1
50 TABLET ORAL DAILY
Status: DISCONTINUED | OUTPATIENT
Start: 2023-12-06 | End: 2023-12-08 | Stop reason: HOSPADM

## 2023-12-06 RX ORDER — SODIUM CHLORIDE 0.9 % (FLUSH) 0.9 %
5-40 SYRINGE (ML) INJECTION PRN
Status: DISCONTINUED | OUTPATIENT
Start: 2023-12-06 | End: 2023-12-08 | Stop reason: HOSPADM

## 2023-12-06 RX ORDER — INSULIN LISPRO 100 [IU]/ML
0-4 INJECTION, SOLUTION INTRAVENOUS; SUBCUTANEOUS NIGHTLY
Status: DISCONTINUED | OUTPATIENT
Start: 2023-12-06 | End: 2023-12-08 | Stop reason: HOSPADM

## 2023-12-06 RX ORDER — PRAVASTATIN SODIUM 20 MG
40 TABLET ORAL DAILY
Status: DISCONTINUED | OUTPATIENT
Start: 2023-12-07 | End: 2023-12-08 | Stop reason: HOSPADM

## 2023-12-06 RX ORDER — CLOPIDOGREL BISULFATE 75 MG/1
75 TABLET ORAL DAILY
Status: DISCONTINUED | OUTPATIENT
Start: 2023-12-06 | End: 2023-12-08 | Stop reason: HOSPADM

## 2023-12-06 RX ORDER — AMLODIPINE BESYLATE 5 MG/1
2.5 TABLET ORAL DAILY
Status: DISCONTINUED | OUTPATIENT
Start: 2023-12-07 | End: 2023-12-08 | Stop reason: HOSPADM

## 2023-12-06 RX ORDER — FLUOXETINE HYDROCHLORIDE 20 MG/1
40 CAPSULE ORAL DAILY
Status: DISCONTINUED | OUTPATIENT
Start: 2023-12-07 | End: 2023-12-08 | Stop reason: HOSPADM

## 2023-12-06 RX ORDER — ENOXAPARIN SODIUM 100 MG/ML
30 INJECTION SUBCUTANEOUS DAILY
Status: DISCONTINUED | OUTPATIENT
Start: 2023-12-06 | End: 2023-12-08 | Stop reason: HOSPADM

## 2023-12-06 RX ORDER — ONDANSETRON 2 MG/ML
4 INJECTION INTRAMUSCULAR; INTRAVENOUS EVERY 6 HOURS PRN
Status: DISCONTINUED | OUTPATIENT
Start: 2023-12-06 | End: 2023-12-06 | Stop reason: ALTCHOICE

## 2023-12-06 RX ORDER — ACETAMINOPHEN 650 MG/1
650 SUPPOSITORY RECTAL EVERY 6 HOURS PRN
Status: DISCONTINUED | OUTPATIENT
Start: 2023-12-06 | End: 2023-12-08 | Stop reason: HOSPADM

## 2023-12-06 RX ORDER — MONTELUKAST SODIUM 4 MG/1
1 TABLET, CHEWABLE ORAL 2 TIMES DAILY
COMMUNITY

## 2023-12-06 RX ORDER — DEXTROSE MONOHYDRATE 100 MG/ML
INJECTION, SOLUTION INTRAVENOUS CONTINUOUS PRN
Status: DISCONTINUED | OUTPATIENT
Start: 2023-12-06 | End: 2023-12-08 | Stop reason: HOSPADM

## 2023-12-06 RX ORDER — OXYBUTYNIN CHLORIDE 5 MG/1
5 TABLET ORAL 2 TIMES DAILY
Status: DISCONTINUED | OUTPATIENT
Start: 2023-12-06 | End: 2023-12-08 | Stop reason: HOSPADM

## 2023-12-06 RX ORDER — GLIPIZIDE 5 MG/1
5 TABLET ORAL
COMMUNITY

## 2023-12-06 RX ORDER — HYDROCHLOROTHIAZIDE 12.5 MG/1
12.5 CAPSULE, GELATIN COATED ORAL DAILY
COMMUNITY

## 2023-12-06 RX ORDER — ONDANSETRON 4 MG/1
4 TABLET, ORALLY DISINTEGRATING ORAL EVERY 8 HOURS PRN
Status: DISCONTINUED | OUTPATIENT
Start: 2023-12-06 | End: 2023-12-06 | Stop reason: ALTCHOICE

## 2023-12-06 RX ORDER — ASPIRIN 81 MG/1
81 TABLET ORAL DAILY
Status: DISCONTINUED | OUTPATIENT
Start: 2023-12-07 | End: 2023-12-08 | Stop reason: HOSPADM

## 2023-12-06 RX ADMIN — SODIUM CHLORIDE 1000 ML: 9 INJECTION, SOLUTION INTRAVENOUS at 15:09

## 2023-12-06 RX ADMIN — DONEPEZIL HYDROCHLORIDE 5 MG: 5 TABLET, FILM COATED ORAL at 21:57

## 2023-12-06 RX ADMIN — SODIUM CHLORIDE: 9 INJECTION, SOLUTION INTRAVENOUS at 22:09

## 2023-12-06 RX ADMIN — METOPROLOL SUCCINATE 25 MG: 25 TABLET, EXTENDED RELEASE ORAL at 21:57

## 2023-12-06 RX ADMIN — SODIUM CHLORIDE, PRESERVATIVE FREE 10 ML: 5 INJECTION INTRAVENOUS at 22:10

## 2023-12-06 RX ADMIN — OXYBUTYNIN CHLORIDE 5 MG: 5 TABLET ORAL at 21:57

## 2023-12-06 RX ADMIN — ENOXAPARIN SODIUM 30 MG: 100 INJECTION SUBCUTANEOUS at 21:57

## 2023-12-06 RX ADMIN — CLOPIDOGREL BISULFATE 75 MG: 75 TABLET ORAL at 22:13

## 2023-12-06 RX ADMIN — SODIUM CHLORIDE 1000 ML: 9 INJECTION, SOLUTION INTRAVENOUS at 12:29

## 2023-12-07 LAB
ALBUMIN SERPL-MCNC: 3.3 G/DL (ref 3.5–5.2)
ANION GAP SERPL CALCULATED.3IONS-SCNC: 10 MMOL/L (ref 7–16)
BASOPHILS # BLD: 0.03 K/UL (ref 0–0.2)
BASOPHILS NFR BLD: 0 % (ref 0–2)
BUN SERPL-MCNC: 45 MG/DL (ref 6–23)
CALCIUM SERPL-MCNC: 9.2 MG/DL (ref 8.6–10.2)
CHLORIDE SERPL-SCNC: 108 MMOL/L (ref 98–107)
CO2 SERPL-SCNC: 23 MMOL/L (ref 22–29)
CREAT SERPL-MCNC: 1.7 MG/DL (ref 0.5–1)
CREAT UR-MCNC: 120.1 MG/DL (ref 29–226)
CRP SERPL HS-MCNC: 8 MG/L (ref 0–5)
EOSINOPHIL # BLD: 0.45 K/UL (ref 0.05–0.5)
EOSINOPHILS RELATIVE PERCENT: 6 % (ref 0–6)
ERYTHROCYTE [DISTWIDTH] IN BLOOD BY AUTOMATED COUNT: 13.2 % (ref 11.5–15)
ERYTHROCYTE [SEDIMENTATION RATE] IN BLOOD BY WESTERGREN METHOD: 30 MM/HR (ref 0–20)
GFR SERPL CREATININE-BSD FRML MDRD: 29 ML/MIN/1.73M2
GLUCOSE BLD-MCNC: 119 MG/DL (ref 74–99)
GLUCOSE BLD-MCNC: 123 MG/DL (ref 74–99)
GLUCOSE BLD-MCNC: 132 MG/DL (ref 74–99)
GLUCOSE BLD-MCNC: 159 MG/DL (ref 74–99)
GLUCOSE SERPL-MCNC: 119 MG/DL (ref 74–99)
HBA1C MFR BLD: 5.8 % (ref 4–5.6)
HCT VFR BLD AUTO: 38.6 % (ref 34–48)
HGB BLD-MCNC: 12.5 G/DL (ref 11.5–15.5)
IMM GRANULOCYTES # BLD AUTO: 0.03 K/UL (ref 0–0.58)
IMM GRANULOCYTES NFR BLD: 0 % (ref 0–5)
LYMPHOCYTES NFR BLD: 1.47 K/UL (ref 1.5–4)
LYMPHOCYTES RELATIVE PERCENT: 20 % (ref 20–42)
MCH RBC QN AUTO: 29.8 PG (ref 26–35)
MCHC RBC AUTO-ENTMCNC: 32.4 G/DL (ref 32–34.5)
MCV RBC AUTO: 92.1 FL (ref 80–99.9)
MONOCYTES NFR BLD: 0.78 K/UL (ref 0.1–0.95)
MONOCYTES NFR BLD: 10 % (ref 2–12)
NEUTROPHILS NFR BLD: 63 % (ref 43–80)
NEUTS SEG NFR BLD: 4.74 K/UL (ref 1.8–7.3)
PHOSPHATE SERPL-MCNC: 2.9 MG/DL (ref 2.5–4.5)
PLATELET # BLD AUTO: 262 K/UL (ref 130–450)
PMV BLD AUTO: 9.6 FL (ref 7–12)
POTASSIUM SERPL-SCNC: 3.4 MMOL/L (ref 3.5–5)
RBC # BLD AUTO: 4.19 M/UL (ref 3.5–5.5)
SODIUM SERPL-SCNC: 141 MMOL/L (ref 132–146)
SODIUM UR-SCNC: 103 MMOL/L
UUN UR-MCNC: 962 MG/DL (ref 800–1666)
WBC OTHER # BLD: 7.5 K/UL (ref 4.5–11.5)

## 2023-12-07 PROCEDURE — 6370000000 HC RX 637 (ALT 250 FOR IP): Performed by: NURSE PRACTITIONER

## 2023-12-07 PROCEDURE — 85025 COMPLETE CBC W/AUTO DIFF WBC: CPT

## 2023-12-07 PROCEDURE — 80069 RENAL FUNCTION PANEL: CPT

## 2023-12-07 PROCEDURE — 97165 OT EVAL LOW COMPLEX 30 MIN: CPT

## 2023-12-07 PROCEDURE — 87040 BLOOD CULTURE FOR BACTERIA: CPT

## 2023-12-07 PROCEDURE — 2580000003 HC RX 258: Performed by: NURSE PRACTITIONER

## 2023-12-07 PROCEDURE — 84540 ASSAY OF URINE/UREA-N: CPT

## 2023-12-07 PROCEDURE — 82962 GLUCOSE BLOOD TEST: CPT

## 2023-12-07 PROCEDURE — 84300 ASSAY OF URINE SODIUM: CPT

## 2023-12-07 PROCEDURE — 2060000000 HC ICU INTERMEDIATE R&B

## 2023-12-07 PROCEDURE — 6360000002 HC RX W HCPCS: Performed by: NURSE PRACTITIONER

## 2023-12-07 PROCEDURE — 82570 ASSAY OF URINE CREATININE: CPT

## 2023-12-07 PROCEDURE — 6360000002 HC RX W HCPCS: Performed by: FAMILY MEDICINE

## 2023-12-07 PROCEDURE — 85652 RBC SED RATE AUTOMATED: CPT

## 2023-12-07 PROCEDURE — 97161 PT EVAL LOW COMPLEX 20 MIN: CPT

## 2023-12-07 PROCEDURE — 86140 C-REACTIVE PROTEIN: CPT

## 2023-12-07 PROCEDURE — 36415 COLL VENOUS BLD VENIPUNCTURE: CPT

## 2023-12-07 RX ORDER — HYDRALAZINE HYDROCHLORIDE 20 MG/ML
10 INJECTION INTRAMUSCULAR; INTRAVENOUS EVERY 6 HOURS PRN
Status: DISCONTINUED | OUTPATIENT
Start: 2023-12-07 | End: 2023-12-08 | Stop reason: HOSPADM

## 2023-12-07 RX ADMIN — SODIUM CHLORIDE, PRESERVATIVE FREE 10 ML: 5 INJECTION INTRAVENOUS at 22:16

## 2023-12-07 RX ADMIN — FLUOXETINE HYDROCHLORIDE 40 MG: 20 CAPSULE ORAL at 08:37

## 2023-12-07 RX ADMIN — HYDRALAZINE HYDROCHLORIDE 10 MG: 20 INJECTION, SOLUTION INTRAMUSCULAR; INTRAVENOUS at 14:40

## 2023-12-07 RX ADMIN — ASPIRIN 81 MG: 81 TABLET, COATED ORAL at 08:37

## 2023-12-07 RX ADMIN — DONEPEZIL HYDROCHLORIDE 5 MG: 5 TABLET, FILM COATED ORAL at 22:18

## 2023-12-07 RX ADMIN — PRAVASTATIN SODIUM 40 MG: 20 TABLET ORAL at 08:37

## 2023-12-07 RX ADMIN — CLOPIDOGREL BISULFATE 75 MG: 75 TABLET ORAL at 08:37

## 2023-12-07 RX ADMIN — OXYBUTYNIN CHLORIDE 5 MG: 5 TABLET ORAL at 08:37

## 2023-12-07 RX ADMIN — METOPROLOL SUCCINATE 25 MG: 25 TABLET, EXTENDED RELEASE ORAL at 08:37

## 2023-12-07 RX ADMIN — ENOXAPARIN SODIUM 30 MG: 100 INJECTION SUBCUTANEOUS at 08:37

## 2023-12-07 RX ADMIN — OXYBUTYNIN CHLORIDE 5 MG: 5 TABLET ORAL at 22:17

## 2023-12-07 RX ADMIN — SODIUM CHLORIDE: 9 INJECTION, SOLUTION INTRAVENOUS at 08:38

## 2023-12-07 RX ADMIN — FOLIC ACID 1 MG: 1 TABLET ORAL at 08:37

## 2023-12-07 RX ADMIN — AMLODIPINE BESYLATE 2.5 MG: 5 TABLET ORAL at 08:37

## 2023-12-07 NOTE — PROGRESS NOTES
4 Eyes Skin Assessment     NAME:  Karissa Browne  YOB: 1935  MEDICAL RECORD NUMBER:  [id number]    The patient is being assessed for  Admission    I agree that at least one RN has performed a thorough Head to Toe Skin Assessment on the patient. ALL assessment sites listed below have been assessed. Areas assessed by both nurses:    Head, Face, Ears, Shoulders, Back, Chest, Arms, Elbows, Hands, Sacrum. Buttock, Coccyx, Ischium, Legs. Feet and Heels, and Under Medical Devices         Does the Patient have a Wound?  No noted wound(s)       Randy Prevention initiated by RN: Yes  Wound Care Orders initiated by RN: No    Pressure Injury (Stage 3,4, Unstageable, DTI, NWPT, and Complex wounds) if present, place Wound referral order by RN under : No    New Ostomies, if present place, Ostomy referral order under : No     Nurse 1 eSignature: Electronically signed by Ewelina Sarabia RN on 12/6/23 at 11:13 PM EST    **SHARE this note so that the co-signing nurse can place an eSignature**    Nurse 2 eSignature: Electronically signed by Jumana Argueta RN on 12/7/23 at 1:25 AM EST

## 2023-12-07 NOTE — PROGRESS NOTES
Occupational Therapy    OCCUPATIONAL THERAPY INITIAL EVALUATION    MACHELLE Valladares [address]   [address] [place], [place]         [id number]                                                  Patient Name: Grady Gerardo    MRN: [id number]    : 1935    Room: [address] [place] [address]-A      Evaluating OT: Liz Ann OTR/L   [id number]      Referring REGINA Muñoz CNP     Specific Provider Orders/Date:OT eval and treat 2023      Diagnosis:  NICK (acute kidney injury) ([address]) [N17.9]  Acute kidney injury ([address]) [N17.9]  Per ER note:  pt states \" legs gave up\" and fell on the left leg x 2 days ago, denies hit head, LOC     Pertinent Medical History: stroke, DM, stenosis      Precautions:  Fall Risk,      Assessment of current deficits    [x] Functional mobility  [x]ADLs  [x] Strength               []Cognition    [x] Functional transfers   [x] IADLs         [x] Safety Awareness   [x]Endurance    [] Fine Coordination              [x] Balance      [] Vision/perception   []Sensation     []Gross Motor Coordination  [] ROM  [] Delirium                   [] Motor Control     OT PLAN OF CARE   OT POC based on physician orders, patient diagnosis and results of clinical assessment    Frequency/Duration  2-4 days/wk for 2 weeks PRN   Specific OT Treatment Interventions to include:   ADL retraining/adapted techniques and AE recommendations to increase functional independence within precautions                    Energy conservation techniques to improve tolerance for selfcare routine   Functional transfer/mobility training/DME recommendations for increased independence, safety and fall prevention         Patient/family education to increase safety and functional independence             Environmental modifications for safe mobility and completion of ADLs                             Therapeutic activity to improve functional performance during ADLs. Therapeutic exercise to improve tolerance and functional strength for ADLs    Balance retraining/tolerance tasks for facilitation of postural control with dynamic challenges during ADLs . Positioning to improve functional independence      Recommended Adaptive Equipment: TBD     Home Living: Pt lives with , 2 story with steps to enter. 1st floor set-up    Bathroom setup: sponge bathing    Equipment owned: walker, wheelchair     Prior Level of Function: recently increase assist  with ADLs , and  with IADLs; and using wheelchair since fall     Pain Level: L LE ;   Cognition: A&O: id;ed place and month/year   Memory:  fair    Sequencing:  fair    Problem solving:  fair    Judgement/safety:  fair      Functional Assessment:  AM-PAC Daily Activity Raw Score: 14/24   Initial Eval Status  Date: 12/7/2023 Treatment Status  Date: STGs = LTGs  Time frame: 10-14 days   Feeding Set-up   Independent    Grooming Min A, seated   Supervision    UB Dressing Min A   Supervision    LB Dressing Max A   Min  A   Bathing Mod A   Min  A   Toileting Max A   Min A    Bed Mobility  Mod A  Supine to sit   Min A  Sit-supine  SBA    Functional Transfers Mod A  Sit-stand from bed   CGA/SBA    Functional Mobility Patient stood next to bed only this session - wanting to return to bed   Fatigue, decrease balance and L LE pain   CGA/SBA  with good tolerance    Balance Sitting:     Static:  SBA- EOB     Dynamic:Min A   Standing: Mod A   SBA/supervision    Activity Tolerance No SOB observed this session   Good  with ADL activity    Visual/  Perceptual Glasses: none by bedside                 Hand Dominance right   AROM (PROM)/strength    RUE  WFL   LUE Patient reports stroke affected her L side   AROM present throughout   Able to use L UE to support self sitting EOB    strength      Hearing: WFL   Sensation:  No c/o numbness or tingling   Tone: WFL   Edema: none observed     Comments: Upon arrival patient lying in bed .   At

## 2023-12-07 NOTE — H&P
[facility]  History and Physical      CHIEF COMPLAINT:    Chief Complaint   Patient presents with    Fatigue      c/o weakness and unable to ambulate at home; pt states \" legs gave up\" and fell on the left leg x 2 days ago, denies hit head, LOC,      Altered Mental Status     EMS states pt increased AMS per family, nauseas, possible UTI, hx of stroke         Patient of Fabián Ramos MD presents with:  NICK (acute kidney injury) ([address])    History of Present Illness:   Patient is an 81-year-old female with past medical history of DM, HLD, CVA presents emergency room for fatigue and altered mental status. Patient has been unable to ambulate at home and became so weak causing her legs to give out and her falling. Patient underwent a CT head which was negative acute. Chest x-ray revealed possible left pleural effusion. X-rays of her knee and hip were both negative for any acute fractures. Labs revealed an elevated BUN/creatinine 60/2.5. Patient is admitted to intermediate telemetry unit for further workup and treatment. REVIEW OF SYSTEMS:  Pertinent negatives are above in HPI. 10 point ROS otherwise negative.       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    Stroke [facility])          Past Surgical History:   Procedure Laterality Date     SECTION      CHOLECYSTECTOMY      COLONOSCOPY      EYE SURGERY      bilateral w/ implants    HYSTERECTOMY (CERVIX STATUS UNKNOWN)         Medications Prior to Admission:    Medications Prior to Admission: hydroCHLOROthiazide (MICROZIDE) 12.5 MG capsule, Take 1 capsule by mouth daily  glipiZIDE (GLUCOTROL) 5 MG tablet, Take 1 tablet by mouth 2 times daily (before meals)  colestipol (COLESTID) 1 g tablet, Take 1 tablet by teams, face to face encounter with patient, providing counseling/education to patient/family.      Electronically signed by Tanmay Polanco DO on 12/7/2023 at 2:52 PM

## 2023-12-07 NOTE — ACP (ADVANCE CARE PLANNING)
Advance Care Planning   Healthcare Decision Maker:    Primary Decision Maker: Judith Connors - [phone]    Click here to complete Healthcare Decision Makers including selection of the Healthcare Decision Maker Relationship (ie \"Primary\").

## 2023-12-07 NOTE — PROGRESS NOTES
Physical Therapy  Facility/Department: [address] INTERNAL MEDICINE 2  Physical Therapy Initial Assessment    Name: Cleveland Primrose  : 1935  MRN: [id number]  Date of Service: 2023        Patient Diagnosis(es): The encounter diagnosis was Acute kidney injury [facility]). 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 Past Surgical History:  has a past surgical history that includes  section; eye surgery; Colonoscopy; Cholecystectomy; and Hysterectomy. Referring provider:  Martha Perez DO    PT Order:  PT eval and treat     Evaluating PT:  Jairon Borrego PT, DPT PT [id number]    Room #:  7847/1375-S  Diagnosis:  NICK (acute kidney injury) ([address]) [N17.9]  Acute kidney injury ([address]) [N17.9]  Precautions:  fall risk  Equipment Needs:  none. Pt reported owing a walker and w/c.    SUBJECTIVE:    Pt lives with her  in a 2 story home with 2 stairs to enter. Bed and bath is on first floor. Pt reported she does not leave the home and she has not ambulated since she has fallen . Pt reported she was using a walker for ambulation but lately her  has been assisting her into a w/c. OBJECTIVE:   Initial Evaluation  Date:  Treatment Short Term/ Long Term   Goals   Was pt agreeable to Eval/treatment? yes     Does pt have pain? Left LE when standing     Bed Mobility  Rolling: NT  Supine to sit: Mod A  Sit to supine: Min A  Scooting: Min A to sitting EOB  SBA   Transfers Sit to stand: Mod A  Stand to sit: Mod A  Stand pivot: NT  SBA   Ambulation    Pt unable to take steps.   5 feet with w/w Min A    Stair negotiation: ascended and descended  NT      ROM BLE:  WFL     Strength Right LE:  grossly 4/5  Left LE:  grossly 2/5  Increase LE strength by 1/2 mm grade   Balance Sitting EOB:  SBA  Static standing: Max A  Sitting EOB:  supervision  Dynamic Standing:  Min A   AM-PAC 6 Clicks 05/71       Pt is A & O x 3  Sensation:  Pt denies numbness and tingling to extremities    Patient education  Pt educated on PT objectives during eval and while in the hospital, hand placement during transfers, posture when standing. Patient response to education:   Pt verbalized understanding Pt demonstrated skill Pt requires further education in this area   yes With cueing yes     ASSESSMENT:    Conditions Requiring Skilled Therapeutic Intervention:    [x]Decreased strength     []Decreased ROM  [x]Decreased functional mobility  [x]Decreased balance   [x]Decreased endurance   [x]Decreased posture  []Decreased sensation  []Decreased coordination   []Decreased vision  []Decreased safety awareness   [x]Increased pain       Comments:  Pt found in bed. No report of dizziness once sitting EOB. Cueing required for hand placement during transfers. Pt reported increased left LE pain once standing. Standing tolerance only a couple seconds. Pt stood twice during eval.    At end of eval, pt left in bed with call light in reach and bed alarm on.      Pt's/ family goals   1. None stated    Prognosis is good for reaching above PT goals. Patient and or family understand(s) diagnosis, prognosis, and plan of care.   yes    PHYSICAL THERAPY PLAN OF CARE:    PT POC is established based on physician order and patient diagnosis     Diagnosis:  NICK (acute kidney injury) ([address]) [N17.9]  Acute kidney injury ([address]) [N17.9]    Current Treatment Recommendations:     [x] Strengthening to improve independence with functional mobility   [] ROM to improve independence with functional mobility   [x] Balance Training to improve static/dynamic balance and to reduce fall risk  [x] Endurance Training to improve activity tolerance during functional mobility   [x] Transfer Training to improve safety and independence with all functional transfers   [x] Gait

## 2023-12-07 NOTE — CARE COORDINATION
Introduced my self and provided explanation of CM role to patient and her  at bedside. Patient is awake, alert, and aware of current diagnosis and treatment plan including iv fluids, serial labs, pt/ot evals, discharge planning  She voices she resides at home with her spouse and completes her adl's with his near complete assistance. She has hx of snf stay at [facility] at [facility] followed by [address]. States that episode was in May or June of 2023. She expresses desire to return to [facility] following this hospital stay with an alternate choice from provider list of [facility].  Referral is called to [address] at [facility]. Will await her review and response regarding bed availability/acceptance. Patient will require a prior auth to enter any accepting facility. Patient will need followed and assisted with discharge planning as necessary. MATTHEW Perez RN  [facility] Case Management  [phone]      Per [address] at [facility], facility s accepted and will initiate auth request for SNF stay today  MATTHEW Perez RN  [facility] Case Management  [phone]

## 2023-12-08 VITALS
BODY MASS INDEX: 30.12 KG/M2 | WEIGHT: 187.39 LBS | HEART RATE: 74 BPM | SYSTOLIC BLOOD PRESSURE: 196 MMHG | RESPIRATION RATE: 20 BRPM | DIASTOLIC BLOOD PRESSURE: 70 MMHG | HEIGHT: 66 IN | TEMPERATURE: 98.7 F | OXYGEN SATURATION: 99 %

## 2023-12-08 LAB
ALBUMIN SERPL-MCNC: 3.3 G/DL (ref 3.5–5.2)
ANION GAP SERPL CALCULATED.3IONS-SCNC: 13 MMOL/L (ref 7–16)
ANION GAP SERPL CALCULATED.3IONS-SCNC: 13 MMOL/L (ref 7–16)
BASOPHILS # BLD: 0.03 K/UL (ref 0–0.2)
BASOPHILS NFR BLD: 0 % (ref 0–2)
BUN SERPL-MCNC: 29 MG/DL (ref 6–23)
BUN SERPL-MCNC: 29 MG/DL (ref 6–23)
CALCIUM SERPL-MCNC: 9.1 MG/DL (ref 8.6–10.2)
CALCIUM SERPL-MCNC: 9.3 MG/DL (ref 8.6–10.2)
CHLORIDE SERPL-SCNC: 106 MMOL/L (ref 98–107)
CHLORIDE SERPL-SCNC: 108 MMOL/L (ref 98–107)
CO2 SERPL-SCNC: 20 MMOL/L (ref 22–29)
CO2 SERPL-SCNC: 21 MMOL/L (ref 22–29)
CREAT SERPL-MCNC: 1.3 MG/DL (ref 0.5–1)
CREAT SERPL-MCNC: 1.3 MG/DL (ref 0.5–1)
EOSINOPHIL # BLD: 0.4 K/UL (ref 0.05–0.5)
EOSINOPHILS RELATIVE PERCENT: 5 % (ref 0–6)
ERYTHROCYTE [DISTWIDTH] IN BLOOD BY AUTOMATED COUNT: 13 % (ref 11.5–15)
GFR SERPL CREATININE-BSD FRML MDRD: 41 ML/MIN/1.73M2
GFR SERPL CREATININE-BSD FRML MDRD: 41 ML/MIN/1.73M2
GLUCOSE BLD-MCNC: 115 MG/DL (ref 74–99)
GLUCOSE BLD-MCNC: 122 MG/DL (ref 74–99)
GLUCOSE BLD-MCNC: 137 MG/DL (ref 74–99)
GLUCOSE SERPL-MCNC: 141 MG/DL (ref 74–99)
GLUCOSE SERPL-MCNC: 142 MG/DL (ref 74–99)
HCT VFR BLD AUTO: 39.2 % (ref 34–48)
HGB BLD-MCNC: 13.2 G/DL (ref 11.5–15.5)
IMM GRANULOCYTES # BLD AUTO: 0.04 K/UL (ref 0–0.58)
IMM GRANULOCYTES NFR BLD: 1 % (ref 0–5)
LYMPHOCYTES NFR BLD: 1.29 K/UL (ref 1.5–4)
LYMPHOCYTES RELATIVE PERCENT: 15 % (ref 20–42)
MAGNESIUM SERPL-MCNC: 1.7 MG/DL (ref 1.6–2.6)
MCH RBC QN AUTO: 30.8 PG (ref 26–35)
MCHC RBC AUTO-ENTMCNC: 33.7 G/DL (ref 32–34.5)
MCV RBC AUTO: 91.4 FL (ref 80–99.9)
MONOCYTES NFR BLD: 0.75 K/UL (ref 0.1–0.95)
MONOCYTES NFR BLD: 9 % (ref 2–12)
NEUTROPHILS NFR BLD: 70 % (ref 43–80)
NEUTS SEG NFR BLD: 5.94 K/UL (ref 1.8–7.3)
PHOSPHATE SERPL-MCNC: 2 MG/DL (ref 2.5–4.5)
PLATELET # BLD AUTO: 278 K/UL (ref 130–450)
PMV BLD AUTO: 10 FL (ref 7–12)
POTASSIUM SERPL-SCNC: 3.5 MMOL/L (ref 3.5–5)
POTASSIUM SERPL-SCNC: 3.5 MMOL/L (ref 3.5–5)
RBC # BLD AUTO: 4.29 M/UL (ref 3.5–5.5)
SODIUM SERPL-SCNC: 140 MMOL/L (ref 132–146)
SODIUM SERPL-SCNC: 141 MMOL/L (ref 132–146)
WBC OTHER # BLD: 8.5 K/UL (ref 4.5–11.5)

## 2023-12-08 PROCEDURE — 2580000003 HC RX 258: Performed by: NURSE PRACTITIONER

## 2023-12-08 PROCEDURE — 80069 RENAL FUNCTION PANEL: CPT

## 2023-12-08 PROCEDURE — 6370000000 HC RX 637 (ALT 250 FOR IP): Performed by: NURSE PRACTITIONER

## 2023-12-08 PROCEDURE — 80048 BASIC METABOLIC PNL TOTAL CA: CPT

## 2023-12-08 PROCEDURE — 82962 GLUCOSE BLOOD TEST: CPT

## 2023-12-08 PROCEDURE — 2580000003 HC RX 258

## 2023-12-08 PROCEDURE — 83735 ASSAY OF MAGNESIUM: CPT

## 2023-12-08 PROCEDURE — 6360000002 HC RX W HCPCS: Performed by: FAMILY MEDICINE

## 2023-12-08 PROCEDURE — 85025 COMPLETE CBC W/AUTO DIFF WBC: CPT

## 2023-12-08 PROCEDURE — 6360000002 HC RX W HCPCS: Performed by: NURSE PRACTITIONER

## 2023-12-08 RX ORDER — CIPROFLOXACIN 500 MG/1
500 TABLET, FILM COATED ORAL 2 TIMES DAILY
Qty: 14 TABLET | Refills: 0 | DISCHARGE
Start: 2023-12-08 | End: 2023-12-15

## 2023-12-08 RX ORDER — AMLODIPINE BESYLATE 2.5 MG/1
2.5 TABLET ORAL DAILY
Qty: 30 TABLET | Refills: 3 | DISCHARGE
Start: 2023-12-09

## 2023-12-08 RX ADMIN — ENOXAPARIN SODIUM 30 MG: 100 INJECTION SUBCUTANEOUS at 08:25

## 2023-12-08 RX ADMIN — CLOPIDOGREL BISULFATE 75 MG: 75 TABLET ORAL at 08:25

## 2023-12-08 RX ADMIN — SODIUM CHLORIDE: 9 INJECTION, SOLUTION INTRAVENOUS at 04:15

## 2023-12-08 RX ADMIN — FOLIC ACID 1 MG: 1 TABLET ORAL at 08:24

## 2023-12-08 RX ADMIN — FLUOXETINE HYDROCHLORIDE 40 MG: 20 CAPSULE ORAL at 08:25

## 2023-12-08 RX ADMIN — METOPROLOL SUCCINATE 25 MG: 25 TABLET, EXTENDED RELEASE ORAL at 08:24

## 2023-12-08 RX ADMIN — SODIUM CHLORIDE: 9 INJECTION, SOLUTION INTRAVENOUS at 13:27

## 2023-12-08 RX ADMIN — AMLODIPINE BESYLATE 2.5 MG: 5 TABLET ORAL at 08:24

## 2023-12-08 RX ADMIN — SODIUM CHLORIDE, PRESERVATIVE FREE 10 ML: 5 INJECTION INTRAVENOUS at 08:26

## 2023-12-08 RX ADMIN — ASPIRIN 81 MG: 81 TABLET, COATED ORAL at 08:23

## 2023-12-08 RX ADMIN — PRAVASTATIN SODIUM 40 MG: 20 TABLET ORAL at 08:25

## 2023-12-08 RX ADMIN — HYDRALAZINE HYDROCHLORIDE 10 MG: 20 INJECTION, SOLUTION INTRAMUSCULAR; INTRAVENOUS at 04:27

## 2023-12-08 RX ADMIN — OXYBUTYNIN CHLORIDE 5 MG: 5 TABLET ORAL at 08:24

## 2023-12-08 NOTE — DISCHARGE SUMMARY
[facility]   Discharge summary   Patient ID:  Shannen Orr  [id number]  81 y.o.  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 Patient has been unable to ambulate at home and became so weak causing her legs to give out and her falling. Patient underwent a CT head which was negative acute. Chest x-ray revealed possible left pleural effusion. X-rays of her knee and hip were both negative for any acute fractures. Labs revealed an elevated BUN/creatinine 60/2.5. Patient is admitted to intermediate telemetry unit for further workup and treatment. Her hospital course and problems progressed as follows:    Patient is an 81year-old female admitted to [facility] for  NICK  -Monitor labs  -60/2.5 > 45/1.7  -Baseline creatinine 1.2  -Urine studies  -New IVF NS at 100  -Avoid nephrotoxins  -Hold home losartan     12/8/2023  --hold nephrotoxins, monitor BP  --fluids for perfusion  --renal fxn is back to baseline  --pt can likely d/c today  --culture growing gram neg rods, will send on cipro    On 12/8/2023, the patient was dc'd to snf/[facility] in stable condition.      Recent Labs     12/06/23  1208 12/07/23  4984 fair    Patient Instructions:      Medication List        START taking these medications      ciprofloxacin 500 MG tablet  Commonly known as: CIPRO  Take 1 tablet by mouth 2 times daily for 7 days            CHANGE how you take these medications      amLODIPine 2.5 MG tablet  Commonly known as: NORVASC  Take 1 tablet by mouth daily  Start taking on: December 9, 2023  What changed:   medication strength  how much to take            CONTINUE taking these medications      aspirin 81 MG EC tablet     clopidogrel 75 MG tablet  Commonly known as: PLAVIX  Take 1 tablet by mouth daily. colestipol 1 g tablet  Commonly known as: COLESTID     donepezil 5 MG tablet  Commonly known as: ARICEPT  Take 1 tablet by mouth nightly     FLUoxetine 20 MG capsule  Commonly known as: PROZAC     folic acid 1 MG tablet  Commonly known as: FOLVITE     glipiZIDE 5 MG tablet  Commonly known as: GLUCOTROL     hydroCHLOROthiazide 12.5 MG capsule  Commonly known as: MICROZIDE     Januvia 100 MG tablet  Generic drug: SITagliptin     metoprolol succinate 25 MG extended release tablet  Commonly known as: TOPROL XL  Take 1 tablet by mouth daily. oxybutynin 5 MG tablet  Commonly known as: DITROPAN     pravastatin 40 MG tablet  Commonly known as: PRAVACHOL     Vitamin D3 50 MCG (2000 UT) Caps            STOP taking these medications      docusate sodium 100 MG capsule  Commonly known as: COLACE     insulin lispro 100 UNIT/ML Soln injection vial  Commonly known as: HUMALOG     losartan 50 MG tablet  Commonly known as: COZAAR               Where to Get Your Medications        Information about where to get these medications is not yet available    Ask your nurse or doctor about these medications  amLODIPine 2.5 MG tablet  ciprofloxacin 500 MG tablet       Activity: ambulate in house  Diet: regular diet    Pt has been advised to: Follow-up with Eusebia Grady MD in 1 week.   Follow-up with consultants as recommended by them    Note that over 30

## 2023-12-08 NOTE — PLAN OF CARE
Problem: Discharge Planning  Goal: Discharge to home or other facility with appropriate resources  12/7/2023 0515 by Judge Luis RN  Outcome: Progressing  12/6/2023 2257 by Judge Luis RN  Outcome: Progressing     Problem: Skin/Tissue Integrity  Goal: Absence of new skin breakdown  Description: 1. Monitor for areas of redness and/or skin breakdown  2. Assess vascular access sites hourly  3. Every 4-6 hours minimum:  Change oxygen saturation probe site  4. Every 4-6 hours:  If on nasal continuous positive airway pressure, respiratory therapy assess nares and determine need for appliance change or resting period.   12/7/2023 0515 by Judge Luis RN  Outcome: Progressing  12/6/2023 2257 by Judge Luis RN  Outcome: Progressing     Problem: Safety - Adult  Goal: Free from fall injury  12/7/2023 0515 by Judge Luis RN  Outcome: Progressing  12/6/2023 2257 by Judge Luis RN  Outcome: Progressing     Problem: ABCDS Injury Assessment  Goal: Absence of physical injury  12/7/2023 0515 by Judge Luis RN  Outcome: Progressing  12/6/2023 2257 by Judge Luis RN  Outcome: Progressing
Problem: Discharge Planning  Goal: Discharge to home or other facility with appropriate resources  Outcome: Progressing     Problem: Skin/Tissue Integrity  Goal: Absence of new skin breakdown  Description: 1. Monitor for areas of redness and/or skin breakdown  2. Assess vascular access sites hourly  3. Every 4-6 hours minimum:  Change oxygen saturation probe site  4. Every 4-6 hours:  If on nasal continuous positive airway pressure, respiratory therapy assess nares and determine need for appliance change or resting period.   Outcome: Progressing     Problem: Safety - Adult  Goal: Free from fall injury  Outcome: Progressing     Problem: ABCDS Injury Assessment  Goal: Absence of physical injury  Outcome: Progressing
Problem: Discharge Planning  Goal: Discharge to home or other facility with appropriate resources  Outcome: Progressing     Problem: Skin/Tissue Integrity  Goal: Absence of new skin breakdown  Description: 1. Monitor for areas of redness and/or skin breakdown  2. Assess vascular access sites hourly  3. Every 4-6 hours minimum:  Change oxygen saturation probe site  4. Every 4-6 hours:  If on nasal continuous positive airway pressure, respiratory therapy assess nares and determine need for appliance change or resting period.   Outcome: Progressing     Problem: Safety - Adult  Goal: Free from fall injury  Outcome: Progressing     Problem: ABCDS Injury Assessment  Goal: Absence of physical injury  Outcome: Progressing
Back pain

## 2023-12-08 NOTE — CARE COORDINATION
Received notification from [address] at [facility] at [facility] that facility has received authorization from [facility] and can accept patient for SNF stay today. Will await primary care rounding for discharge disposition    Sanju Garrido, MATTHEW RN  [facility] Case Management  [phone]    Non emergency transportation has been arranged with [facility] W/[facility],  time 4PM.  Patient, facility liaison and bedside nurse notified of scheduled  time. LYNETTE initiated, envelope completed with demos, transport forms and [id number]. MATTHEW Perez RN  [facility] Case Management  [phone]

## 2023-12-09 ENCOUNTER — OUTSIDE SERVICES (OUTPATIENT)
Dept: PRIMARY CARE CLINIC | Age: 88
End: 2023-12-09

## 2023-12-09 DIAGNOSIS — R53.1 GENERAL WEAKNESS: ICD-10-CM

## 2023-12-09 DIAGNOSIS — R53.1 GENERALIZED WEAKNESS: ICD-10-CM

## 2023-12-09 DIAGNOSIS — N17.9 AKI (ACUTE KIDNEY INJURY) (HCC): Primary | ICD-10-CM

## 2023-12-09 DIAGNOSIS — E78.5 HYPERLIPIDEMIA WITH TARGET LDL LESS THAN 100: ICD-10-CM

## 2023-12-09 DIAGNOSIS — R29.6 FALLS FREQUENTLY: ICD-10-CM

## 2023-12-09 DIAGNOSIS — M54.12 CERVICAL RADICULOPATHY AT C5: ICD-10-CM

## 2023-12-09 DIAGNOSIS — E11.65 TYPE 2 DIABETES MELLITUS WITH HYPERGLYCEMIA, WITHOUT LONG-TERM CURRENT USE OF INSULIN (HCC): ICD-10-CM

## 2023-12-09 DIAGNOSIS — F03.90 DEMENTIA, UNSPECIFIED DEMENTIA SEVERITY, UNSPECIFIED DEMENTIA TYPE, UNSPECIFIED WHETHER BEHAVIORAL, PSYCHOTIC, OR MOOD DISTURBANCE OR ANXIETY (HCC): ICD-10-CM

## 2023-12-09 DIAGNOSIS — I10 ESSENTIAL HYPERTENSION, BENIGN: ICD-10-CM

## 2023-12-09 DIAGNOSIS — R53.81 PHYSICAL DECONDITIONING: ICD-10-CM

## 2023-12-09 DIAGNOSIS — N30.00 ACUTE CYSTITIS WITHOUT HEMATURIA: ICD-10-CM

## 2023-12-09 LAB
ACB COMPLEX DNA BLD POS QL NAA+NON-PROBE: NOT DETECTED
ALBUMIN SERPL-MCNC: 3.3 G/DL (ref 3.5–5.2)
ALP SERPL-CCNC: 64 U/L (ref 35–104)
ALT SERPL-CCNC: 13 U/L (ref 0–32)
ANION GAP SERPL CALCULATED.3IONS-SCNC: 13 MMOL/L (ref 7–16)
AST SERPL-CCNC: 20 U/L (ref 0–31)
B FRAGILIS DNA BLD POS QL NAA+NON-PROBE: NOT DETECTED
BILIRUB SERPL-MCNC: 0.7 MG/DL (ref 0–1.2)
BIOFIRE TEST COMMENT: ABNORMAL
BUN SERPL-MCNC: 20 MG/DL (ref 6–23)
C ALBICANS DNA BLD POS QL NAA+NON-PROBE: NOT DETECTED
C AURIS DNA BLD POS QL NAA+NON-PROBE: NOT DETECTED
C GATTII+NEOFOR DNA BLD POS QL NAA+N-PRB: NOT DETECTED
C GLABRATA DNA BLD POS QL NAA+NON-PROBE: NOT DETECTED
C KRUSEI DNA BLD POS QL NAA+NON-PROBE: NOT DETECTED
C PARAP DNA BLD POS QL NAA+NON-PROBE: NOT DETECTED
C TROPICLS DNA BLD POS QL NAA+NON-PROBE: NOT DETECTED
CALCIUM SERPL-MCNC: 9.2 MG/DL (ref 8.6–10.2)
CHLORIDE SERPL-SCNC: 105 MMOL/L (ref 98–107)
CHOLEST SERPL-MCNC: 157 MG/DL
CO2 SERPL-SCNC: 21 MMOL/L (ref 22–29)
CREAT SERPL-MCNC: 1 MG/DL (ref 0.5–1)
E CLOAC COMP DNA BLD POS NAA+NON-PROBE: NOT DETECTED
E COLI DNA BLD POS QL NAA+NON-PROBE: NOT DETECTED
E FAECALIS DNA BLD POS QL NAA+NON-PROBE: NOT DETECTED
E FAECIUM DNA BLD POS QL NAA+NON-PROBE: NOT DETECTED
ENTEROBACTERALES DNA BLD POS NAA+N-PRB: NOT DETECTED
ERYTHROCYTE [DISTWIDTH] IN BLOOD BY AUTOMATED COUNT: 13.2 % (ref 11.5–15)
GFR SERPL CREATININE-BSD FRML MDRD: 54 ML/MIN/1.73M2
GLUCOSE SERPL-MCNC: 134 MG/DL (ref 74–99)
GP B STREP DNA BLD POS QL NAA+NON-PROBE: NOT DETECTED
HAEM INFLU DNA BLD POS QL NAA+NON-PROBE: NOT DETECTED
HBA1C MFR BLD: 5.6 % (ref 4–5.6)
HCT VFR BLD AUTO: 38.4 % (ref 34–48)
HDLC SERPL-MCNC: 41 MG/DL
HGB BLD-MCNC: 12.7 G/DL (ref 11.5–15.5)
K OXYTOCA DNA BLD POS QL NAA+NON-PROBE: NOT DETECTED
KLEBSIELLA SP DNA BLD POS QL NAA+NON-PRB: NOT DETECTED
KLEBSIELLA SP DNA BLD POS QL NAA+NON-PRB: NOT DETECTED
L MONOCYTOG DNA BLD POS QL NAA+NON-PROBE: NOT DETECTED
LDLC SERPL CALC-MCNC: 103 MG/DL
MCH RBC QN AUTO: 30.7 PG (ref 26–35)
MCHC RBC AUTO-ENTMCNC: 33.1 G/DL (ref 32–34.5)
MCV RBC AUTO: 92.8 FL (ref 80–99.9)
MECA+MECC ISLT/SPM QL: NOT DETECTED
MICROORGANISM SPEC CULT: ABNORMAL
MICROORGANISM/AGENT SPEC: ABNORMAL
MICROORGANISM/AGENT SPEC: ABNORMAL
N MEN DNA BLD POS QL NAA+NON-PROBE: NOT DETECTED
P AERUGINOSA DNA BLD POS NAA+NON-PROBE: NOT DETECTED
PLATELET # BLD AUTO: 266 K/UL (ref 130–450)
PMV BLD AUTO: 10.1 FL (ref 7–12)
POTASSIUM SERPL-SCNC: 3.7 MMOL/L (ref 3.5–5)
PROT SERPL-MCNC: 5.9 G/DL (ref 6.4–8.3)
PROTEUS SP DNA BLD POS QL NAA+NON-PROBE: NOT DETECTED
RBC # BLD AUTO: 4.14 M/UL (ref 3.5–5.5)
S AUREUS DNA BLD POS QL NAA+NON-PROBE: NOT DETECTED
S AUREUS+CONS DNA BLD POS NAA+NON-PROBE: DETECTED
S EPIDERMIDIS DNA BLD POS QL NAA+NON-PRB: DETECTED
S LUGDUNENSIS DNA BLD POS QL NAA+NON-PRB: NOT DETECTED
S MALTOPHILIA DNA BLD POS QL NAA+NON-PRB: NOT DETECTED
S MARCESCENS DNA BLD POS NAA+NON-PROBE: NOT DETECTED
S PNEUM DNA BLD POS QL NAA+NON-PROBE: NOT DETECTED
S PYO DNA BLD POS QL NAA+NON-PROBE: NOT DETECTED
SALMONELLA DNA BLD POS QL NAA+NON-PROBE: NOT DETECTED
SERVICE CMNT-IMP: ABNORMAL
SERVICE CMNT-IMP: ABNORMAL
SODIUM SERPL-SCNC: 139 MMOL/L (ref 132–146)
SPECIMEN DESCRIPTION: ABNORMAL
STREPTOCOCCUS DNA BLD POS NAA+NON-PROBE: NOT DETECTED
TRIGL SERPL-MCNC: 67 MG/DL
TSH SERPL DL<=0.05 MIU/L-ACNC: 3.26 UIU/ML (ref 0.27–4.2)
VLDLC SERPL CALC-MCNC: 13 MG/DL
WBC OTHER # BLD: 7.5 K/UL (ref 4.5–11.5)

## 2023-12-09 NOTE — PROGRESS NOTES
Marcela Henao (:  1935) is a 81 y.o. female. 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 Social History     Socioeconomic History    Marital status:    Tobacco Use    Smoking status: Former     Packs/day: 2.00     Years: 30.00     Additional pack years: 0.00     Total pack years: 60.00     Types: Cigarettes     Quit date: 10/3/1985     Years since quittin.2    Smokeless tobacco: Never   Substance and Sexual Activity    Alcohol use: Yes     Comment: rarely    Drug use: No     Social Determinants of Health     Food Insecurity: No Food Insecurity (2023)    Hunger Vital Sign     Worried About Running Out of Food in the Last Year: Never true     Ran Out of Food in the Last Year: Never true   Transportation Needs: No Transportation Needs (2023)    PRAPARE - Transportation     Lack of Transportation (Medical): No     Lack of Transportation (Non-Medical): No   Housing Stability: Low Risk  (2023)    Housing Stability Vital Sign     Unable to Pay for Housing in the Last Year: No     Number of Places Lived in the Last Year: 1     Unstable Housing in the Last Year: No        HPI    Marcela Carpio is a pleasant and cooperative, however 41-year-old female. Hospital course discussed in brief. Patient states that she was admitted for arthritis.   She is unclear what they did for her while she was in the hospital.  I explained to the patient that they gave her IV fluids for kidney injury and she

## 2023-12-09 NOTE — ED PROVIDER NOTES
intracranial hemorrhage or mass    Interpretation per the Radiologist below, if available at the time of this note:    XR CHEST PORTABLE   Final Result   No acute process. Mild cardiomegaly. Question minimal left pleural effusion. XR HIP 2-3 VW W PELVIS LEFT   Final Result   No acute abnormality of the pelvis and left hip. XR KNEE LEFT (3 VIEWS)   Final Result   No acute bony abnormalities. Moderate tricompartmental osteoarthritis. Question minimal joint effusion. CT Head W/O Contrast   Final Result   No acute intracranial abnormality. Age-related and chronic changes as noted above. No significant interval change. XR KNEE LEFT (3 VIEWS)    Result Date: 12/6/2023  EXAMINATION: THREE XRAY VIEWS OF THE LEFT KNEE 12/6/2023 1:26 pm COMPARISON: None. HISTORY: ORDERING SYSTEM PROVIDED HISTORY: fall 2 days ago, left knee pain TECHNOLOGIST PROVIDED HISTORY: Reason for exam:->fall 2 days ago, left knee pain FINDINGS: No acute bony abnormalities. Moderate tricompartmental osteoarthritis and lateral joint space narrowing. Hypertrophic bony formation. There is mild suprapatellar soft tissue swelling. No acute bony abnormalities. Moderate tricompartmental osteoarthritis. Question minimal joint effusion. XR HIP 2-3 VW W PELVIS LEFT    Result Date: 12/6/2023  EXAMINATION: ONE XRAY VIEW OF THE PELVIS AND TWO XRAY VIEWS LEFT HIP 12/6/2023 1:26 pm COMPARISON: None. HISTORY: ORDERING SYSTEM PROVIDED HISTORY: fall 2 days, left leg weakness TECHNOLOGIST PROVIDED HISTORY: Reason for exam:->fall 2 days, left leg weakness FINDINGS: No evidence of pelvic fracture. Bilateral hips demonstrate normal alignment. No focal osseous lesion. SI joints are symmetric. No acute abnormality of the pelvis and left hip. XR CHEST PORTABLE    Result Date: 12/6/2023  EXAMINATION: ONE XRAY VIEW OF THE CHEST 12/6/2023 1:26 pm COMPARISON: None.  HISTORY: ORDERING SYSTEM PROVIDED suggest acute pathology. CMP shows blood glucose of 158, BUN of 60, creatinine of 2.5 which is an acute kidney injury. Troponins are stable x 2. Lactic acid is not elevated. COVID and influenza swabs are negative. Urinalysis shows 3+ bacteria but negative nitrites and negative leukocyte esterase with no WBCs. EKG does not suggest acute ischemic pathology. CT head shows age-related changes no acute pathology. Chest x-ray shows no acute process. X-ray of the left hip and pelvis shows no acute fracture or dislocation. X-ray of the left knee shows osteoarthritis. Given patient's significant NICK and altered mental status and ambulatory dysfunction, decision was made to admit the patient. Patient's  and the patient are agreeable with plan. Case was discussed with APRN for Dr. Indy Russell, internal medicine, agrees to admit the patient. At the time of admission, the patient was hemodynamically stable. CONSULTS: (Who and What was discussed)  IP CONSULT TO INTERNAL MEDICINE  IP CONSULT TO IV TEAM        I am the Primary Clinician of Record. FINAL IMPRESSION      1. Acute kidney injury [facility])          DISPOSITION/PLAN     DISPOSITION Admitted 12/06/2023 03:15:06 PM      PATIENT REFERRED TO:  [facility] at [address] [place] [address] E.  [id number]  [phone]          DISCHARGE MEDICATIONS:  Discharge Medication List as of 12/8/2023 12:39 PM          DISCONTINUED MEDICATIONS:  Discharge Medication List as of 12/8/2023 12:39 PM        STOP taking these medications       insulin lispro (HUMALOG) 100 UNIT/ML SOLN injection vial Comments:   Reason for Stopping:         insulin lispro (HUMALOG) 100 UNIT/ML SOLN injection vial Comments:   Reason for Stopping:         losartan (COZAAR) 50 MG tablet Comments:   Reason for Stopping:         docusate sodium (COLACE) 100 MG capsule Comments:   Reason for Stopping:                      Patient was seen and evaluated by myself and my attending

## 2023-12-10 LAB
MICROORGANISM SPEC CULT: NORMAL
MICROORGANISM SPEC CULT: NORMAL
SERVICE CMNT-IMP: NORMAL
SERVICE CMNT-IMP: NORMAL
SPECIMEN DESCRIPTION: NORMAL
SPECIMEN DESCRIPTION: NORMAL

## 2023-12-12 ENCOUNTER — OUTSIDE SERVICES (OUTPATIENT)
Dept: PRIMARY CARE CLINIC | Age: 88
End: 2023-12-12
Payer: MEDICARE

## 2023-12-12 DIAGNOSIS — R21 SKIN RASH: Primary | ICD-10-CM

## 2023-12-12 PROCEDURE — 99308 SBSQ NF CARE LOW MDM 20: CPT

## 2023-12-12 ASSESSMENT — ENCOUNTER SYMPTOMS
WHEEZING: 0
BACK PAIN: 0
VOMITING: 0
DIARRHEA: 0
ABDOMINAL PAIN: 0
SHORTNESS OF BREATH: 0
CONSTIPATION: 0
RHINORRHEA: 0
SORE THROAT: 0
COUGH: 0
PHOTOPHOBIA: 0

## 2023-12-12 NOTE — PROGRESS NOTES
Marcela Henao (:  1935) is a 81 y.o. female. Subjective   SUBJECTIVE/OBJECTIVE:  \[facility] [place] [facility]\""  Location: [address]  All nursing and progress notes reviewed. Seen acutely per nursing due to rash. Nursing reports patient was recently admitted to facility with a rash. Topical hydrocortisone was started. Not giving her any relief. Currently on cipro due to UTI. Rash only localized to her back. Reports rash is itchy. 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 HENT:  Negative for congestion, hearing loss, rhinorrhea and sore throat. Eyes:  Negative for photophobia and visual disturbance. Respiratory:  Negative for cough, shortness of breath and wheezing. Cardiovascular:  Negative for chest pain and palpitations. Gastrointestinal:  Negative for abdominal pain, constipation, diarrhea and vomiting. Musculoskeletal:  Negative for arthralgias, back pain, neck pain and neck stiffness. Skin:  Positive for rash. All other systems reviewed and are negative. Objective   Physical Exam  Vitals and nursing note reviewed. Constitutional:       General: She is not in acute distress. Appearance: Normal appearance. She is not ill-appearing. HENT:      Head: Normocephalic and atraumatic. Right Ear: External ear normal.      Left Ear: External ear normal.      Nose: Nose normal.   Eyes:      Extraocular Movements: Extraocular movements intact. Conjunctiva/sclera: Conjunctivae normal.   Pulmonary:      Effort: Pulmonary effort is normal.   Skin:     Findings: Rash present.

## 2023-12-15 LAB
ALBUMIN SERPL-MCNC: 3.5 G/DL (ref 3.5–5.2)
ALP SERPL-CCNC: 60 U/L (ref 35–104)
ALT SERPL-CCNC: 11 U/L (ref 0–32)
ANION GAP SERPL CALCULATED.3IONS-SCNC: 16 MMOL/L (ref 7–16)
AST SERPL-CCNC: 16 U/L (ref 0–31)
BILIRUB SERPL-MCNC: 0.4 MG/DL (ref 0–1.2)
BUN SERPL-MCNC: 36 MG/DL (ref 6–23)
CALCIUM SERPL-MCNC: 9.7 MG/DL (ref 8.6–10.2)
CHLORIDE SERPL-SCNC: 100 MMOL/L (ref 98–107)
CO2 SERPL-SCNC: 22 MMOL/L (ref 22–29)
CREAT SERPL-MCNC: 1.6 MG/DL (ref 0.5–1)
ERYTHROCYTE [DISTWIDTH] IN BLOOD BY AUTOMATED COUNT: 13.2 % (ref 11.5–15)
GFR SERPL CREATININE-BSD FRML MDRD: 30 ML/MIN/1.73M2
GLUCOSE SERPL-MCNC: 140 MG/DL (ref 74–99)
HCT VFR BLD AUTO: 37.7 % (ref 34–48)
HGB BLD-MCNC: 12.2 G/DL (ref 11.5–15.5)
MCH RBC QN AUTO: 30 PG (ref 26–35)
MCHC RBC AUTO-ENTMCNC: 32.4 G/DL (ref 32–34.5)
MCV RBC AUTO: 92.9 FL (ref 80–99.9)
PLATELET # BLD AUTO: 321 K/UL (ref 130–450)
PMV BLD AUTO: 9.9 FL (ref 7–12)
POTASSIUM SERPL-SCNC: 3.6 MMOL/L (ref 3.5–5)
PROT SERPL-MCNC: 6 G/DL (ref 6.4–8.3)
RBC # BLD AUTO: 4.06 M/UL (ref 3.5–5.5)
SODIUM SERPL-SCNC: 138 MMOL/L (ref 132–146)
WBC OTHER # BLD: 9.7 K/UL (ref 4.5–11.5)

## 2023-12-29 LAB
ALBUMIN SERPL-MCNC: 3.3 G/DL (ref 3.5–5.2)
ALP SERPL-CCNC: 53 U/L (ref 35–104)
ALT SERPL-CCNC: 9 U/L (ref 0–32)
ANION GAP SERPL CALCULATED.3IONS-SCNC: 12 MMOL/L (ref 7–16)
AST SERPL-CCNC: 14 U/L (ref 0–31)
BILIRUB SERPL-MCNC: 0.6 MG/DL (ref 0–1.2)
BUN SERPL-MCNC: 25 MG/DL (ref 6–23)
CALCIUM SERPL-MCNC: 9.4 MG/DL (ref 8.6–10.2)
CHLORIDE SERPL-SCNC: 105 MMOL/L (ref 98–107)
CO2 SERPL-SCNC: 26 MMOL/L (ref 22–29)
CREAT SERPL-MCNC: 1.4 MG/DL (ref 0.5–1)
ERYTHROCYTE [DISTWIDTH] IN BLOOD BY AUTOMATED COUNT: 13.2 % (ref 11.5–15)
GFR SERPL CREATININE-BSD FRML MDRD: 37 ML/MIN/1.73M2
GLUCOSE SERPL-MCNC: 101 MG/DL (ref 74–99)
HCT VFR BLD AUTO: 36.9 % (ref 34–48)
HGB BLD-MCNC: 12.1 G/DL (ref 11.5–15.5)
MCH RBC QN AUTO: 30.5 PG (ref 26–35)
MCHC RBC AUTO-ENTMCNC: 32.8 G/DL (ref 32–34.5)
MCV RBC AUTO: 92.9 FL (ref 80–99.9)
PLATELET # BLD AUTO: 230 K/UL (ref 130–450)
PMV BLD AUTO: 9.9 FL (ref 7–12)
POTASSIUM SERPL-SCNC: 3.3 MMOL/L (ref 3.5–5)
PROT SERPL-MCNC: 5.7 G/DL (ref 6.4–8.3)
RBC # BLD AUTO: 3.97 M/UL (ref 3.5–5.5)
SODIUM SERPL-SCNC: 143 MMOL/L (ref 132–146)
WBC OTHER # BLD: 7.3 K/UL (ref 4.5–11.5)

## 2024-01-03 RX ORDER — POTASSIUM CHLORIDE 20 MEQ/1
TABLET, EXTENDED RELEASE ORAL
Qty: 7 TABLET | Refills: 5 | Status: SHIPPED | OUTPATIENT
Start: 2024-01-03

## 2024-01-08 RX ORDER — DONEPEZIL HYDROCHLORIDE 5 MG/1
5 TABLET, FILM COATED ORAL DAILY
Qty: 30 TABLET | Refills: 5 | Status: SHIPPED | OUTPATIENT
Start: 2024-01-08

## 2024-01-08 RX ORDER — ATORVASTATIN CALCIUM 10 MG/1
10 TABLET, FILM COATED ORAL DAILY
Qty: 30 TABLET | Refills: 5 | Status: SHIPPED | OUTPATIENT
Start: 2024-01-08

## 2024-02-25 ENCOUNTER — APPOINTMENT (OUTPATIENT)
Dept: GENERAL RADIOLOGY | Age: 89
End: 2024-02-25
Payer: MEDICARE

## 2024-02-25 ENCOUNTER — HOSPITAL ENCOUNTER (OUTPATIENT)
Age: 89
Setting detail: OBSERVATION
Discharge: OTHER INSTITUTION | End: 2024-02-27
Attending: STUDENT IN AN ORGANIZED HEALTH CARE EDUCATION/TRAINING PROGRAM | Admitting: INTERNAL MEDICINE
Payer: MEDICARE

## 2024-02-25 ENCOUNTER — APPOINTMENT (OUTPATIENT)
Dept: CT IMAGING | Age: 89
End: 2024-02-25
Payer: MEDICARE

## 2024-02-25 DIAGNOSIS — R29.6 FREQUENT FALLS: Primary | ICD-10-CM

## 2024-02-25 DIAGNOSIS — R53.1 GENERAL WEAKNESS: ICD-10-CM

## 2024-02-25 DIAGNOSIS — R26.2 DIFFICULTY IN WALKING: ICD-10-CM

## 2024-02-25 LAB
ALBUMIN SERPL-MCNC: 3.4 G/DL (ref 3.5–5.2)
ALP SERPL-CCNC: 69 U/L (ref 35–104)
ALT SERPL-CCNC: 14 U/L (ref 0–32)
ANION GAP SERPL CALCULATED.3IONS-SCNC: 8 MMOL/L (ref 7–16)
AST SERPL-CCNC: 18 U/L (ref 0–31)
BASOPHILS # BLD: 0.05 K/UL (ref 0–0.2)
BASOPHILS NFR BLD: 1 % (ref 0–2)
BILIRUB SERPL-MCNC: 0.7 MG/DL (ref 0–1.2)
BILIRUB UR QL STRIP: NEGATIVE
BUN SERPL-MCNC: 25 MG/DL (ref 6–23)
CALCIUM SERPL-MCNC: 9.6 MG/DL (ref 8.6–10.2)
CHLORIDE SERPL-SCNC: 102 MMOL/L (ref 98–107)
CLARITY UR: CLEAR
CO2 SERPL-SCNC: 27 MMOL/L (ref 22–29)
COLOR UR: YELLOW
CREAT SERPL-MCNC: 1.2 MG/DL (ref 0.5–1)
EOSINOPHIL # BLD: 0.24 K/UL (ref 0.05–0.5)
EOSINOPHILS RELATIVE PERCENT: 2 % (ref 0–6)
ERYTHROCYTE [DISTWIDTH] IN BLOOD BY AUTOMATED COUNT: 13.2 % (ref 11.5–15)
GFR SERPL CREATININE-BSD FRML MDRD: 43 ML/MIN/1.73M2
GLUCOSE SERPL-MCNC: 138 MG/DL (ref 74–99)
GLUCOSE UR STRIP-MCNC: NEGATIVE MG/DL
HCT VFR BLD AUTO: 40.1 % (ref 34–48)
HGB BLD-MCNC: 13 G/DL (ref 11.5–15.5)
HGB UR QL STRIP.AUTO: NEGATIVE
IMM GRANULOCYTES # BLD AUTO: 0.05 K/UL (ref 0–0.58)
IMM GRANULOCYTES NFR BLD: 1 % (ref 0–5)
KETONES UR STRIP-MCNC: NEGATIVE MG/DL
LACTATE BLDV-SCNC: 1.1 MMOL/L (ref 0.5–2.2)
LEUKOCYTE ESTERASE UR QL STRIP: NEGATIVE
LIPASE SERPL-CCNC: 33 U/L (ref 13–60)
LYMPHOCYTES NFR BLD: 1.47 K/UL (ref 1.5–4)
LYMPHOCYTES RELATIVE PERCENT: 13 % (ref 20–42)
MAGNESIUM SERPL-MCNC: 1.9 MG/DL (ref 1.6–2.6)
MCH RBC QN AUTO: 30.1 PG (ref 26–35)
MCHC RBC AUTO-ENTMCNC: 32.4 G/DL (ref 32–34.5)
MCV RBC AUTO: 92.8 FL (ref 80–99.9)
MONOCYTES NFR BLD: 0.89 K/UL (ref 0.1–0.95)
MONOCYTES NFR BLD: 8 % (ref 2–12)
NEUTROPHILS NFR BLD: 76 % (ref 43–80)
NEUTS SEG NFR BLD: 8.38 K/UL (ref 1.8–7.3)
NITRITE UR QL STRIP: NEGATIVE
PH UR STRIP: 7.5 [PH] (ref 5–9)
PLATELET # BLD AUTO: 277 K/UL (ref 130–450)
PMV BLD AUTO: 9.6 FL (ref 7–12)
POTASSIUM SERPL-SCNC: 3.8 MMOL/L (ref 3.5–5)
PROT SERPL-MCNC: 6.1 G/DL (ref 6.4–8.3)
PROT UR STRIP-MCNC: NEGATIVE MG/DL
RBC # BLD AUTO: 4.32 M/UL (ref 3.5–5.5)
RBC #/AREA URNS HPF: NORMAL /HPF
SARS-COV-2 RDRP RESP QL NAA+PROBE: NOT DETECTED
SODIUM SERPL-SCNC: 137 MMOL/L (ref 132–146)
SP GR UR STRIP: 1.01 (ref 1–1.03)
SPECIMEN DESCRIPTION: NORMAL
TROPONIN I SERPL HS-MCNC: 15 NG/L (ref 0–9)
TROPONIN I SERPL HS-MCNC: 17 NG/L (ref 0–9)
UROBILINOGEN UR STRIP-ACNC: 0.2 EU/DL (ref 0–1)
WBC #/AREA URNS HPF: NORMAL /HPF
WBC OTHER # BLD: 11.1 K/UL (ref 4.5–11.5)

## 2024-02-25 PROCEDURE — 83735 ASSAY OF MAGNESIUM: CPT

## 2024-02-25 PROCEDURE — 83690 ASSAY OF LIPASE: CPT

## 2024-02-25 PROCEDURE — G0378 HOSPITAL OBSERVATION PER HR: HCPCS

## 2024-02-25 PROCEDURE — 80053 COMPREHEN METABOLIC PANEL: CPT

## 2024-02-25 PROCEDURE — 93005 ELECTROCARDIOGRAM TRACING: CPT | Performed by: STUDENT IN AN ORGANIZED HEALTH CARE EDUCATION/TRAINING PROGRAM

## 2024-02-25 PROCEDURE — 84484 ASSAY OF TROPONIN QUANT: CPT

## 2024-02-25 PROCEDURE — 72125 CT NECK SPINE W/O DYE: CPT

## 2024-02-25 PROCEDURE — 87635 SARS-COV-2 COVID-19 AMP PRB: CPT

## 2024-02-25 PROCEDURE — 2580000003 HC RX 258: Performed by: STUDENT IN AN ORGANIZED HEALTH CARE EDUCATION/TRAINING PROGRAM

## 2024-02-25 PROCEDURE — 71045 X-RAY EXAM CHEST 1 VIEW: CPT

## 2024-02-25 PROCEDURE — 85025 COMPLETE CBC W/AUTO DIFF WBC: CPT

## 2024-02-25 PROCEDURE — 73502 X-RAY EXAM HIP UNI 2-3 VIEWS: CPT

## 2024-02-25 PROCEDURE — 99285 EMERGENCY DEPT VISIT HI MDM: CPT

## 2024-02-25 PROCEDURE — 70450 CT HEAD/BRAIN W/O DYE: CPT

## 2024-02-25 PROCEDURE — 83605 ASSAY OF LACTIC ACID: CPT

## 2024-02-25 PROCEDURE — 81001 URINALYSIS AUTO W/SCOPE: CPT

## 2024-02-25 RX ORDER — 0.9 % SODIUM CHLORIDE 0.9 %
1000 INTRAVENOUS SOLUTION INTRAVENOUS ONCE
Status: COMPLETED | OUTPATIENT
Start: 2024-02-25 | End: 2024-02-25

## 2024-02-25 RX ADMIN — SODIUM CHLORIDE 1000 ML: 9 INJECTION, SOLUTION INTRAVENOUS at 15:37

## 2024-02-25 ASSESSMENT — LIFESTYLE VARIABLES
HOW MANY STANDARD DRINKS CONTAINING ALCOHOL DO YOU HAVE ON A TYPICAL DAY: PATIENT DOES NOT DRINK
HOW OFTEN DO YOU HAVE A DRINK CONTAINING ALCOHOL: NEVER

## 2024-02-25 NOTE — ED NOTES
ED to Inpatient Handoff Report    Notified floor that electronic handoff available and patient ready for transport to room 535.    Safety Risks: Lives alone, Hearing problems, and Risk of falls    Patient in Restraints: no    Constant Observer or Patient : no    Telemetry Monitoring Ordered :Yes           Order to transfer to unit without monitor:YES    Last MEWS: 1 Time completed: 1840    Deterioration Index Score:   Predictive Model Details          24 (Normal)  Factor Value    Calculated 2/25/2024 18:41 61% Age 88 years old    Deterioration Index Model 14% Systolic 157     11% Respiratory rate 18     8% WBC count 11.1 k/uL     4% BUN abnormal (25 mg/dL)     2% Sodium 137 mmol/L     1% Potassium 3.8 mmol/L     0% Hematocrit 40.1 %     0% Pulse oximetry 96 %     0% Temperature 98.2 °F (36.8 °C)     0% Pulse 73        Vitals:    02/25/24 1458 02/25/24 1840   BP: (!) 170/80 (!) 157/89   Pulse: 70 73   Resp: 16 18   Temp: 98.2 °F (36.8 °C)    SpO2: 96% 96%   Weight: 84.8 kg (187 lb)          Opportunity for questions and clarification was provided.

## 2024-02-26 ENCOUNTER — APPOINTMENT (OUTPATIENT)
Dept: GENERAL RADIOLOGY | Age: 89
End: 2024-02-26
Payer: MEDICARE

## 2024-02-26 LAB
ALBUMIN SERPL-MCNC: 3.5 G/DL (ref 3.5–5.2)
ALP SERPL-CCNC: 71 U/L (ref 35–104)
ALT SERPL-CCNC: 13 U/L (ref 0–32)
ANION GAP SERPL CALCULATED.3IONS-SCNC: 8 MMOL/L (ref 7–16)
AST SERPL-CCNC: 19 U/L (ref 0–31)
BASOPHILS # BLD: 0.04 K/UL (ref 0–0.2)
BASOPHILS NFR BLD: 0 % (ref 0–2)
BILIRUB DIRECT SERPL-MCNC: <0.2 MG/DL (ref 0–0.3)
BILIRUB INDIRECT SERPL-MCNC: ABNORMAL MG/DL (ref 0–1)
BILIRUB SERPL-MCNC: 0.7 MG/DL (ref 0–1.2)
BUN SERPL-MCNC: 24 MG/DL (ref 6–23)
CALCIUM SERPL-MCNC: 9.5 MG/DL (ref 8.6–10.2)
CHLORIDE SERPL-SCNC: 105 MMOL/L (ref 98–107)
CO2 SERPL-SCNC: 26 MMOL/L (ref 22–29)
CREAT SERPL-MCNC: 1.1 MG/DL (ref 0.5–1)
EKG ATRIAL RATE: 67 BPM
EKG P AXIS: 7 DEGREES
EKG P-R INTERVAL: 248 MS
EKG Q-T INTERVAL: 484 MS
EKG QRS DURATION: 126 MS
EKG QTC CALCULATION (BAZETT): 511 MS
EKG R AXIS: -29 DEGREES
EKG T AXIS: -2 DEGREES
EKG VENTRICULAR RATE: 67 BPM
EOSINOPHIL # BLD: 0.23 K/UL (ref 0.05–0.5)
EOSINOPHILS RELATIVE PERCENT: 3 % (ref 0–6)
ERYTHROCYTE [DISTWIDTH] IN BLOOD BY AUTOMATED COUNT: 13.4 % (ref 11.5–15)
GFR SERPL CREATININE-BSD FRML MDRD: 47 ML/MIN/1.73M2
GLUCOSE SERPL-MCNC: 133 MG/DL (ref 74–99)
HCT VFR BLD AUTO: 39.9 % (ref 34–48)
HGB BLD-MCNC: 12.8 G/DL (ref 11.5–15.5)
IMM GRANULOCYTES # BLD AUTO: 0.03 K/UL (ref 0–0.58)
IMM GRANULOCYTES NFR BLD: 0 % (ref 0–5)
LYMPHOCYTES NFR BLD: 1.41 K/UL (ref 1.5–4)
LYMPHOCYTES RELATIVE PERCENT: 16 % (ref 20–42)
MCH RBC QN AUTO: 29.9 PG (ref 26–35)
MCHC RBC AUTO-ENTMCNC: 32.1 G/DL (ref 32–34.5)
MCV RBC AUTO: 93.2 FL (ref 80–99.9)
MONOCYTES NFR BLD: 0.98 K/UL (ref 0.1–0.95)
MONOCYTES NFR BLD: 11 % (ref 2–12)
NEUTROPHILS NFR BLD: 70 % (ref 43–80)
NEUTS SEG NFR BLD: 6.23 K/UL (ref 1.8–7.3)
PLATELET # BLD AUTO: 266 K/UL (ref 130–450)
PMV BLD AUTO: 10 FL (ref 7–12)
POTASSIUM SERPL-SCNC: 3.8 MMOL/L (ref 3.5–5)
PROT SERPL-MCNC: 6.1 G/DL (ref 6.4–8.3)
RBC # BLD AUTO: 4.28 M/UL (ref 3.5–5.5)
SODIUM SERPL-SCNC: 139 MMOL/L (ref 132–146)
WBC OTHER # BLD: 8.9 K/UL (ref 4.5–11.5)

## 2024-02-26 PROCEDURE — 6370000000 HC RX 637 (ALT 250 FOR IP): Performed by: FAMILY MEDICINE

## 2024-02-26 PROCEDURE — 97530 THERAPEUTIC ACTIVITIES: CPT

## 2024-02-26 PROCEDURE — G0378 HOSPITAL OBSERVATION PER HR: HCPCS

## 2024-02-26 PROCEDURE — 82248 BILIRUBIN DIRECT: CPT

## 2024-02-26 PROCEDURE — 96372 THER/PROPH/DIAG INJ SC/IM: CPT

## 2024-02-26 PROCEDURE — 6370000000 HC RX 637 (ALT 250 FOR IP): Performed by: NURSE PRACTITIONER

## 2024-02-26 PROCEDURE — 93010 ELECTROCARDIOGRAM REPORT: CPT | Performed by: INTERNAL MEDICINE

## 2024-02-26 PROCEDURE — 2580000003 HC RX 258: Performed by: NURSE PRACTITIONER

## 2024-02-26 PROCEDURE — 85025 COMPLETE CBC W/AUTO DIFF WBC: CPT

## 2024-02-26 PROCEDURE — 6360000002 HC RX W HCPCS: Performed by: NURSE PRACTITIONER

## 2024-02-26 PROCEDURE — 36415 COLL VENOUS BLD VENIPUNCTURE: CPT

## 2024-02-26 PROCEDURE — 80053 COMPREHEN METABOLIC PANEL: CPT

## 2024-02-26 PROCEDURE — 6360000002 HC RX W HCPCS: Performed by: FAMILY MEDICINE

## 2024-02-26 PROCEDURE — 97161 PT EVAL LOW COMPLEX 20 MIN: CPT

## 2024-02-26 PROCEDURE — 72110 X-RAY EXAM L-2 SPINE 4/>VWS: CPT

## 2024-02-26 PROCEDURE — 97165 OT EVAL LOW COMPLEX 30 MIN: CPT

## 2024-02-26 PROCEDURE — 96374 THER/PROPH/DIAG INJ IV PUSH: CPT

## 2024-02-26 RX ORDER — HYDROCHLOROTHIAZIDE 12.5 MG/1
12.5 CAPSULE, GELATIN COATED ORAL DAILY
Status: DISCONTINUED | OUTPATIENT
Start: 2024-02-26 | End: 2024-02-26 | Stop reason: CLARIF

## 2024-02-26 RX ORDER — AMLODIPINE BESYLATE 2.5 MG/1
2.5 TABLET ORAL DAILY
Status: DISCONTINUED | OUTPATIENT
Start: 2024-02-26 | End: 2024-02-27 | Stop reason: HOSPADM

## 2024-02-26 RX ORDER — SENNOSIDES A AND B 8.6 MG/1
1 TABLET, FILM COATED ORAL DAILY PRN
Status: DISCONTINUED | OUTPATIENT
Start: 2024-02-26 | End: 2024-02-27 | Stop reason: HOSPADM

## 2024-02-26 RX ORDER — ACETAMINOPHEN 650 MG/1
650 SUPPOSITORY RECTAL EVERY 6 HOURS PRN
Status: DISCONTINUED | OUTPATIENT
Start: 2024-02-26 | End: 2024-02-27 | Stop reason: HOSPADM

## 2024-02-26 RX ORDER — POTASSIUM CHLORIDE 20 MEQ/1
40 TABLET, EXTENDED RELEASE ORAL PRN
Status: DISCONTINUED | OUTPATIENT
Start: 2024-02-26 | End: 2024-02-27 | Stop reason: HOSPADM

## 2024-02-26 RX ORDER — ASPIRIN 81 MG/1
81 TABLET ORAL DAILY
Status: DISCONTINUED | OUTPATIENT
Start: 2024-02-26 | End: 2024-02-27 | Stop reason: HOSPADM

## 2024-02-26 RX ORDER — SODIUM CHLORIDE 0.9 % (FLUSH) 0.9 %
5-40 SYRINGE (ML) INJECTION EVERY 12 HOURS SCHEDULED
Status: DISCONTINUED | OUTPATIENT
Start: 2024-02-26 | End: 2024-02-27 | Stop reason: HOSPADM

## 2024-02-26 RX ORDER — ATORVASTATIN CALCIUM 10 MG/1
10 TABLET, FILM COATED ORAL DAILY
Status: DISCONTINUED | OUTPATIENT
Start: 2024-02-26 | End: 2024-02-27 | Stop reason: HOSPADM

## 2024-02-26 RX ORDER — HYDROCHLOROTHIAZIDE 12.5 MG/1
12.5 TABLET ORAL DAILY
Status: DISCONTINUED | OUTPATIENT
Start: 2024-02-26 | End: 2024-02-27

## 2024-02-26 RX ORDER — ENOXAPARIN SODIUM 100 MG/ML
40 INJECTION SUBCUTANEOUS DAILY
Status: DISCONTINUED | OUTPATIENT
Start: 2024-02-26 | End: 2024-02-27 | Stop reason: HOSPADM

## 2024-02-26 RX ORDER — MAGNESIUM SULFATE IN WATER 40 MG/ML
2000 INJECTION, SOLUTION INTRAVENOUS PRN
Status: DISCONTINUED | OUTPATIENT
Start: 2024-02-26 | End: 2024-02-27 | Stop reason: HOSPADM

## 2024-02-26 RX ORDER — FLUOXETINE HYDROCHLORIDE 20 MG/1
40 CAPSULE ORAL DAILY
Status: DISCONTINUED | OUTPATIENT
Start: 2024-02-26 | End: 2024-02-27 | Stop reason: HOSPADM

## 2024-02-26 RX ORDER — SODIUM CHLORIDE 9 MG/ML
INJECTION, SOLUTION INTRAVENOUS PRN
Status: DISCONTINUED | OUTPATIENT
Start: 2024-02-26 | End: 2024-02-27 | Stop reason: HOSPADM

## 2024-02-26 RX ORDER — OXYBUTYNIN CHLORIDE 5 MG/1
5 TABLET ORAL 2 TIMES DAILY
Status: DISCONTINUED | OUTPATIENT
Start: 2024-02-26 | End: 2024-02-27 | Stop reason: HOSPADM

## 2024-02-26 RX ORDER — GLIPIZIDE 5 MG/1
5 TABLET ORAL
Status: DISCONTINUED | OUTPATIENT
Start: 2024-02-26 | End: 2024-02-27 | Stop reason: HOSPADM

## 2024-02-26 RX ORDER — CHOLECALCIFEROL (VITAMIN D3) 50 MCG
2000 TABLET ORAL DAILY
Status: DISCONTINUED | OUTPATIENT
Start: 2024-02-26 | End: 2024-02-27 | Stop reason: HOSPADM

## 2024-02-26 RX ORDER — SODIUM CHLORIDE 0.9 % (FLUSH) 0.9 %
5-40 SYRINGE (ML) INJECTION PRN
Status: DISCONTINUED | OUTPATIENT
Start: 2024-02-26 | End: 2024-02-27 | Stop reason: HOSPADM

## 2024-02-26 RX ORDER — CLOPIDOGREL BISULFATE 75 MG/1
75 TABLET ORAL DAILY
Status: DISCONTINUED | OUTPATIENT
Start: 2024-02-26 | End: 2024-02-27 | Stop reason: HOSPADM

## 2024-02-26 RX ORDER — TROSPIUM CHLORIDE 20 MG/1
20 TABLET, FILM COATED ORAL
Status: DISCONTINUED | OUTPATIENT
Start: 2024-02-26 | End: 2024-02-27 | Stop reason: HOSPADM

## 2024-02-26 RX ORDER — ALOGLIPTIN 12.5 MG/1
12.5 TABLET, FILM COATED ORAL DAILY
Status: DISCONTINUED | OUTPATIENT
Start: 2024-02-26 | End: 2024-02-27 | Stop reason: HOSPADM

## 2024-02-26 RX ORDER — DEXAMETHASONE SODIUM PHOSPHATE 10 MG/ML
6 INJECTION INTRAMUSCULAR; INTRAVENOUS EVERY 24 HOURS
Status: DISCONTINUED | OUTPATIENT
Start: 2024-02-26 | End: 2024-02-27 | Stop reason: HOSPADM

## 2024-02-26 RX ORDER — METOPROLOL SUCCINATE 25 MG/1
25 TABLET, EXTENDED RELEASE ORAL DAILY
Status: DISCONTINUED | OUTPATIENT
Start: 2024-02-26 | End: 2024-02-27

## 2024-02-26 RX ORDER — CHOLESTYRAMINE LIGHT 4 G/5.7G
4 POWDER, FOR SUSPENSION ORAL DAILY
Status: DISCONTINUED | OUTPATIENT
Start: 2024-02-26 | End: 2024-02-27 | Stop reason: HOSPADM

## 2024-02-26 RX ORDER — POTASSIUM CHLORIDE 7.45 MG/ML
10 INJECTION INTRAVENOUS PRN
Status: DISCONTINUED | OUTPATIENT
Start: 2024-02-26 | End: 2024-02-27 | Stop reason: HOSPADM

## 2024-02-26 RX ORDER — ACETAMINOPHEN 325 MG/1
650 TABLET ORAL EVERY 6 HOURS PRN
Status: DISCONTINUED | OUTPATIENT
Start: 2024-02-26 | End: 2024-02-27 | Stop reason: HOSPADM

## 2024-02-26 RX ORDER — FOLIC ACID 1 MG/1
1 TABLET ORAL DAILY
Status: DISCONTINUED | OUTPATIENT
Start: 2024-02-26 | End: 2024-02-27 | Stop reason: HOSPADM

## 2024-02-26 RX ORDER — LANOLIN ALCOHOL/MO/W.PET/CERES
3 CREAM (GRAM) TOPICAL NIGHTLY PRN
Status: DISCONTINUED | OUTPATIENT
Start: 2024-02-26 | End: 2024-02-27 | Stop reason: HOSPADM

## 2024-02-26 RX ORDER — DONEPEZIL HYDROCHLORIDE 5 MG/1
5 TABLET, FILM COATED ORAL DAILY
Status: DISCONTINUED | OUTPATIENT
Start: 2024-02-26 | End: 2024-02-27 | Stop reason: HOSPADM

## 2024-02-26 RX ADMIN — DEXAMETHASONE SODIUM PHOSPHATE 6 MG: 10 INJECTION INTRAMUSCULAR; INTRAVENOUS at 14:31

## 2024-02-26 RX ADMIN — HYDROCHLOROTHIAZIDE 12.5 MG: 12.5 TABLET ORAL at 08:20

## 2024-02-26 RX ADMIN — CHOLESTYRAMINE 4 G: 4 POWDER, FOR SUSPENSION ORAL at 08:20

## 2024-02-26 RX ADMIN — FLUOXETINE 40 MG: 20 CAPSULE ORAL at 08:20

## 2024-02-26 RX ADMIN — CLOPIDOGREL BISULFATE 75 MG: 75 TABLET ORAL at 08:20

## 2024-02-26 RX ADMIN — DONEPEZIL HYDROCHLORIDE 5 MG: 5 TABLET, FILM COATED ORAL at 08:20

## 2024-02-26 RX ADMIN — ASPIRIN 81 MG: 81 TABLET, COATED ORAL at 08:20

## 2024-02-26 RX ADMIN — SODIUM CHLORIDE, PRESERVATIVE FREE 10 ML: 5 INJECTION INTRAVENOUS at 08:20

## 2024-02-26 RX ADMIN — Medication 2000 UNITS: at 08:20

## 2024-02-26 RX ADMIN — SODIUM CHLORIDE, PRESERVATIVE FREE 10 ML: 5 INJECTION INTRAVENOUS at 21:02

## 2024-02-26 RX ADMIN — FOLIC ACID 1 MG: 1 TABLET ORAL at 08:20

## 2024-02-26 RX ADMIN — ENOXAPARIN SODIUM 40 MG: 100 INJECTION SUBCUTANEOUS at 08:20

## 2024-02-26 RX ADMIN — AMLODIPINE BESYLATE 2.5 MG: 2.5 TABLET ORAL at 08:20

## 2024-02-26 RX ADMIN — TROSPIUM CHLORIDE 20 MG: 20 TABLET, FILM COATED ORAL at 16:25

## 2024-02-26 RX ADMIN — OXYBUTYNIN CHLORIDE 5 MG: 5 TABLET ORAL at 08:20

## 2024-02-26 RX ADMIN — METOPROLOL SUCCINATE 25 MG: 25 TABLET, EXTENDED RELEASE ORAL at 08:20

## 2024-02-26 RX ADMIN — GLIPIZIDE 5 MG: 5 TABLET ORAL at 06:47

## 2024-02-26 RX ADMIN — GLIPIZIDE 5 MG: 5 TABLET ORAL at 16:25

## 2024-02-26 RX ADMIN — ALOGLIPTIN 12.5 MG: 12.5 TABLET, FILM COATED ORAL at 12:25

## 2024-02-26 RX ADMIN — ATORVASTATIN CALCIUM 10 MG: 10 TABLET, FILM COATED ORAL at 21:02

## 2024-02-26 ASSESSMENT — PAIN SCALES - GENERAL
PAINLEVEL_OUTOF10: 0
PAINLEVEL_OUTOF10: 0

## 2024-02-26 NOTE — ACP (ADVANCE CARE PLANNING)
Advance Care Planning   Healthcare Decision Maker:    Primary Decision Maker: Talon Henao - [facility] - [phone]    Jessica Campos RN,CM.

## 2024-02-26 NOTE — PROGRESS NOTES
Pt requested medication for sleep -- states she sometimes takes melatonin at home. Educated pt that a sleep aide at this time is not recommended and could disrupt sleeping pattern further if asleep during the day; will notify provider of request for an addition to PRN's to take tonight if needed.  Pt agreeable; verbalized understanding.  Notified ELYSIA Hickman.  and updated on pending med-rec when  present for review and received new order for 3 mg of Melatonin nightly PRN for sleep.

## 2024-02-26 NOTE — H&P
50 minutes was spent in evaluating the patient, review of records and results, discussion with staff/family, etc.    Stacey Sim,    9:00 AM  2/26/2024

## 2024-02-26 NOTE — CARE COORDINATION
2/26/2024  Social Work Discharge Planning: Difficulty walking.This worker met with Pt to discuss  role and transition of care/discharge planning.Pt is from home with her spouse and uses a ww. [facility] and EV[facility]. Pt is refusing and prefers to return home with [facility].HH order is in. SW made a referral to [facility]. They are following. Hx with [facility].Electronically signed by JERMAINE Egan on 2/26/2024 at 1:22 PM

## 2024-02-26 NOTE — PROGRESS NOTES
OCCUPATIONAL THERAPY INITIAL EVALUATION    [facility]   [address] [facility]        Date:2024                                                  Patient Name: Marcela Henao    MRN: [id number]    : 1935    Room: [address] [place] [address]     Evaluating OT: Bernadette Mahoney OTR/L #[id number]    Referring Provider:  Lexi Gallardo APRN - CNP     Specific Provider Orders/Date:  OT Eval and Treat , 2024     Diagnosis:   1. Frequent falls    2. Difficulty in walking    3. General weakness         Surgery: None       Pertinent Medical History: DM, HTN, CVA     Precautions:  Fall Risk, falls and alarm      Assessment of current deficits    [x] Functional mobility  [x]ADLs  [x] Strength               []Cognition    [x] Functional transfers   [x] IADLs         [x] Safety Awareness   [x]Endurance    [x] Fine Coordination              [x] Balance      [] Vision/perception   []Sensation     []Gross Motor Coordination  [] ROM  [] Delirium                   [] Motor Control     OT PLAN OF CARE   OT POC based on physician orders, patient diagnosis and results of clinical assessment    Frequency/Duration 1-3 days/wk for 2 weeks PRN     Specific OT Treatment Interventions to include:   * Instruction/training on adapted ADL techniques and AE recommendations to increase functional independence within precautions       * Training on energy conservation strategies, correct breathing pattern and techniques to improve independence/tolerance for self-care routine  * Functional transfer/mobility training/DME recommendations for increased independence, safety, and fall prevention  * Patient/Family education to increase follow through with safety techniques and functional independence  * Recommendation of environmental modifications for increased safety with functional transfers/mobility and ADLs  * Cognitive retraining/development of therapeutic activities to improve problem

## 2024-02-26 NOTE — PROGRESS NOTES
Physical Therapy  Facility/Department: [address] MED SURG/TELE  Physical Therapy Initial Assessment    Name: Marcela Henao  : 1935  MRN: [id number]  Date of Service: 2024    Attending Provider:  Stacey Sim DO    Evaluating PT:  Saturnino Rainey Jr., P.T.    [facility]  Diagnosis:  Difficulty in walking [R26.2], has recently had multiple falls at home.   Pertinent PMHx/PSHx:  CVA, pt states her legs give out without warning and this is the cause of her falls at home.    Imaging: X-rays and CT scans this admission were negative for acute findings.  Precautions:  falls, bed/chair alarm    SUBJECTIVE:    Pt lives with her  in a 2 story home with 2 stairs and 2 rails to enter.  Her bed and bath are on the first floor.  Pt ambulated with a ww PTA.  She has a WC if needed.     OBJECTIVE:   Initial Evaluation  Date: 24 Treatment Short Term/ Long Term   Goals   Was pt agreeable to Eval/treatment? yes     Does pt have pain? C/o L knee pain     Bed Mobility  Rolling: SBA  Supine to sit: SBA  Sit to supine: SBA  Scooting: SBA  supervision   Transfers Sit to stand: MOD A  Stand to sit: MOD A  Stand pivot: MOD A with ww  SBA   Ambulation   15 feet with ww MOD A  100 feet with ww CGA   Stair negotiation: ascended and descended NA  2 steps with 2 rails CGA   AM-PAC 6 Clicks        BLE ROM is WFL.   BLE strength is grossly 4-/5 to 4/5.   Sensation:  Pt denies numbness and tingling to extremities  Balance: sitting is supervision and standing with ww is MIN A/MOD A as she fatigued.   Endurance: fair-    Patient education  Pt educated on gait with ww and hand placement during transfers.     Patient response to education:   Pt verbalized understanding Pt demonstrated skill Pt requires further education in this area   yes Yes with VCs and assist yes     ASSESSMENT:    Conditions Requiring Skilled Therapeutic Intervention:    [x]Decreased strength     []Decreased ROM  [x]Decreased functional

## 2024-02-26 NOTE — ED PROVIDER NOTES
likely placement for acute rehab.  Results and plan were discussed with the patient and her son; they voiced understanding and are amenable.  Medicine consulted for admission, patient is accepted.      While not exhaustive, the following diagnoses and their severity were considered: FTT adult, general weakness, bony injury, ICH, dehydration, electrolyte abnormality, UTI.      Independent interpretation of Laboratory tests by Baylee Martin DO: Troponins are 17 and 15, delta -2.  CMP stable/generally unremarkable.  CBC unremarkable.  Magnesium, lactic, lipase WNLs.  UA unremarkable.  Rapid COVID is negative.    Independent interpretation of Radiology tests by Baylee Martin DO: CT head and cervical spine show no acute abnormalities.  CXR, XR left hip and bilateral pelvis show no acute abnormality.        Non-plain film images such as CT, Xray, Ultrasound and MRI are read by the radiologist. Plain radiographic images are visualized and preliminarily interpreted by the ED Provider with the above-noted findings. Interpretation per the Radiologist below, if available at the time of this note are included below.      EKG reviewed and interpreted by me, TIME 1619:  This EKG is signed by Baylee masters DO.     Sinus rhythm with first-degree AVB, LAD, LBBB, no ST elevations, rate 67, QTc 511; compared to previous EKG from 12/6/2023, does not appear significantly changed.    All labs & imaging were reviewed and interpreted by me, see RESULTS. I have personally reviewed all laboratory and imaging results for this patient.       Are there any additional factors to consider that affect care (uninsured, homeless, illiterate, history from another source, etc.) (If yes, which ones).  No       This patient's ED course included: a personal history and physicial examination, re-evaluation prior to disposition, multiple bedside re-evaluations, IV medications, cardiac monitoring, continuous pulse oximetry, and

## 2024-02-26 NOTE — PROGRESS NOTES
SPIRITUAL HEALTH SERVICES - KAYLA Rodrigues Encounter    Name: Marcela Henao                  Referral: Routine Visit    Sacraments  Anointed (Last Rites): Yes  Apostolic [place]: No  Confession: No  Communion: Declined     Assessment:  Patient receptive to  visit.      Intervention:   provided spiritual support and sacramental ministry for patient.     Outcome:  Patient expressed gratitude for visit.    Plan:  Chaplains will remain available to offer spiritual and emotional support as needed.      Electronically signed by Chaplain Neli, on 2/26/2024 at 4:30 PM.  Spiritual Care Department  [facility]  [phone]

## 2024-02-26 NOTE — PROGRESS NOTES
Attempted to complete home med-rec at this time. Pt states she has no idea what her meds are and her  (Talon) manages all of her pills.  Her son (Heriberto) present at bedside on admission to unit; unable to assist with med-rec but does state that they were reviewed with the EMS paramedic and in ER and stated his mother was admitted a few months ago -- pt states there hasn't been any changes to her meds since the last time she had been admitted.  Medications will need to be reviewed with spouse; per son - Talon will be visiting in the morning and will ask his father to bring in ACP docs.

## 2024-02-26 NOTE — PLAN OF CARE
Problem: Safety - Adult  Goal: Free from fall injury  2/26/2024 1401 by Francia Paredes, RN  Outcome: Progressing  2/26/2024 0556 by Diamond Jones, RN  Outcome: Progressing     Problem: Pain  Goal: Verbalizes/displays adequate comfort level or baseline comfort level  Outcome: Progressing

## 2024-02-27 VITALS
HEART RATE: 88 BPM | WEIGHT: 183.8 LBS | SYSTOLIC BLOOD PRESSURE: 147 MMHG | RESPIRATION RATE: 18 BRPM | OXYGEN SATURATION: 98 % | HEIGHT: 66 IN | BODY MASS INDEX: 29.54 KG/M2 | DIASTOLIC BLOOD PRESSURE: 72 MMHG | TEMPERATURE: 98.1 F

## 2024-02-27 LAB
ANION GAP SERPL CALCULATED.3IONS-SCNC: 10 MMOL/L (ref 7–16)
BASOPHILS # BLD: 0 K/UL (ref 0–0.2)
BASOPHILS NFR BLD: 0 % (ref 0–2)
BUN SERPL-MCNC: 35 MG/DL (ref 6–23)
CALCIUM SERPL-MCNC: 10.4 MG/DL (ref 8.6–10.2)
CHLORIDE SERPL-SCNC: 102 MMOL/L (ref 98–107)
CO2 SERPL-SCNC: 25 MMOL/L (ref 22–29)
CREAT SERPL-MCNC: 1.4 MG/DL (ref 0.5–1)
EOSINOPHIL # BLD: 0 K/UL (ref 0.05–0.5)
EOSINOPHILS RELATIVE PERCENT: 0 % (ref 0–6)
ERYTHROCYTE [DISTWIDTH] IN BLOOD BY AUTOMATED COUNT: 13.1 % (ref 11.5–15)
GFR SERPL CREATININE-BSD FRML MDRD: 35 ML/MIN/1.73M2
GLUCOSE SERPL-MCNC: 288 MG/DL (ref 74–99)
HCT VFR BLD AUTO: 40.4 % (ref 34–48)
HGB BLD-MCNC: 13.3 G/DL (ref 11.5–15.5)
LYMPHOCYTES NFR BLD: 0.63 K/UL (ref 1.5–4)
LYMPHOCYTES RELATIVE PERCENT: 7 % (ref 20–42)
MCH RBC QN AUTO: 30.5 PG (ref 26–35)
MCHC RBC AUTO-ENTMCNC: 32.9 G/DL (ref 32–34.5)
MCV RBC AUTO: 92.7 FL (ref 80–99.9)
MONOCYTES NFR BLD: 0.23 K/UL (ref 0.1–0.95)
MONOCYTES NFR BLD: 3 % (ref 2–12)
NEUTROPHILS NFR BLD: 90 % (ref 43–80)
NEUTS SEG NFR BLD: 8.14 K/UL (ref 1.8–7.3)
PLATELET # BLD AUTO: 284 K/UL (ref 130–450)
PMV BLD AUTO: 9.9 FL (ref 7–12)
POTASSIUM SERPL-SCNC: 4.6 MMOL/L (ref 3.5–5)
RBC # BLD AUTO: 4.36 M/UL (ref 3.5–5.5)
RBC # BLD: ABNORMAL 10*6/UL
SODIUM SERPL-SCNC: 137 MMOL/L (ref 132–146)
WBC OTHER # BLD: 9 K/UL (ref 4.5–11.5)

## 2024-02-27 PROCEDURE — 2580000003 HC RX 258: Performed by: NURSE PRACTITIONER

## 2024-02-27 PROCEDURE — 6360000002 HC RX W HCPCS: Performed by: NURSE PRACTITIONER

## 2024-02-27 PROCEDURE — G0378 HOSPITAL OBSERVATION PER HR: HCPCS

## 2024-02-27 PROCEDURE — 80048 BASIC METABOLIC PNL TOTAL CA: CPT

## 2024-02-27 PROCEDURE — 36415 COLL VENOUS BLD VENIPUNCTURE: CPT

## 2024-02-27 PROCEDURE — 6370000000 HC RX 637 (ALT 250 FOR IP): Performed by: NURSE PRACTITIONER

## 2024-02-27 PROCEDURE — 85025 COMPLETE CBC W/AUTO DIFF WBC: CPT

## 2024-02-27 PROCEDURE — 96372 THER/PROPH/DIAG INJ SC/IM: CPT

## 2024-02-27 PROCEDURE — 6370000000 HC RX 637 (ALT 250 FOR IP): Performed by: FAMILY MEDICINE

## 2024-02-27 RX ORDER — TROSPIUM CHLORIDE 20 MG/1
20 TABLET, FILM COATED ORAL
Qty: 60 TABLET | Refills: 3 | DISCHARGE
Start: 2024-02-27

## 2024-02-27 RX ORDER — HYDROCHLOROTHIAZIDE 25 MG/1
25 TABLET ORAL DAILY
Status: DISCONTINUED | OUTPATIENT
Start: 2024-02-28 | End: 2024-02-27 | Stop reason: HOSPADM

## 2024-02-27 RX ORDER — GLUCAGON 1 MG/ML
1 KIT INJECTION PRN
Status: DISCONTINUED | OUTPATIENT
Start: 2024-02-27 | End: 2024-02-27 | Stop reason: HOSPADM

## 2024-02-27 RX ORDER — DEXTROSE MONOHYDRATE 100 MG/ML
INJECTION, SOLUTION INTRAVENOUS CONTINUOUS PRN
Status: DISCONTINUED | OUTPATIENT
Start: 2024-02-27 | End: 2024-02-27 | Stop reason: HOSPADM

## 2024-02-27 RX ORDER — DEXAMETHASONE 4 MG/1
4 TABLET ORAL
Qty: 10 TABLET | Refills: 0 | DISCHARGE
Start: 2024-02-27 | End: 2024-03-08

## 2024-02-27 RX ORDER — METOPROLOL SUCCINATE 50 MG/1
50 TABLET, EXTENDED RELEASE ORAL DAILY
Status: DISCONTINUED | OUTPATIENT
Start: 2024-02-28 | End: 2024-02-27 | Stop reason: HOSPADM

## 2024-02-27 RX ORDER — INSULIN LISPRO 100 [IU]/ML
0-4 INJECTION, SOLUTION INTRAVENOUS; SUBCUTANEOUS NIGHTLY
Status: DISCONTINUED | OUTPATIENT
Start: 2024-02-27 | End: 2024-02-27 | Stop reason: HOSPADM

## 2024-02-27 RX ORDER — INSULIN LISPRO 100 [IU]/ML
0-16 INJECTION, SOLUTION INTRAVENOUS; SUBCUTANEOUS
Status: DISCONTINUED | OUTPATIENT
Start: 2024-02-27 | End: 2024-02-27 | Stop reason: HOSPADM

## 2024-02-27 RX ORDER — HYDROCHLOROTHIAZIDE 25 MG/1
25 TABLET ORAL DAILY
Qty: 30 TABLET | Refills: 3 | DISCHARGE
Start: 2024-02-28

## 2024-02-27 RX ADMIN — Medication 2000 UNITS: at 08:48

## 2024-02-27 RX ADMIN — FOLIC ACID 1 MG: 1 TABLET ORAL at 08:49

## 2024-02-27 RX ADMIN — ASPIRIN 81 MG: 81 TABLET, COATED ORAL at 08:48

## 2024-02-27 RX ADMIN — ENOXAPARIN SODIUM 40 MG: 100 INJECTION SUBCUTANEOUS at 08:49

## 2024-02-27 RX ADMIN — HYDROCHLOROTHIAZIDE 12.5 MG: 12.5 TABLET ORAL at 08:48

## 2024-02-27 RX ADMIN — CHOLESTYRAMINE 4 G: 4 POWDER, FOR SUSPENSION ORAL at 08:49

## 2024-02-27 RX ADMIN — GLIPIZIDE 5 MG: 5 TABLET ORAL at 05:43

## 2024-02-27 RX ADMIN — TROSPIUM CHLORIDE 20 MG: 20 TABLET, FILM COATED ORAL at 05:43

## 2024-02-27 RX ADMIN — DONEPEZIL HYDROCHLORIDE 5 MG: 5 TABLET, FILM COATED ORAL at 08:48

## 2024-02-27 RX ADMIN — ALOGLIPTIN 12.5 MG: 12.5 TABLET, FILM COATED ORAL at 08:48

## 2024-02-27 RX ADMIN — FLUOXETINE 40 MG: 20 CAPSULE ORAL at 08:48

## 2024-02-27 RX ADMIN — METOPROLOL SUCCINATE 25 MG: 25 TABLET, EXTENDED RELEASE ORAL at 08:49

## 2024-02-27 RX ADMIN — CLOPIDOGREL BISULFATE 75 MG: 75 TABLET ORAL at 08:49

## 2024-02-27 RX ADMIN — SODIUM CHLORIDE, PRESERVATIVE FREE 10 ML: 5 INJECTION INTRAVENOUS at 08:51

## 2024-02-27 NOTE — PROGRESS NOTES
[facility]   Progress note      Subjective:    The patient is awake and alert.  Sitting up in bed. Issues w/ agitation overnight. Trying to get up on her own.   No acute events overnight.    Denies chest pain, angina, SOB     Objective:    BP (!) 160/83   Pulse 88   Temp 98.1 °F (36.7 °C) (Oral)   Resp 18   Ht 1.676 m (5' 6\")   Wt 83.4 kg (183 lb 12.8 oz)   SpO2 98%   BMI 29.67 kg/m²     In: -   Out: 700   In: -   Out: 700 [Urine:700]    General appearance: NAD, conversant  HEENT: AT/NC, MMM  Neck: FROM, supple  Lungs: Clear to auscultation  CV: RRR, no MRGs  Vasc: Radial pulses 2+  Abdomen: Soft, non-tender; no masses or HSM  Extremities: No peripheral edema or digital cyanosis  Skin: no rash, lesions or ulcers  Psych: Alert and oriented to person, place and time  Neuro: Alert and interactive     Recent Labs     02/25/24  1528 02/26/24  0426 02/27/24  0339   WBC 11.1 8.9 9.0   HGB 13.0 12.8 13.3   HCT 40.1 39.9 40.4    266 284       Recent Labs     02/25/24  1528 02/26/24  0426 02/27/24  0339    139 137   K 3.8 3.8 4.6    105 102   CO2 27 26 25   BUN 25* 24* 35*   CREATININE 1.2* 1.1* 1.4*   CALCIUM 9.6 9.5 10.4*       Assessment:    Principal Problem:    Difficulty in walking  Active Problems:    Frequent falls  Resolved Problems:    * No resolved hospital problems. *      Plan:    Frequent falls at home  --check lumbar spine xray--> pt w/ severe lumbar degen dx, start decadron  --check orthostatics, very pos orthos from sitting to standing, switch ditropan/oxybutynin and norvasc to alternatives  --pt/ot  --fall risk precautions while here  --ct head neg  --discharge plan for kody? Will await discussion with case management  --pt is on ditropan and norvasc, both of which can cause orthostatics, may need to change/adjust if orthos are pos  --roz is resolving, will continue to monitor     2/27/2024  --stopped norvas d/t orthostatics; adjusted metoprolol and hctz to

## 2024-02-27 NOTE — DISCHARGE INSTR - COC
Continuity of Care Form    Patient Name: Marcela Henao   :  1935  MRN:  [id number]    Admit date:  2024  Discharge date:  /    Code Status Order: Full Code   Advance Directives:     Admitting Physician:  Brenda Muse MD  PCP: Jatinder Marquez MD    Discharging Nurse: Francia  Discharging Hospital Unit/Room#: 0535/0535-A  Discharging Unit Phone Number: [phone]    Emergency Contact:   Extended Emergency Contact Information  Primary Emergency Contact: Talon Henao  Address: [address] [place] [address] [place] [address] [facility]  Home Phone: [phone]  Relation: Spouse  Secondary Emergency Contact: Heriberto Henao  Mobile Phone: [phone]  Relation: Child    Past Surgical History:  Past Surgical History:   Procedure Laterality Date     SECTION      CHOLECYSTECTOMY      COLONOSCOPY      EYE SURGERY      bilateral w/ implants    HYSTERECTOMY (CERVIX STATUS UNKNOWN)         Immunization History:   Immunization History   Administered Date(s) Administered    COVID-19, PFIZER PURPLE top, DILUTE for use, (age 12 y+), 30mcg/0.3mL 2021, 02/15/2021, 10/19/2021       Active Problems:  Patient Active Problem List   Diagnosis Code    Diabetes mellitus type 2, uncontrolled [id number]    Essential hypertension, benign I10    Hyperlipidemia with target LDL less than 100 E78.5    Syncope and collapse R55    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

## 2024-02-27 NOTE — PROGRESS NOTES
Nurse to nurse report called to Carmencita at [facility]. Nito to transport at 2:00p

## 2024-02-27 NOTE — PLAN OF CARE
Problem: Discharge Planning  Goal: Discharge to home or other facility with appropriate resources  Outcome: Progressing  Flowsheets (Taken 2/26/2024 2211)  Discharge to home or other facility with appropriate resources: Refer to discharge planning if patient needs post-hospital services based on physician order or complex needs related to functional status, cognitive ability or social support system     Problem: Safety - Adult  Goal: Free from fall injury  2/26/2024 2217 by Diamond Craig, RN  Outcome: Progressing  2/26/2024 1401 by Francia Paredes, RN  Outcome: Progressing     Problem: Pain  Goal: Verbalizes/displays adequate comfort level or baseline comfort level  2/26/2024 2217 by Diamond Craig, RN  Outcome: Progressing  2/26/2024 1401 by Francia Paredes, RN  Outcome: Progressing

## 2024-02-27 NOTE — CARE COORDINATION
2/27/2024  Social Work Discharge Planning:OBS. [facility] 14/24. Knee pain.Pt is refusing and prefers to return home with [facility].HHC order is in. SW made a referral to [facility]-they are following. Pt is from home with her spouse and uses a ww . Electronically signed by JERMAINE Egan on 2/27/2024 at 8:54 AM    2/27/2024  Social Work Discharge Planning: Pt is now agreeable to go to a [facility] and chose [facility] . Referral was made. Waiting reply. Electronically signed by JERMAINE Egan on 2/27/2024 at 9:37 AM      2/27/2024Social Work Discharge Planning:[facility] accepts and is starting precert. LYNETTE,PASARR and transport form are completed. Electronically signed by JERMAINE Egan on 2/27/2024 at 10:00 AM      2/27/2024  Social Work Discharge Planning:AUTH RECEIVED FOR PT TO GO TO [facility]. SW SET UP [facility] TRANSPORT VIA [facility] FOR PT TO GO AT 2 PM TODAY. NURSE HERE, [facility] LIAISON AND SON KARI WERE NOTIFIED. Electronically signed by JERMAINE Egan on 2/27/2024 at 12:40 PM

## 2024-02-27 NOTE — DISCHARGE SUMMARY
[facility]   Discharge summary   Patient ID:  Marcela Henao  [id number]  88 y.o.  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 female with PMHx of HLD, HTN, prior history of stroke without residual deficits, frequent falls, who presented to the ER on 2/25/2024 for evaluation of frequent falls, generalized weakness and fatigue. patient resides at home.  and patient provide hx. They state that she got out of bed the morning of presentation and her legs gave out from underneath her; states that she has issues with pain of her left knee and it causes difficulty with ambulating, she is supposed to use a walker at baseline.  Son is also at bedside.  They state that the patient fell 2/23/2024 as well and required assistance off the ground by her son.  She also fell on Thursday.  She has not had any fevers or chills.  Denies any numbness or extremity weakness.  Just reports feeling weak overall.  Patient's son states that this has happened to her frequently over the past few months, she was admitted most recently at [facility] after she had general weakness and a UTI.  She got physical therapy and seemed to improve, was discharged to home but over the past couple of months has

## 2024-02-28 ENCOUNTER — OUTSIDE SERVICES (OUTPATIENT)
Dept: PRIMARY CARE CLINIC | Age: 89
End: 2024-02-28
Payer: MEDICARE

## 2024-02-28 DIAGNOSIS — N17.9 AKI (ACUTE KIDNEY INJURY) (HCC): ICD-10-CM

## 2024-02-28 DIAGNOSIS — F03.90 DEMENTIA, UNSPECIFIED DEMENTIA SEVERITY, UNSPECIFIED DEMENTIA TYPE, UNSPECIFIED WHETHER BEHAVIORAL, PSYCHOTIC, OR MOOD DISTURBANCE OR ANXIETY (HCC): ICD-10-CM

## 2024-02-28 DIAGNOSIS — Z91.81 AT MAXIMUM RISK FOR FALL: ICD-10-CM

## 2024-02-28 DIAGNOSIS — R26.2 AMBULATORY DYSFUNCTION: ICD-10-CM

## 2024-02-28 DIAGNOSIS — R53.1 GENERAL WEAKNESS: ICD-10-CM

## 2024-02-28 DIAGNOSIS — R53.1 GENERALIZED WEAKNESS: ICD-10-CM

## 2024-02-28 DIAGNOSIS — R29.6 FREQUENT FALLS: ICD-10-CM

## 2024-02-28 DIAGNOSIS — R26.2 DIFFICULTY IN WALKING: ICD-10-CM

## 2024-02-28 DIAGNOSIS — M51.36 LUMBAR DEGENERATIVE DISC DISEASE: ICD-10-CM

## 2024-02-28 DIAGNOSIS — R29.6 FALLS FREQUENTLY: Primary | ICD-10-CM

## 2024-02-28 DIAGNOSIS — R53.81 PHYSICAL DECONDITIONING: ICD-10-CM

## 2024-02-28 LAB
ALBUMIN SERPL-MCNC: 3.5 G/DL (ref 3.5–5.2)
ALP SERPL-CCNC: 68 U/L (ref 35–104)
ALT SERPL-CCNC: 11 U/L (ref 0–32)
ANION GAP SERPL CALCULATED.3IONS-SCNC: 15 MMOL/L (ref 7–16)
AST SERPL-CCNC: 16 U/L (ref 0–31)
BILIRUB SERPL-MCNC: 0.5 MG/DL (ref 0–1.2)
BUN SERPL-MCNC: 41 MG/DL (ref 6–23)
CALCIUM SERPL-MCNC: 9.7 MG/DL (ref 8.6–10.2)
CHLORIDE SERPL-SCNC: 105 MMOL/L (ref 98–107)
CHOLEST SERPL-MCNC: 188 MG/DL
CO2 SERPL-SCNC: 23 MMOL/L (ref 22–29)
CREAT SERPL-MCNC: 1.4 MG/DL (ref 0.5–1)
ERYTHROCYTE [DISTWIDTH] IN BLOOD BY AUTOMATED COUNT: 13.3 % (ref 11.5–15)
GFR SERPL CREATININE-BSD FRML MDRD: 38 ML/MIN/1.73M2
GLUCOSE SERPL-MCNC: 69 MG/DL (ref 74–99)
HBA1C MFR BLD: 5.8 % (ref 4–5.6)
HCT VFR BLD AUTO: 40.3 % (ref 34–48)
HDLC SERPL-MCNC: 43 MG/DL
HGB BLD-MCNC: 13.1 G/DL (ref 11.5–15.5)
LDLC SERPL CALC-MCNC: 126 MG/DL
MCH RBC QN AUTO: 30.4 PG (ref 26–35)
MCHC RBC AUTO-ENTMCNC: 32.5 G/DL (ref 32–34.5)
MCV RBC AUTO: 93.5 FL (ref 80–99.9)
PLATELET # BLD AUTO: 304 K/UL (ref 130–450)
PMV BLD AUTO: 10 FL (ref 7–12)
POTASSIUM SERPL-SCNC: 3.6 MMOL/L (ref 3.5–5)
PROT SERPL-MCNC: 6 G/DL (ref 6.4–8.3)
RBC # BLD AUTO: 4.31 M/UL (ref 3.5–5.5)
SODIUM SERPL-SCNC: 143 MMOL/L (ref 132–146)
TRIGL SERPL-MCNC: 93 MG/DL
VLDLC SERPL CALC-MCNC: 19 MG/DL
WBC OTHER # BLD: 11.5 K/UL (ref 4.5–11.5)

## 2024-02-28 PROCEDURE — 99305 1ST NF CARE MODERATE MDM 35: CPT | Performed by: STUDENT IN AN ORGANIZED HEALTH CARE EDUCATION/TRAINING PROGRAM

## 2024-02-28 NOTE — PROGRESS NOTES
Marcela Henao (:  1935) is a 88 y.o. female.    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   Social History     Socioeconomic History    Marital status:    Tobacco Use    Smoking status: Former     Current packs/day: 0.00     Average packs/day: 2.0 packs/day for 30.0 years (60.0 ttl pk-yrs)     Types: Cigarettes     Start date: 10/3/1955     Quit date: 10/3/1985     Years since quittin.4    Smokeless tobacco: Never   Substance and Sexual Activity    Alcohol use: Yes     Comment: rarely    Drug use: No     Social Determinants of Health     Food Insecurity: No Food Insecurity (2024)    Hunger Vital Sign     Worried About Running Out of Food in the Last Year: Never true     Ran Out of Food in the Last Year: Never true   Transportation Needs: No Transportation Needs (2024)    PRAPARE - Transportation     Lack of Transportation (Medical): No     Lack of Transportation (Non-Medical): No   Housing Stability: Low Risk  (2024)    Housing Stability Vital Sign     Unable to Pay for Housing in the Last Year: No     Number of Places Lived in the Last Year: 1     Unstable Housing in the Last Year: No        HPI    Marcela Henao is a pleasant and cooperative, however confused 88-year-old female.  Hospital course discussed in brief.  Patient is unsure of why she got admitted thus I advise she got admitted for falling at home and fatigue.  Currently, patient states that she is still having fatigue

## 2024-03-06 LAB
ALBUMIN SERPL-MCNC: 3.5 G/DL (ref 3.5–5.2)
ALP SERPL-CCNC: 66 U/L (ref 35–104)
ALT SERPL-CCNC: 18 U/L (ref 0–32)
ANION GAP SERPL CALCULATED.3IONS-SCNC: 14 MMOL/L (ref 7–16)
AST SERPL-CCNC: 15 U/L (ref 0–31)
BILIRUB SERPL-MCNC: 0.7 MG/DL (ref 0–1.2)
BUN SERPL-MCNC: 53 MG/DL (ref 6–23)
CALCIUM SERPL-MCNC: 9.4 MG/DL (ref 8.6–10.2)
CHLORIDE SERPL-SCNC: 101 MMOL/L (ref 98–107)
CO2 SERPL-SCNC: 25 MMOL/L (ref 22–29)
CREAT SERPL-MCNC: 1.6 MG/DL (ref 0.5–1)
ERYTHROCYTE [DISTWIDTH] IN BLOOD BY AUTOMATED COUNT: 13.3 % (ref 11.5–15)
GFR SERPL CREATININE-BSD FRML MDRD: 32 ML/MIN/1.73M2
GLUCOSE SERPL-MCNC: 108 MG/DL (ref 74–99)
HCT VFR BLD AUTO: 40.8 % (ref 34–48)
HGB BLD-MCNC: 13.4 G/DL (ref 11.5–15.5)
MCH RBC QN AUTO: 30 PG (ref 26–35)
MCHC RBC AUTO-ENTMCNC: 32.8 G/DL (ref 32–34.5)
MCV RBC AUTO: 91.5 FL (ref 80–99.9)
PLATELET # BLD AUTO: 378 K/UL (ref 130–450)
PMV BLD AUTO: 10 FL (ref 7–12)
POTASSIUM SERPL-SCNC: 3.3 MMOL/L (ref 3.5–5)
PROT SERPL-MCNC: 5.9 G/DL (ref 6.4–8.3)
RBC # BLD AUTO: 4.46 M/UL (ref 3.5–5.5)
SODIUM SERPL-SCNC: 140 MMOL/L (ref 132–146)
WBC OTHER # BLD: 15 K/UL (ref 4.5–11.5)

## 2024-03-08 LAB
ANION GAP SERPL CALCULATED.3IONS-SCNC: 8 MMOL/L (ref 7–16)
BUN SERPL-MCNC: 55 MG/DL (ref 6–23)
CALCIUM SERPL-MCNC: 9.3 MG/DL (ref 8.6–10.2)
CHLORIDE SERPL-SCNC: 104 MMOL/L (ref 98–107)
CO2 SERPL-SCNC: 27 MMOL/L (ref 22–29)
CREAT SERPL-MCNC: 1.7 MG/DL (ref 0.5–1)
ERYTHROCYTE [DISTWIDTH] IN BLOOD BY AUTOMATED COUNT: 13.4 % (ref 11.5–15)
GFR SERPL CREATININE-BSD FRML MDRD: 30 ML/MIN/1.73M2
GLUCOSE SERPL-MCNC: 105 MG/DL (ref 74–99)
HCT VFR BLD AUTO: 39.9 % (ref 34–48)
HGB BLD-MCNC: 13 G/DL (ref 11.5–15.5)
MCH RBC QN AUTO: 30.2 PG (ref 26–35)
MCHC RBC AUTO-ENTMCNC: 32.6 G/DL (ref 32–34.5)
MCV RBC AUTO: 92.6 FL (ref 80–99.9)
PLATELET # BLD AUTO: 362 K/UL (ref 130–450)
PMV BLD AUTO: 9.9 FL (ref 7–12)
POTASSIUM SERPL-SCNC: 4.1 MMOL/L (ref 3.5–5)
RBC # BLD AUTO: 4.31 M/UL (ref 3.5–5.5)
SODIUM SERPL-SCNC: 139 MMOL/L (ref 132–146)
WBC OTHER # BLD: 16.1 K/UL (ref 4.5–11.5)

## 2024-03-13 LAB
ALBUMIN SERPL-MCNC: 3.1 G/DL (ref 3.5–5.2)
ALP SERPL-CCNC: 62 U/L (ref 35–104)
ALT SERPL-CCNC: 19 U/L (ref 0–32)
ANION GAP SERPL CALCULATED.3IONS-SCNC: 11 MMOL/L (ref 7–16)
AST SERPL-CCNC: 19 U/L (ref 0–31)
BILIRUB SERPL-MCNC: 0.8 MG/DL (ref 0–1.2)
BUN SERPL-MCNC: 27 MG/DL (ref 6–23)
CALCIUM SERPL-MCNC: 9.5 MG/DL (ref 8.6–10.2)
CHLORIDE SERPL-SCNC: 105 MMOL/L (ref 98–107)
CO2 SERPL-SCNC: 23 MMOL/L (ref 22–29)
CREAT SERPL-MCNC: 1.3 MG/DL (ref 0.5–1)
ERYTHROCYTE [DISTWIDTH] IN BLOOD BY AUTOMATED COUNT: 13.4 % (ref 11.5–15)
GFR SERPL CREATININE-BSD FRML MDRD: 38 ML/MIN/1.73M2
GLUCOSE SERPL-MCNC: 75 MG/DL (ref 74–99)
HCT VFR BLD AUTO: 38.6 % (ref 34–48)
HGB BLD-MCNC: 12.6 G/DL (ref 11.5–15.5)
MCH RBC QN AUTO: 30.2 PG (ref 26–35)
MCHC RBC AUTO-ENTMCNC: 32.6 G/DL (ref 32–34.5)
MCV RBC AUTO: 92.6 FL (ref 80–99.9)
PLATELET # BLD AUTO: 247 K/UL (ref 130–450)
PMV BLD AUTO: 10.3 FL (ref 7–12)
POTASSIUM SERPL-SCNC: 4.5 MMOL/L (ref 3.5–5)
PROT SERPL-MCNC: 5.5 G/DL (ref 6.4–8.3)
RBC # BLD AUTO: 4.17 M/UL (ref 3.5–5.5)
SODIUM SERPL-SCNC: 139 MMOL/L (ref 132–146)
WBC OTHER # BLD: 9 K/UL (ref 4.5–11.5)

## 2024-03-25 RX ORDER — CLOPIDOGREL BISULFATE 75 MG/1
75 TABLET ORAL DAILY
Qty: 14 TABLET | OUTPATIENT
Start: 2024-03-25

## 2024-04-09 ENCOUNTER — APPOINTMENT (OUTPATIENT)
Dept: CT IMAGING | Age: 89
DRG: 281 | End: 2024-04-09
Payer: MEDICARE

## 2024-04-09 ENCOUNTER — HOSPITAL ENCOUNTER (INPATIENT)
Age: 89
LOS: 3 days | Discharge: NURSING, REHAB OR LONG-TERM CARE | DRG: 281 | End: 2024-04-12
Attending: EMERGENCY MEDICINE | Admitting: FAMILY MEDICINE
Payer: MEDICARE

## 2024-04-09 ENCOUNTER — APPOINTMENT (OUTPATIENT)
Dept: GENERAL RADIOLOGY | Age: 89
DRG: 281 | End: 2024-04-09
Payer: MEDICARE
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
## 2024-04-09 DIAGNOSIS — N17.9 AKI (ACUTE KIDNEY INJURY) (HCC): ICD-10-CM

## 2024-04-09 DIAGNOSIS — N30.00 ACUTE CYSTITIS WITHOUT HEMATURIA: ICD-10-CM

## 2024-04-09 DIAGNOSIS — R29.6 FREQUENT FALLS: ICD-10-CM

## 2024-04-09 DIAGNOSIS — I21.4 NSTEMI (NON-ST ELEVATED MYOCARDIAL INFARCTION) (HCC): Primary | ICD-10-CM

## 2024-04-09 LAB
ALBUMIN SERPL-MCNC: 3.8 G/DL (ref 3.5–5.2)
ALP SERPL-CCNC: 80 U/L (ref 35–104)
ALT SERPL-CCNC: 18 U/L (ref 0–32)
ANION GAP SERPL CALCULATED.3IONS-SCNC: 9 MMOL/L (ref 7–16)
AST SERPL-CCNC: 27 U/L (ref 0–31)
BACTERIA URNS QL MICRO: ABNORMAL
BASOPHILS # BLD: 0.03 K/UL (ref 0–0.2)
BASOPHILS NFR BLD: 0 % (ref 0–2)
BILIRUB SERPL-MCNC: 0.8 MG/DL (ref 0–1.2)
BILIRUB UR QL STRIP: NEGATIVE
BUN SERPL-MCNC: 25 MG/DL (ref 6–23)
CALCIUM SERPL-MCNC: 9.9 MG/DL (ref 8.6–10.2)
CHLORIDE SERPL-SCNC: 97 MMOL/L (ref 98–107)
CK SERPL-CCNC: 104 U/L (ref 20–180)
CLARITY UR: ABNORMAL
CO2 SERPL-SCNC: 30 MMOL/L (ref 22–29)
COLOR UR: YELLOW
CREAT SERPL-MCNC: 1.2 MG/DL (ref 0.5–1)
EKG ATRIAL RATE: 79 BPM
EKG P AXIS: 51 DEGREES
EKG P-R INTERVAL: 240 MS
EKG Q-T INTERVAL: 520 MS
EKG QRS DURATION: 138 MS
EKG QTC CALCULATION (BAZETT): 596 MS
EKG R AXIS: -24 DEGREES
EKG T AXIS: -87 DEGREES
EKG VENTRICULAR RATE: 79 BPM
EOSINOPHIL # BLD: 0 K/UL (ref 0.05–0.5)
EOSINOPHILS RELATIVE PERCENT: 0 % (ref 0–6)
ERYTHROCYTE [DISTWIDTH] IN BLOOD BY AUTOMATED COUNT: 13.6 % (ref 11.5–15)
GFR SERPL CREATININE-BSD FRML MDRD: 43 ML/MIN/1.73M2
GLUCOSE BLD-MCNC: 202 MG/DL (ref 74–99)
GLUCOSE SERPL-MCNC: 209 MG/DL (ref 74–99)
GLUCOSE UR STRIP-MCNC: NEGATIVE MG/DL
HCT VFR BLD AUTO: 46.8 % (ref 34–48)
HGB BLD-MCNC: 15 G/DL (ref 11.5–15.5)
HGB UR QL STRIP.AUTO: ABNORMAL
IMM GRANULOCYTES # BLD AUTO: 0.08 K/UL (ref 0–0.58)
IMM GRANULOCYTES NFR BLD: 1 % (ref 0–5)
KETONES UR STRIP-MCNC: NEGATIVE MG/DL
LEUKOCYTE ESTERASE UR QL STRIP: NEGATIVE
LYMPHOCYTES NFR BLD: 0.67 K/UL (ref 1.5–4)
LYMPHOCYTES RELATIVE PERCENT: 5 % (ref 20–42)
MCH RBC QN AUTO: 29.6 PG (ref 26–35)
MCHC RBC AUTO-ENTMCNC: 32.1 G/DL (ref 32–34.5)
MCV RBC AUTO: 92.3 FL (ref 80–99.9)
MONOCYTES NFR BLD: 1.16 K/UL (ref 0.1–0.95)
MONOCYTES NFR BLD: 9 % (ref 2–12)
NEUTROPHILS NFR BLD: 86 % (ref 43–80)
NEUTS SEG NFR BLD: 11.55 K/UL (ref 1.8–7.3)
NITRITE UR QL STRIP: NEGATIVE
PH UR STRIP: 6.5 [PH] (ref 5–9)
PLATELET # BLD AUTO: 276 K/UL (ref 130–450)
PMV BLD AUTO: 9.7 FL (ref 7–12)
POTASSIUM SERPL-SCNC: 3.7 MMOL/L (ref 3.5–5)
PROT SERPL-MCNC: 6.9 G/DL (ref 6.4–8.3)
PROT UR STRIP-MCNC: >=300 MG/DL
RBC # BLD AUTO: 5.07 M/UL (ref 3.5–5.5)
RBC #/AREA URNS HPF: ABNORMAL /HPF
SODIUM SERPL-SCNC: 136 MMOL/L (ref 132–146)
SP GR UR STRIP: >1.03 (ref 1–1.03)
TROPONIN I SERPL HS-MCNC: 173 NG/L (ref 0–9)
TROPONIN I SERPL HS-MCNC: 189 NG/L (ref 0–9)
TROPONIN I SERPL HS-MCNC: 197 NG/L (ref 0–9)
UROBILINOGEN UR STRIP-ACNC: 0.2 EU/DL (ref 0–1)
WBC #/AREA URNS HPF: ABNORMAL /HPF
WBC OTHER # BLD: 13.5 K/UL (ref 4.5–11.5)

## 2024-04-09 PROCEDURE — 80053 COMPREHEN METABOLIC PANEL: CPT

## 2024-04-09 PROCEDURE — 82550 ASSAY OF CK (CPK): CPT

## 2024-04-09 PROCEDURE — 6360000002 HC RX W HCPCS: Performed by: INTERNAL MEDICINE

## 2024-04-09 PROCEDURE — 73521 X-RAY EXAM HIPS BI 2 VIEWS: CPT

## 2024-04-09 PROCEDURE — 72125 CT NECK SPINE W/O DYE: CPT

## 2024-04-09 PROCEDURE — 72131 CT LUMBAR SPINE W/O DYE: CPT

## 2024-04-09 PROCEDURE — 2580000003 HC RX 258: Performed by: NURSE PRACTITIONER

## 2024-04-09 PROCEDURE — 6360000002 HC RX W HCPCS: Performed by: NURSE PRACTITIONER

## 2024-04-09 PROCEDURE — 70450 CT HEAD/BRAIN W/O DYE: CPT

## 2024-04-09 PROCEDURE — 87040 BLOOD CULTURE FOR BACTERIA: CPT

## 2024-04-09 PROCEDURE — 71045 X-RAY EXAM CHEST 1 VIEW: CPT

## 2024-04-09 PROCEDURE — 93010 ELECTROCARDIOGRAM REPORT: CPT | Performed by: INTERNAL MEDICINE

## 2024-04-09 PROCEDURE — 2060000000 HC ICU INTERMEDIATE R&B

## 2024-04-09 PROCEDURE — 82962 GLUCOSE BLOOD TEST: CPT

## 2024-04-09 PROCEDURE — 6370000000 HC RX 637 (ALT 250 FOR IP)

## 2024-04-09 PROCEDURE — 81001 URINALYSIS AUTO W/SCOPE: CPT

## 2024-04-09 PROCEDURE — 87154 CUL TYP ID BLD PTHGN 6+ TRGT: CPT

## 2024-04-09 PROCEDURE — 93005 ELECTROCARDIOGRAM TRACING: CPT

## 2024-04-09 PROCEDURE — 73700 CT LOWER EXTREMITY W/O DYE: CPT

## 2024-04-09 PROCEDURE — 84484 ASSAY OF TROPONIN QUANT: CPT

## 2024-04-09 PROCEDURE — 87086 URINE CULTURE/COLONY COUNT: CPT

## 2024-04-09 PROCEDURE — 6370000000 HC RX 637 (ALT 250 FOR IP): Performed by: NURSE PRACTITIONER

## 2024-04-09 PROCEDURE — 85025 COMPLETE CBC W/AUTO DIFF WBC: CPT

## 2024-04-09 PROCEDURE — 87077 CULTURE AEROBIC IDENTIFY: CPT

## 2024-04-09 PROCEDURE — 99285 EMERGENCY DEPT VISIT HI MDM: CPT

## 2024-04-09 PROCEDURE — 72128 CT CHEST SPINE W/O DYE: CPT

## 2024-04-09 RX ORDER — GLUCAGON 1 MG/ML
1 KIT INJECTION PRN
Status: DISCONTINUED | OUTPATIENT
Start: 2024-04-09 | End: 2024-04-12 | Stop reason: HOSPADM

## 2024-04-09 RX ORDER — SODIUM CHLORIDE 9 MG/ML
INJECTION, SOLUTION INTRAVENOUS PRN
Status: DISCONTINUED | OUTPATIENT
Start: 2024-04-09 | End: 2024-04-12 | Stop reason: HOSPADM

## 2024-04-09 RX ORDER — POTASSIUM CHLORIDE 20 MEQ/1
20 TABLET, EXTENDED RELEASE ORAL EVERY MORNING
Status: DISCONTINUED | OUTPATIENT
Start: 2024-04-10 | End: 2024-04-12 | Stop reason: HOSPADM

## 2024-04-09 RX ORDER — LOSARTAN POTASSIUM 50 MG/1
50 TABLET ORAL NIGHTLY
Status: ON HOLD | COMMUNITY
Start: 2024-01-26 | End: 2024-04-12

## 2024-04-09 RX ORDER — ASPIRIN 81 MG/1
81 TABLET ORAL EVERY MORNING
Status: DISCONTINUED | OUTPATIENT
Start: 2024-04-10 | End: 2024-04-12 | Stop reason: HOSPADM

## 2024-04-09 RX ORDER — MONTELUKAST SODIUM 4 MG/1
1 TABLET, CHEWABLE ORAL 2 TIMES DAILY
COMMUNITY
End: 2024-04-09 | Stop reason: ALTCHOICE

## 2024-04-09 RX ORDER — METOPROLOL SUCCINATE 25 MG/1
25 TABLET, EXTENDED RELEASE ORAL NIGHTLY
COMMUNITY

## 2024-04-09 RX ORDER — CHOLECALCIFEROL (VITAMIN D3) 50 MCG
2000 TABLET ORAL EVERY MORNING
Status: DISCONTINUED | OUTPATIENT
Start: 2024-04-10 | End: 2024-04-12 | Stop reason: HOSPADM

## 2024-04-09 RX ORDER — LOSARTAN POTASSIUM 50 MG/1
50 TABLET ORAL NIGHTLY
Status: DISCONTINUED | OUTPATIENT
Start: 2024-04-09 | End: 2024-04-12 | Stop reason: HOSPADM

## 2024-04-09 RX ORDER — HYDROCHLOROTHIAZIDE 12.5 MG/1
25 TABLET ORAL EVERY MORNING
COMMUNITY

## 2024-04-09 RX ORDER — DEXTROSE MONOHYDRATE 100 MG/ML
INJECTION, SOLUTION INTRAVENOUS CONTINUOUS PRN
Status: DISCONTINUED | OUTPATIENT
Start: 2024-04-09 | End: 2024-04-12 | Stop reason: HOSPADM

## 2024-04-09 RX ORDER — POTASSIUM CHLORIDE 1500 MG/1
20 TABLET, EXTENDED RELEASE ORAL EVERY MORNING
COMMUNITY
Start: 2024-03-31 | End: 2024-04-09 | Stop reason: ALTCHOICE

## 2024-04-09 RX ORDER — METOPROLOL SUCCINATE 25 MG/1
25 TABLET, EXTENDED RELEASE ORAL NIGHTLY
Status: DISCONTINUED | OUTPATIENT
Start: 2024-04-09 | End: 2024-04-12 | Stop reason: HOSPADM

## 2024-04-09 RX ORDER — DONEPEZIL HYDROCHLORIDE 5 MG/1
5 TABLET, FILM COATED ORAL EVERY MORNING
COMMUNITY

## 2024-04-09 RX ORDER — INSULIN LISPRO 100 [IU]/ML
0-4 INJECTION, SOLUTION INTRAVENOUS; SUBCUTANEOUS
Status: DISCONTINUED | OUTPATIENT
Start: 2024-04-09 | End: 2024-04-12 | Stop reason: HOSPADM

## 2024-04-09 RX ORDER — POTASSIUM CHLORIDE 20 MEQ/1
20 TABLET, EXTENDED RELEASE ORAL EVERY MORNING
COMMUNITY

## 2024-04-09 RX ORDER — CLOPIDOGREL BISULFATE 75 MG/1
75 TABLET ORAL EVERY MORNING
COMMUNITY

## 2024-04-09 RX ORDER — ACETAMINOPHEN 650 MG/1
650 SUPPOSITORY RECTAL EVERY 6 HOURS PRN
Status: DISCONTINUED | OUTPATIENT
Start: 2024-04-09 | End: 2024-04-12 | Stop reason: HOSPADM

## 2024-04-09 RX ORDER — PROCHLORPERAZINE EDISYLATE 5 MG/ML
5 INJECTION INTRAMUSCULAR; INTRAVENOUS EVERY 6 HOURS PRN
Status: DISCONTINUED | OUTPATIENT
Start: 2024-04-09 | End: 2024-04-12 | Stop reason: HOSPADM

## 2024-04-09 RX ORDER — BISACODYL 5 MG/1
5 TABLET, DELAYED RELEASE ORAL DAILY PRN
Status: DISCONTINUED | OUTPATIENT
Start: 2024-04-09 | End: 2024-04-12 | Stop reason: HOSPADM

## 2024-04-09 RX ORDER — ASPIRIN 81 MG/1
324 TABLET, CHEWABLE ORAL ONCE
Status: COMPLETED | OUTPATIENT
Start: 2024-04-09 | End: 2024-04-09

## 2024-04-09 RX ORDER — HEPARIN SODIUM 10000 [USP'U]/ML
5000 INJECTION, SOLUTION INTRAVENOUS; SUBCUTANEOUS EVERY 8 HOURS
Status: DISCONTINUED | OUTPATIENT
Start: 2024-04-09 | End: 2024-04-09

## 2024-04-09 RX ORDER — DONEPEZIL HYDROCHLORIDE 10 MG/1
5 TABLET, FILM COATED ORAL EVERY MORNING
Status: DISCONTINUED | OUTPATIENT
Start: 2024-04-10 | End: 2024-04-12 | Stop reason: HOSPADM

## 2024-04-09 RX ORDER — SODIUM CHLORIDE 0.9 % (FLUSH) 0.9 %
5-40 SYRINGE (ML) INJECTION PRN
Status: DISCONTINUED | OUTPATIENT
Start: 2024-04-09 | End: 2024-04-12 | Stop reason: HOSPADM

## 2024-04-09 RX ORDER — FLUOXETINE HYDROCHLORIDE 20 MG/1
40 CAPSULE ORAL EVERY MORNING
Status: DISCONTINUED | OUTPATIENT
Start: 2024-04-10 | End: 2024-04-12 | Stop reason: HOSPADM

## 2024-04-09 RX ORDER — HEPARIN SODIUM 10000 [USP'U]/ML
5000 INJECTION, SOLUTION INTRAVENOUS; SUBCUTANEOUS EVERY 8 HOURS
Status: DISCONTINUED | OUTPATIENT
Start: 2024-04-09 | End: 2024-04-11 | Stop reason: SDUPTHER

## 2024-04-09 RX ORDER — CLOPIDOGREL BISULFATE 75 MG/1
75 TABLET ORAL EVERY MORNING
Status: DISCONTINUED | OUTPATIENT
Start: 2024-04-10 | End: 2024-04-12 | Stop reason: HOSPADM

## 2024-04-09 RX ORDER — INSULIN LISPRO 100 [IU]/ML
0-4 INJECTION, SOLUTION INTRAVENOUS; SUBCUTANEOUS NIGHTLY
Status: DISCONTINUED | OUTPATIENT
Start: 2024-04-09 | End: 2024-04-12 | Stop reason: HOSPADM

## 2024-04-09 RX ORDER — ACETAMINOPHEN 325 MG/1
650 TABLET ORAL EVERY 6 HOURS PRN
Status: DISCONTINUED | OUTPATIENT
Start: 2024-04-09 | End: 2024-04-12 | Stop reason: HOSPADM

## 2024-04-09 RX ORDER — FOLIC ACID 1 MG/1
1 TABLET ORAL EVERY MORNING
Status: DISCONTINUED | OUTPATIENT
Start: 2024-04-10 | End: 2024-04-12 | Stop reason: HOSPADM

## 2024-04-09 RX ORDER — HYDROCHLOROTHIAZIDE 25 MG/1
25 TABLET ORAL EVERY MORNING
Status: DISCONTINUED | OUTPATIENT
Start: 2024-04-10 | End: 2024-04-12 | Stop reason: HOSPADM

## 2024-04-09 RX ORDER — AMLODIPINE BESYLATE 2.5 MG/1
2.5 TABLET ORAL EVERY MORNING
COMMUNITY
End: 2024-04-09 | Stop reason: ALTCHOICE

## 2024-04-09 RX ORDER — SODIUM CHLORIDE 0.9 % (FLUSH) 0.9 %
5-40 SYRINGE (ML) INJECTION EVERY 12 HOURS SCHEDULED
Status: DISCONTINUED | OUTPATIENT
Start: 2024-04-09 | End: 2024-04-12 | Stop reason: HOSPADM

## 2024-04-09 RX ORDER — TROSPIUM CHLORIDE 20 MG/1
20 TABLET, FILM COATED ORAL
Status: DISCONTINUED | OUTPATIENT
Start: 2024-04-09 | End: 2024-04-12 | Stop reason: HOSPADM

## 2024-04-09 RX ADMIN — CEFTRIAXONE 1000 MG: 1 INJECTION, POWDER, FOR SOLUTION INTRAMUSCULAR; INTRAVENOUS at 18:51

## 2024-04-09 RX ADMIN — TROSPIUM CHLORIDE 20 MG: 20 TABLET, FILM COATED ORAL at 18:28

## 2024-04-09 RX ADMIN — ASPIRIN 81 MG CHEWABLE TABLET 324 MG: 81 TABLET CHEWABLE at 13:52

## 2024-04-09 RX ADMIN — METOPROLOL SUCCINATE 25 MG: 25 TABLET, FILM COATED, EXTENDED RELEASE ORAL at 19:57

## 2024-04-09 RX ADMIN — LOSARTAN POTASSIUM 50 MG: 50 TABLET, FILM COATED ORAL at 19:57

## 2024-04-09 RX ADMIN — HEPARIN SODIUM 5000 UNITS: 10000 INJECTION INTRAVENOUS; SUBCUTANEOUS at 19:58

## 2024-04-09 RX ADMIN — SODIUM CHLORIDE, PRESERVATIVE FREE 10 ML: 5 INJECTION INTRAVENOUS at 20:01

## 2024-04-09 ASSESSMENT — PAIN DESCRIPTION - LOCATION: LOCATION: OTHER (COMMENT)

## 2024-04-09 ASSESSMENT — PAIN SCALES - GENERAL
PAINLEVEL_OUTOF10: 0
PAINLEVEL_OUTOF10: 10

## 2024-04-09 ASSESSMENT — PAIN - FUNCTIONAL ASSESSMENT: PAIN_FUNCTIONAL_ASSESSMENT: 0-10

## 2024-04-09 NOTE — ED TRIAGE NOTES
S/P FALL LAST NIGHT 8PM- UNKNOWN DURATION- EMS CALLED FROM  FOUND ON FLOOR OF BEDROOM  RIGHT KNEE GIVES OUT, USES WALKER FOR AMBULATION  Urinated on self thrupout night- full clean bed and placed  on purewick

## 2024-04-09 NOTE — CONSULTS
[facility]    INPATIENT CARDIOLOGY CONSULT    Name: Marcela Henao    Age: 88 y.o.    Date of Admission: 4/9/2024  9:51 AM    Date of Service: 4/9/2024    Reason for Consultation: Non-STEMI    Referring Physician: Sheik Herrera  Primary Care Physician: Jatinder Marquez MD    History of Present Illness: The patient is a 88 y.o. year old female not previously seen by [facility] cardiology who presents from home after fall.  Unclear how oriented to time she is, but states was on the floor 2 days. States knee gave way, no syncope, no lightheadedness or dizziness, no SOB, palpitations or chest pains. States  found her and brought to the ED. Unclear why troponin was drawn as she had no chest pains, but HS troponin was elevated 197>186CKP only 104.    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   Musculoskeletal: No joint pain or muscle aches. No claudication  Skin: No skin breakdown or rashes  Neuro: No headache, confusion, or seizures  Psych: No depression, anxiety    Family History:  History reviewed. No pertinent family history.    Social History:  Social History     Socioeconomic History    Marital status:      Spouse name: Not on file    Number of children: Not on file    Years of education: Not on file    Highest education level: Not on file   Occupational History    Not on file   Tobacco Use     fracture or traumatic malalignment of the cervical spine. Multilevel   degenerative changes seen within the cervical spine.         CT THORACIC SPINE WO CONTRAST   Final Result   No acute osseous abnormality of the thoracolumbar spine, pelvis or left hip.   Degenerative changes as described above.         CT LUMBAR SPINE WO CONTRAST   Final Result   No acute osseous abnormality of the thoracolumbar spine, pelvis or left hip.   Degenerative changes as described above.         CT HIP LEFT WO CONTRAST   Final Result   No acute osseous abnormality of the thoracolumbar spine, pelvis or left hip.   Degenerative changes as described above.               EKG and Telemetry:  12-lead EKG personally reviewed and shows sinus rhythm with left bundle branch block and diffuse T wave inversions.  Left bundle branch block is not new but T wave inversions appear more prominent.    Telemetry personally reviewed and shows sinus        ASSESSMENT / PLAN:    1. Non-STEMI, asymptomatic no chest pain.  Therefore no benefit for cath.  Would obtain an echocardiogram to assess LV function.  Continue with dual antiplatelet therapy.  Would continue beta-blocker but watch on telemetry.    2 left bundle branch block previously seen on EKGs stable    3 diabetes aim for A1c under 7    4 mild dementia on Aricept.    5 hypertension continue Cozaar and Toprol hydrochlorothiazide    6 previous stroke continue aspirin and Plavix          Thank you for consultation.    Jordin Yao MD, [facility]  [facility]    Electronically signed by Jordin Yao MD on 4/9/2024 at 4:28 PM

## 2024-04-09 NOTE — ED PROVIDER NOTES
used smokeless tobacco. She reports current alcohol use. She reports that she does not use drugs.    Family History: family history is not on file.     The patient’s home medications have been reviewed.    Allergies: Latex    REVIEW OF EXTERNAL NOTE :      Chart reviewed:         Previous Echo reviewed by me:  No results found for: \"LVEF\", \"LVEFMODE\"     No results found for this or any previous visit.       Controlled Substance Monitored by me:   Acute and Chronic Pain Monitoring:        No data to display                   ---------------------------------------------------PHYSICAL EXAM--------------------------------------      Constitutional/General: Alert and oriented x3, well appearing, non toxic in NAD  Head: Normocephalic and atraumatic  Eyes: EOMI, PERRL  Nose: Nose normal. No Septal Hematoma or Deviation   Mouth: Oropharynx clear, handling secretions, no trismus  Neck: Supple, full ROM, no nuchal rigidity, no meningeal signs  Pulmonary: Lungs clear to auscultation bilaterally, no wheezes, rales, or rhonchi. Not in respiratory distress  Cardiovascular:  Regular rate. Regular rhythm. No murmurs. +2 distal pulses  Chest: no chest wall tenderness  Abdomen: Soft.  Non tender. Non distended.   No rebound, guarding, or rigidity. No pulsatile masses appreciated.  Musculoskeletal:No step off deformities,  midspinal tenderness on all levels. Moves all extremities x 4 with weakness throughout.  Neurovascularly intact. Warm and well perfused, no clubbing or cyanosis. Compartments are soft and compressible. Capillary refill <3 seconds.  Skin: warm and dry. No rashes. No wounds.   Neurologic: GCS 15, Facial symmetry. Speech clear. No nystagmus.   Psych: Normal Affect    -------------------------------------------------- RESULTS -------------------------------------------------  I have personally reviewed all laboratory and imaging results for this patient. Results are listed below.     LABS:  Results for orders placed    perflutren lipid microspheres (DEFINITY) injection 1.5 mL (has no administration in time range)   sodium chloride flush 0.9 % injection 5-40 mL (has no administration in time range)   sodium chloride flush 0.9 % injection 5-40 mL (has no administration in time range)   0.9 % sodium chloride infusion (has no administration in time range)   acetaminophen (TYLENOL) tablet 650 mg (has no administration in time range)     Or   acetaminophen (TYLENOL) suppository 650 mg (has no administration in time range)   bisacodyl (DULCOLAX) EC tablet 5 mg (has no administration in time range)   prochlorperazine (COMPAZINE) injection 5 mg (has no administration in time range)   dextrose bolus 10% 125 mL (has no administration in time range)     Or   dextrose bolus 10% 250 mL (has no administration in time range)   glucagon injection 1 mg (has no administration in time range)   dextrose 10 % infusion (has no administration in time range)   aspirin EC tablet 81 mg (has no administration in time range)   vitamin D (CHOLECALCIFEROL) tablet 2,000 Units (has no administration in time range)   clopidogrel (PLAVIX) tablet 75 mg (has no administration in time range)   donepezil (ARICEPT) tablet 5 mg (has no administration in time range)   FLUoxetine (PROZAC) capsule 40 mg (has no administration in time range)   folic acid (FOLVITE) tablet 1 mg (has no administration in time range)   hydroCHLOROthiazide (HYDRODIURIL) tablet 25 mg (has no administration in time range)   losartan (COZAAR) tablet 50 mg (has no administration in time range)   metoprolol succinate (TOPROL XL) extended release tablet 25 mg (has no administration in time range)   potassium chloride (KLOR-CON M) extended release tablet 20 mEq (has no administration in time range)   trospium (SANCTURA) tablet 20 mg (20 mg Oral Given 4/9/24 1828)   insulin lispro (HUMALOG) injection vial 0-4 Units ( SubCUTAneous Not Given 4/9/24 1856)   insulin lispro (HUMALOG) injection vial 0-4

## 2024-04-09 NOTE — PROGRESS NOTES
4 Eyes Skin Assessment     NAME:  Marcela Henao  YOB: 1935  MEDICAL RECORD NUMBER:  [id number]    The patient is being assessed for  Admission    I agree that at least one RN has performed a thorough Head to Toe Skin Assessment on the patient. ALL assessment sites listed below have been assessed.      Areas assessed by both nurses:    Head, Face, Ears, Shoulders, Back, Chest, Arms, Elbows, Hands, Sacrum. Buttock, Coccyx, Ischium, Legs. Feet and Heels, and Under Medical Devices         Does the Patient have a Wound? No noted wound(s)       Randy Prevention initiated by RN: No  Wound Care Orders initiated by RN: No    Pressure Injury (Stage 3,4, Unstageable, DTI, NWPT, and Complex wounds) if present, place Wound referral order by RN under : No    New Ostomies, if present place, Ostomy referral order under : No     Nurse 1 eSignature: Electronically signed by Marylou Montoya RN on 4/9/24 at 5:18 PM EDT    **SHARE this note so that the co-signing nurse can place an eSignature**    Nurse 2 eSignature: {Esignature:[id number]}

## 2024-04-09 NOTE — PLAN OF CARE
Problem: Discharge Planning  Goal: Discharge to home or other facility with appropriate resources  Recent Flowsheet Documentation  Taken 4/9/2024 1715 by Marylou Montoya RN  Discharge to home or other facility with appropriate resources:   Arrange for needed discharge resources and transportation as appropriate   Identify barriers to discharge with patient and caregiver

## 2024-04-09 NOTE — ED NOTES
ED to Inpatient Handoff Report    Notified surendra that electronic handoff available and patient ready for transport to room 604.    Safety Risks: Risk of falls    Patient in Restraints: no    Constant Observer or Patient : no    Telemetry Monitoring Ordered :Yes           Order to transfer to unit without monitor:YES    Last MEWS: 1 Time completed: 1603    Deterioration Index Score:   Predictive Model Details          26 (Normal)  Factor Value    Calculated 4/9/2024 16:07 54% Age 88 years old    Deterioration Index Model 22% Respiratory rate 20     12% WBC count abnormal (13.5 k/uL)     4% Systolic 134     4% BUN abnormal (25 mg/dL)     2% Sodium 136 mmol/L     1% Potassium 3.7 mmol/L     1% Hematocrit 46.8 %     0% Pulse 77     0% Pulse oximetry 96 %     0% Temperature 98.1 °F (36.7 °C)        Vitals:    04/09/24 1002 04/09/24 1114 04/09/24 1347 04/09/24 1603   BP: (!) 170/81 (!) 155/87  134/79   Pulse: 92   77   Resp: 20   20   Temp: 98.1 °F (36.7 °C)   98.1 °F (36.7 °C)   TempSrc: Oral      SpO2: 95%   96%   Weight: 81.6 kg (180 lb)  81.6 kg (180 lb)    Height:   1.676 m (5' 6\")          Opportunity for questions and clarification was provided.

## 2024-04-10 ENCOUNTER — APPOINTMENT (OUTPATIENT)
Age: 89
DRG: 281 | End: 2024-04-10
Attending: INTERNAL MEDICINE
Payer: MEDICARE

## 2024-04-10 LAB
ANION GAP SERPL CALCULATED.3IONS-SCNC: 11 MMOL/L (ref 7–16)
BASOPHILS # BLD: 0.04 K/UL (ref 0–0.2)
BASOPHILS NFR BLD: 0 % (ref 0–2)
BUN SERPL-MCNC: 35 MG/DL (ref 6–23)
CALCIUM SERPL-MCNC: 9.7 MG/DL (ref 8.6–10.2)
CHLORIDE SERPL-SCNC: 100 MMOL/L (ref 98–107)
CHOLEST SERPL-MCNC: 202 MG/DL
CO2 SERPL-SCNC: 26 MMOL/L (ref 22–29)
CREAT SERPL-MCNC: 1.5 MG/DL (ref 0.5–1)
ECHO AO ASC DIAM: 3.9 CM
ECHO AO ASCENDING AORTA INDEX: 2.07 CM/M2
ECHO AR MAX VEL PISA: 3.9 M/S
ECHO AV AREA PEAK VELOCITY: 2.7 CM2
ECHO AV AREA VTI: 2.4 CM2
ECHO AV AREA/BSA PEAK VELOCITY: 1.4 CM2/M2
ECHO AV AREA/BSA VTI: 1.3 CM2/M2
ECHO AV CUSP MM: 2.1 CM
ECHO AV MEAN GRADIENT: 4 MMHG
ECHO AV MEAN VELOCITY: 0.9 M/S
ECHO AV PEAK GRADIENT: 7 MMHG
ECHO AV PEAK VELOCITY: 1.4 M/S
ECHO AV REGURGITANT PHT: 513.7 MILLISECOND
ECHO AV VELOCITY RATIO: 0.79
ECHO AV VTI: 22.4 CM
ECHO BSA: 1.95 M2
ECHO LA DIAMETER INDEX: 2.18 CM/M2
ECHO LA DIAMETER: 4.1 CM
ECHO LA VOL A-L A2C: 45 ML (ref 22–52)
ECHO LA VOL A-L A4C: 39 ML (ref 22–52)
ECHO LA VOL MOD A2C: 44 ML (ref 22–52)
ECHO LA VOL MOD A4C: 37 ML (ref 22–52)
ECHO LA VOLUME AREA LENGTH: 42 ML
ECHO LA VOLUME INDEX A-L A2C: 24 ML/M2 (ref 16–34)
ECHO LA VOLUME INDEX A-L A4C: 21 ML/M2 (ref 16–34)
ECHO LA VOLUME INDEX AREA LENGTH: 22 ML/M2 (ref 16–34)
ECHO LA VOLUME INDEX MOD A2C: 23 ML/M2 (ref 16–34)
ECHO LA VOLUME INDEX MOD A4C: 20 ML/M2 (ref 16–34)
ECHO LV EDV A2C: 111 ML
ECHO LV EDV A4C: 99 ML
ECHO LV EDV BP: 107 ML (ref 56–104)
ECHO LV EDV INDEX A4C: 53 ML/M2
ECHO LV EDV INDEX BP: 57 ML/M2
ECHO LV EDV NDEX A2C: 59 ML/M2
ECHO LV EJECTION FRACTION A2C: 55 %
ECHO LV EJECTION FRACTION A4C: 51 %
ECHO LV EJECTION FRACTION BIPLANE: 54 % (ref 55–100)
ECHO LV ESV A2C: 50 ML
ECHO LV ESV A4C: 48 ML
ECHO LV ESV BP: 49 ML (ref 19–49)
ECHO LV ESV INDEX A2C: 27 ML/M2
ECHO LV ESV INDEX A4C: 26 ML/M2
ECHO LV ESV INDEX BP: 26 ML/M2
ECHO LV FRACTIONAL SHORTENING: 30 % (ref 28–44)
ECHO LV INTERNAL DIMENSION DIASTOLE INDEX: 1.6 CM/M2
ECHO LV INTERNAL DIMENSION DIASTOLIC: 3 CM (ref 3.9–5.3)
ECHO LV INTERNAL DIMENSION SYSTOLIC INDEX: 1.12 CM/M2
ECHO LV INTERNAL DIMENSION SYSTOLIC: 2.1 CM
ECHO LV IVSD: 1.8 CM (ref 0.6–0.9)
ECHO LV IVSS: 1.9 CM
ECHO LV MASS 2D: 167 G (ref 67–162)
ECHO LV MASS INDEX 2D: 88.8 G/M2 (ref 43–95)
ECHO LV POSTERIOR WALL DIASTOLIC: 1.3 CM (ref 0.6–0.9)
ECHO LV POSTERIOR WALL SYSTOLIC: 2.2 CM
ECHO LV RELATIVE WALL THICKNESS RATIO: 0.87
ECHO LVOT AREA: 3.1 CM2
ECHO LVOT AV VTI INDEX: 0.75
ECHO LVOT DIAM: 2 CM
ECHO LVOT MEAN GRADIENT: 2 MMHG
ECHO LVOT PEAK GRADIENT: 5 MMHG
ECHO LVOT PEAK VELOCITY: 1.1 M/S
ECHO LVOT STROKE VOLUME INDEX: 27.9 ML/M2
ECHO LVOT SV: 52.4 ML
ECHO LVOT VTI: 16.7 CM
ECHO MV A VELOCITY: 0.36 M/S
ECHO MV AREA PHT: 7.2 CM2
ECHO MV AREA VTI: 2.3 CM2
ECHO MV E DECELERATION TIME (DT): 88.9 MS
ECHO MV E VELOCITY: 1.08 M/S
ECHO MV E/A RATIO: 3
ECHO MV LVOT VTI INDEX: 1.36
ECHO MV MAX VELOCITY: 1.2 M/S
ECHO MV MEAN GRADIENT: 2 MMHG
ECHO MV MEAN VELOCITY: 0.6 M/S
ECHO MV PEAK GRADIENT: 6 MMHG
ECHO MV PRESSURE HALF TIME (PHT): 30.6 MS
ECHO MV VTI: 22.7 CM
ECHO PV MAX VELOCITY: 0.9 M/S
ECHO PV MEAN GRADIENT: 1 MMHG
ECHO PV MEAN VELOCITY: 0.5 M/S
ECHO PV PEAK GRADIENT: 3 MMHG
ECHO PV VTI: 10.1 CM
EOSINOPHIL # BLD: 0.01 K/UL (ref 0.05–0.5)
EOSINOPHILS RELATIVE PERCENT: 0 % (ref 0–6)
ERYTHROCYTE [DISTWIDTH] IN BLOOD BY AUTOMATED COUNT: 13.8 % (ref 11.5–15)
GFR SERPL CREATININE-BSD FRML MDRD: 34 ML/MIN/1.73M2
GLUCOSE BLD-MCNC: 128 MG/DL (ref 74–99)
GLUCOSE BLD-MCNC: 154 MG/DL (ref 74–99)
GLUCOSE BLD-MCNC: 169 MG/DL (ref 74–99)
GLUCOSE BLD-MCNC: 223 MG/DL (ref 74–99)
GLUCOSE SERPL-MCNC: 161 MG/DL (ref 74–99)
HCT VFR BLD AUTO: 45.6 % (ref 34–48)
HDLC SERPL-MCNC: 45 MG/DL
HGB BLD-MCNC: 14.5 G/DL (ref 11.5–15.5)
IMM GRANULOCYTES # BLD AUTO: 0.06 K/UL (ref 0–0.58)
IMM GRANULOCYTES NFR BLD: 1 % (ref 0–5)
LDLC SERPL CALC-MCNC: 129 MG/DL
LYMPHOCYTES NFR BLD: 0.84 K/UL (ref 1.5–4)
LYMPHOCYTES RELATIVE PERCENT: 7 % (ref 20–42)
MAGNESIUM SERPL-MCNC: 1.8 MG/DL (ref 1.6–2.6)
MCH RBC QN AUTO: 30.1 PG (ref 26–35)
MCHC RBC AUTO-ENTMCNC: 31.8 G/DL (ref 32–34.5)
MCV RBC AUTO: 94.8 FL (ref 80–99.9)
MONOCYTES NFR BLD: 1.36 K/UL (ref 0.1–0.95)
MONOCYTES NFR BLD: 12 % (ref 2–12)
NEUTROPHILS NFR BLD: 80 % (ref 43–80)
NEUTS SEG NFR BLD: 9.22 K/UL (ref 1.8–7.3)
PLATELET # BLD AUTO: 252 K/UL (ref 130–450)
PMV BLD AUTO: 10.4 FL (ref 7–12)
POTASSIUM SERPL-SCNC: 3.4 MMOL/L (ref 3.5–5)
RBC # BLD AUTO: 4.81 M/UL (ref 3.5–5.5)
SODIUM SERPL-SCNC: 137 MMOL/L (ref 132–146)
TRIGL SERPL-MCNC: 140 MG/DL
VLDLC SERPL CALC-MCNC: 28 MG/DL
WBC OTHER # BLD: 11.5 K/UL (ref 4.5–11.5)

## 2024-04-10 PROCEDURE — 83735 ASSAY OF MAGNESIUM: CPT

## 2024-04-10 PROCEDURE — 97530 THERAPEUTIC ACTIVITIES: CPT

## 2024-04-10 PROCEDURE — 93306 TTE W/DOPPLER COMPLETE: CPT

## 2024-04-10 PROCEDURE — 2580000003 HC RX 258: Performed by: NURSE PRACTITIONER

## 2024-04-10 PROCEDURE — 82962 GLUCOSE BLOOD TEST: CPT

## 2024-04-10 PROCEDURE — 80048 BASIC METABOLIC PNL TOTAL CA: CPT

## 2024-04-10 PROCEDURE — 6360000002 HC RX W HCPCS: Performed by: NURSE PRACTITIONER

## 2024-04-10 PROCEDURE — 6360000002 HC RX W HCPCS: Performed by: INTERNAL MEDICINE

## 2024-04-10 PROCEDURE — 6370000000 HC RX 637 (ALT 250 FOR IP): Performed by: NURSE PRACTITIONER

## 2024-04-10 PROCEDURE — 6370000000 HC RX 637 (ALT 250 FOR IP): Performed by: FAMILY MEDICINE

## 2024-04-10 PROCEDURE — 6370000000 HC RX 637 (ALT 250 FOR IP): Performed by: INTERNAL MEDICINE

## 2024-04-10 PROCEDURE — 97161 PT EVAL LOW COMPLEX 20 MIN: CPT

## 2024-04-10 PROCEDURE — 80061 LIPID PANEL: CPT

## 2024-04-10 PROCEDURE — 2060000000 HC ICU INTERMEDIATE R&B

## 2024-04-10 PROCEDURE — 85025 COMPLETE CBC W/AUTO DIFF WBC: CPT

## 2024-04-10 RX ORDER — POTASSIUM CHLORIDE 20 MEQ/1
20 TABLET, EXTENDED RELEASE ORAL ONCE
Status: COMPLETED | OUTPATIENT
Start: 2024-04-10 | End: 2024-04-10

## 2024-04-10 RX ORDER — ATORVASTATIN CALCIUM 40 MG/1
40 TABLET, FILM COATED ORAL NIGHTLY
Status: DISCONTINUED | OUTPATIENT
Start: 2024-04-10 | End: 2024-04-12 | Stop reason: HOSPADM

## 2024-04-10 RX ADMIN — TROSPIUM CHLORIDE 20 MG: 20 TABLET, FILM COATED ORAL at 06:27

## 2024-04-10 RX ADMIN — HEPARIN SODIUM 5000 UNITS: 10000 INJECTION INTRAVENOUS; SUBCUTANEOUS at 20:32

## 2024-04-10 RX ADMIN — INSULIN LISPRO 1 UNITS: 100 INJECTION, SOLUTION INTRAVENOUS; SUBCUTANEOUS at 11:33

## 2024-04-10 RX ADMIN — Medication 2000 UNITS: at 08:26

## 2024-04-10 RX ADMIN — FOLIC ACID 1 MG: 1 TABLET ORAL at 08:26

## 2024-04-10 RX ADMIN — PETROLATUM: 420 OINTMENT TOPICAL at 20:30

## 2024-04-10 RX ADMIN — TROSPIUM CHLORIDE 20 MG: 20 TABLET, FILM COATED ORAL at 16:42

## 2024-04-10 RX ADMIN — HEPARIN SODIUM 5000 UNITS: 10000 INJECTION INTRAVENOUS; SUBCUTANEOUS at 03:15

## 2024-04-10 RX ADMIN — DONEPEZIL HYDROCHLORIDE 5 MG: 10 TABLET, FILM COATED ORAL at 08:26

## 2024-04-10 RX ADMIN — POTASSIUM CHLORIDE 20 MEQ: 1500 TABLET, EXTENDED RELEASE ORAL at 08:27

## 2024-04-10 RX ADMIN — HYDROCHLOROTHIAZIDE 25 MG: 25 TABLET ORAL at 08:27

## 2024-04-10 RX ADMIN — CLOPIDOGREL BISULFATE 75 MG: 75 TABLET ORAL at 08:26

## 2024-04-10 RX ADMIN — HEPARIN SODIUM 5000 UNITS: 10000 INJECTION INTRAVENOUS; SUBCUTANEOUS at 11:34

## 2024-04-10 RX ADMIN — DESMOPRESSIN ACETATE 40 MG: 0.2 TABLET ORAL at 20:29

## 2024-04-10 RX ADMIN — POTASSIUM CHLORIDE 20 MEQ: 20 TABLET, EXTENDED RELEASE ORAL at 08:27

## 2024-04-10 RX ADMIN — SODIUM CHLORIDE, PRESERVATIVE FREE 10 ML: 5 INJECTION INTRAVENOUS at 20:30

## 2024-04-10 RX ADMIN — SODIUM CHLORIDE, PRESERVATIVE FREE 10 ML: 5 INJECTION INTRAVENOUS at 08:27

## 2024-04-10 RX ADMIN — METOPROLOL SUCCINATE 25 MG: 25 TABLET, FILM COATED, EXTENDED RELEASE ORAL at 20:29

## 2024-04-10 RX ADMIN — LOSARTAN POTASSIUM 50 MG: 50 TABLET, FILM COATED ORAL at 20:29

## 2024-04-10 RX ADMIN — ASPIRIN 81 MG: 81 TABLET, COATED ORAL at 08:27

## 2024-04-10 RX ADMIN — PETROLATUM: 420 OINTMENT TOPICAL at 13:00

## 2024-04-10 RX ADMIN — FLUOXETINE HYDROCHLORIDE 40 MG: 20 CAPSULE ORAL at 08:26

## 2024-04-10 RX ADMIN — CEFTRIAXONE 1000 MG: 1 INJECTION, POWDER, FOR SOLUTION INTRAMUSCULAR; INTRAVENOUS at 16:43

## 2024-04-10 NOTE — PROGRESS NOTES
History  Chief Complaint   Patient presents with    Alcohol Intoxication     States he drove self to ED , he wants to speed up his getting into inpatient treatment for alcohol  Patient with slurred speech on arrival   He is not sure if he was at [facility] yesterday or day before     Patient presents for evaluation of alcohol abuse  States history of alcohol abuse  Admits to drinking today  Drove himself to the ER to get inpatient rehab  States was at [facility] 2-3 days ago and they were unable to find a place for him  He is waiting to hear from someone they put him in contact with  So he came to [facility] to see if we could place him  History provided by:  Patient   used: No    Alcohol Intoxication   Associated symptoms: no abdominal pain, no shortness of breath and no suicidal ideation        Prior to Admission Medications   Prescriptions Last Dose Informant Patient Reported? Taking?   diazepam (Valium) 5 mg tablet   No No   Sig: Take 1 tablet (5 mg total) by mouth every 8 (eight) hours as needed for anxiety for up to 20 doses   Patient taking differently: Take 5 mg by mouth every 8 (eight) hours as needed for anxiety States " I ran out "      Facility-Administered Medications: None       Past Medical History:   Diagnosis Date    Alcohol abuse     Alcoholism ([facility] [address] )     Anxiety     Depression        Past Surgical History:   Procedure Laterality Date    HERNIA REPAIR         History reviewed  No pertinent family history  I have reviewed and agree with the history as documented      E-Cigarette/Vaping    E-Cigarette Use Never User      E-Cigarette/Vaping Substances     Social History     Tobacco Use    Smoking status: Current Every Day Smoker     Packs/day: 0 50     Years: 5 00     Pack years: 2 50    Smokeless tobacco: Never Used   Substance Use Topics    Alcohol use: Yes     Frequency: 4 or more times a week     Drinks per session: 10 or more     Binge frequency: Daily or almost daily Occupational Therapy  OT consult received to eval/treat and chart review complete. Attempted OT evaluation, patient off floor for testing. OT to re-attempt at a later date/time as able and appropriate.     Tammy Marx OTR/L  License Number: [id number]     Comment: 1 bottle of vodka daily    Drug use: Not Currently       Review of Systems   Respiratory: Negative for shortness of breath  Cardiovascular: Negative for chest pain  Gastrointestinal: Negative for abdominal pain  Psychiatric/Behavioral: Negative for suicidal ideas  All other systems reviewed and are negative  Physical Exam  Physical Exam   Constitutional: He is oriented to person, place, and time  No distress  HENT:   Head: Atraumatic  Mouth/Throat: Oropharynx is clear and moist    Eyes: Pupils are equal, round, and reactive to light  Neck: Normal range of motion  Cardiovascular: Normal rate, regular rhythm and intact distal pulses  Pulmonary/Chest: Effort normal and breath sounds normal  No respiratory distress  Abdominal: Soft  Bowel sounds are normal  He exhibits no distension  There is no tenderness  There is no rebound and no guarding  Musculoskeletal: Normal range of motion  Neurological: He is alert and oriented to person, place, and time  Skin: Capillary refill takes less than 2 seconds  He is not diaphoretic  Nursing note and vitals reviewed        Vital Signs  ED Triage Vitals [02/27/20 2211]   Temperature Pulse Respirations Blood Pressure SpO2   98 1 °F (36 7 °C) 90 18 163/86 98 %      Temp Source Heart Rate Source Patient Position - Orthostatic VS BP Location FiO2 (%)   Temporal Monitor Sitting Left arm --      Pain Score       6           Vitals:    02/27/20 2315 02/27/20 2328 02/27/20 2345 02/28/20 0000   BP:       Pulse: 78 78 80 86   Patient Position - Orthostatic VS:             Visual Acuity      ED Medications  Medications - No data to display    Diagnostic Studies  Results Reviewed     Procedure Component Value Units Date/Time    Ethanol [[id number]]  (Abnormal) Collected:  02/27/20 2243    Lab Status:  Final result Specimen:  Blood from Arm, Left Updated:  02/27/20 2300     Ethanol Lvl 195 mg/dL                  No orders to display Procedures  Procedures         ED Course                               MDM  Number of Diagnoses or Management Options  Diagnosis management comments: Pulse ox 97% on RA indicating adequate oxygenation    Advised patient he would have to be assessed when sober and any evaluation for placement would have to wait to morning  There was also no guarantee inpatient rehab will be found  Patient understood and wanted to stay until morning  Signed out to next provider, Dr Nicola Solano  Amount and/or Complexity of Data Reviewed  Clinical lab tests: ordered and reviewed  Decide to obtain previous medical records or to obtain history from someone other than the patient: yes  Review and summarize past medical records: yes  Discuss the patient with other providers: yes    Patient Progress  Patient progress: stable        Disposition  Final diagnoses:   None     ED Disposition     None      Follow-up Information    None         Patient's Medications   Discharge Prescriptions    No medications on file     No discharge procedures on file      PDMP Review       Value Time User    PDMP Reviewed  Yes 1/8/2020  1:43 AM Pati Carrillo MD          ED Provider  Electronically Signed by           Ava Black DO  02/28/20 3475

## 2024-04-10 NOTE — PROGRESS NOTES
While rounding patient was on a gurney in the hallway having returned from testing. While waiting to go back into her room and bed conversation ensued and the patient was receptive to this . Remaining present through attentive listening and words of support and encouragement this  offered a brief prayer of blessing for the patient.  remains available for support.

## 2024-04-10 NOTE — PROGRESS NOTES
in the upper and lower extremities bilaterally  Neuro: No focal deficits. Moves all extremities appropriately to command.  Normal sensation in the upper and lower extremities bilaterally  Psychiatric: Cooperative, and normal affect    Intake/Output:    Intake/Output Summary (Last 24 hours) at 4/10/2024 1352  Last data filed at 4/10/2024 1215  Gross per 24 hour   Intake 240 ml   Output --   Net 240 ml     I/O this shift:  In: 240 [P.O.:240]  Out: -     Laboratory Tests:  Last 3 CBC:  Recent Labs     04/09/24  1107 04/10/24  0405   WBC 13.5* 11.5   RBC 5.07 4.81   HGB 15.0 14.5   HCT 46.8 45.6   MCV 92.3 94.8   MCH 29.6 30.1   MCHC 32.1 31.8*   RDW 13.6 13.8    252   MPV 9.7 10.4       Last 3 CMP:    Recent Labs     04/09/24  1107 04/10/24  0405    137   K 3.7 3.4*   CL 97* 100   CO2 30* 26   BUN 25* 35*   CREATININE 1.2* 1.5*   GLUCOSE 209* 161*   CALCIUM 9.9 9.7   PROT 6.9  --    LABALBU 3.8  --    BILITOT 0.8  --    ALKPHOS 80  --    AST 27  --    ALT 18  --        Last 3 Mag/Phos:  Recent Labs     04/10/24  0405   MG 1.8       Last 3 CK, CKMB, Troponin  Recent Labs     04/09/24  1107   CKTOTAL 104       Last 3 Pro-BNP:  No results for input(s): \"PROBNP\" in the last 72 hours.  No results found for: \"PROBNP\"    Last 3 Glucose:     Recent Labs     04/09/24  1107 04/10/24  0405   GLUCOSE 209* 161*       Last 3 Coags:  No results for input(s): \"PROTIME\", \"INR\", \"PTT\" in the last 72 hours.  Lab Results   Component Value Date/Time    PROTIME 11.6 07/19/2023 06:13 PM    INR 1.1 07/19/2023 06:13 PM       Last 3 Lipid Panel:  Recent Labs     04/10/24  0405   HDL 45   TRIG 140     Lab Results   Component Value Date    LDLCALC 55 06/15/2022    LDLCALC 61 04/01/2015     Lab Results   Component Value Date    HDL 45 04/10/2024    HDL 43 02/28/2024    HDL 41 12/09/2023     Lab Results   Component Value Date    TRIG 140 04/10/2024    TRIG 93 02/28/2024    TRIG 67 12/09/2023     No components found for:  \"CHLPL\"    TSH:  No results for input(s): \"TSH\" in the last 72 hours.  Lab Results   Component Value Date/Time    TSH 3.26 12/09/2023 03:18 AM       ABGs:  No results for input(s): \"PH\", \"PO2\", \"PCO2\", \"HCO3\", \"BE\", \"O2SAT\" in the last 72 hours.    Lactic Acid:  No results for input(s): \"LACTA\" in the last 72 hours.      Radiology:  RAD Results:  XR HIP BILATERAL W AP PELVIS (2 VIEWS)   Final Result   No acute abnormality of the pelvis and bilateral hips.         XR CHEST 1 VIEW   Final Result   1. No acute cardiopulmonary process.   2. Redemonstration of focal opacity in the lateral left lung base.         CT HEAD WO CONTRAST   Final Result   1. No acute intracranial abnormality.   2. Generalized atrophy and chronic small vessel ischemic change. Old lacunar   insult seen within the right basal ganglia.         CT CERVICAL SPINE WO CONTRAST   Final Result   No acute fracture or traumatic malalignment of the cervical spine. Multilevel   degenerative changes seen within the cervical spine.         CT THORACIC SPINE WO CONTRAST   Final Result   No acute osseous abnormality of the thoracolumbar spine, pelvis or left hip.   Degenerative changes as described above.         CT LUMBAR SPINE WO CONTRAST   Final Result   No acute osseous abnormality of the thoracolumbar spine, pelvis or left hip.   Degenerative changes as described above.         CT HIP LEFT WO CONTRAST   Final Result   No acute osseous abnormality of the thoracolumbar spine, pelvis or left hip.   Degenerative changes as described above.               EKG and Telemetry:  12-lead EKG personally reviewed and shows sinus rhythm with left bundle branch block and diffuse T wave inversions.  Left bundle branch block is not new but T wave inversions appear more prominent.    Telemetry personally reviewed and shows sinus        ASSESSMENT / PLAN:    1. Non-STEMI, asymptomatic no chest pain.  Therefore no benefit for cath.  Normal LV function. continue with dual

## 2024-04-10 NOTE — ACP (ADVANCE CARE PLANNING)
Advance Care Planning   Healthcare Decision Maker:    Primary Decision Maker: Talon Henao Jr. - [facility] - [phone]    Click here to complete Healthcare Decision Makers including selection of the Healthcare Decision Maker Relationship (ie \"Primary\").  Today we documented Decision Maker(s) consistent with Legal Next of Kin hierarchy.

## 2024-04-10 NOTE — PROGRESS NOTES
Physical Therapy  Facility/Department: [address] MED SURG  Physical Therapy Initial Assessment    Name: Marcela Henao  : 1935  MRN: [id number]  Date of Service: 4/10/2024    Attending Provider:  Stacey Sim DO    Evaluating PT:  Saturnino Rainey Jr., P.T.    Room #:  0604/06-A  Diagnosis:  NSTEMI (non-ST elevated myocardial infarction) (HCC) [I21.4], pt states her knee gave out while getting up to go to BR at night and she fell to the floor.  found her in the morning and called ambulance to bring her to hospital.    Pertinent PMHx/PSHx:  CVA, legs give out without warning and this causes her to fall.   Precautions:  falls, bed/chair alarm, L knee with valgus deformity and position when standing.    SUBJECTIVE:    Pt lives with her  in a 2 story home with 2 stairs and 2 rails to enter.  Her bed and bath are on the first floor.  Pt ambulated with a ww PTA.  She has a WC if needed.    OBJECTIVE:   Initial Evaluation  Date: 4/10/24 Treatment Short Term/ Long Term   Goals   Was pt agreeable to Eval/treatment? yes     Does pt have pain? No c/o pain     Bed Mobility  Rolling: SBA  Supine to sit: SBA  Sit to supine: MIN A  Scooting: MAX A toward HOB  supervision   Transfers Sit to stand: MAX A  Stand to sit: MOD A  Stand pivot: NA  SBA   Ambulation   NA, pt was unable to advance her feet this am  50 feet with ww CGA   Stair negotiation: ascended and descended NA  2 steps with 2 rails CGA   AM-PAC 6 Clicks        BLE ROM is WFL.   BLE strength is grossly 3-/5 to 4-/5.   Sensation:  Pt denies numbness and tingling to extremities  Balance: sitting is supervision and standing with ww is MOD A/MAX A  Endurance: fair-    Patient education  Pt educated on transfers    Patient response to education:   Pt verbalized understanding Pt demonstrated skill Pt requires further education in this area   yes Yes with VCs and assist yes     ASSESSMENT:    Conditions Requiring Skilled Therapeutic

## 2024-04-10 NOTE — H&P
[facility]  History and Physical      CHIEF COMPLAINT:    Chief Complaint   Patient presents with    Fall     S/P FALL LAST NIGHT 8PM- UNKNOWN DURATION- EMS CALLED FROM  FOUND ON FLOOR OF BEDROOM            Patient of Jatinder Marquez MD presents with:  NSTEMI (non-ST elevated myocardial infarction) (Formerly [facility])    History of Present Illness:   Patient is an 88-year-old female with past medical history of DM, HLD, HTN, carotid stenosis, CVA, dementia who presents to the emergency room after a fall.  Patient had a series of CT scans on arrival to emergency revealing no acute findings.  Labs revealed mildly elevated BUN/creatinine 25/1.2, elevated troponin 197-, mild leukocytosis of 13.5.  Cardiology was consulted and patient is admitted to intermediate telemetry for further workup and treatment.  Resting comfortably in no acute distress.  She denies any chest pain or shortness of breath and indicates she feels quite comfortable.  Echocardiogram has been completed with LV function of 50 to 55% y    REVIEW OF SYSTEMS:  Pertinent negatives are above in HPI.  10 point ROS otherwise negative.      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    Stroke (Formerly [facility])          Past Surgical History:   Procedure Laterality Date     SECTION      CHOLECYSTECTOMY      COLONOSCOPY      EYE SURGERY      bilateral w/ implants    HYSTERECTOMY (CERVIX STATUS UNKNOWN)         Medications Prior to Admission:    Medications Prior to Admission: clopidogrel (PLAVIX) 75 MG tablet, Take 1 tablet by mouth every morning  donepezil (ARICEPT) 5 MG tablet, Take 1 tablet by mouth every morning  hydroCHLOROthiazide 12.5 MG tablet, Take 2 tablets by mouth every morning  metoprolol succinate (TOPROL XL) 25  and affect.  Neuro: Alert and interactive, face symmetric, speech fluent.    LABS:  All labs reviewed.  Of note:  CBC:   Lab Results   Component Value Date/Time    WBC 11.5 04/10/2024 04:05 AM    RBC 4.81 04/10/2024 04:05 AM    HGB 14.5 04/10/2024 04:05 AM    HCT 45.6 04/10/2024 04:05 AM    MCV 94.8 04/10/2024 04:05 AM    MCH 30.1 04/10/2024 04:05 AM    MCHC 31.8 04/10/2024 04:05 AM    RDW 13.8 04/10/2024 04:05 AM     04/10/2024 04:05 AM    MPV 10.4 04/10/2024 04:05 AM     CMP:    Lab Results   Component Value Date/Time     04/10/2024 04:05 AM    K 3.4 04/10/2024 04:05 AM    K 3.3 06/10/2022 04:53 AM     04/10/2024 04:05 AM    CO2 26 04/10/2024 04:05 AM    BUN 35 04/10/2024 04:05 AM    CREATININE 1.5 04/10/2024 04:05 AM    GFRAA 52 06/29/2022 03:37 AM    LABGLOM 34 04/10/2024 04:05 AM    GLUCOSE 161 04/10/2024 04:05 AM    PROT 6.9 04/09/2024 11:07 AM    LABALBU 3.8 04/09/2024 11:07 AM    CALCIUM 9.7 04/10/2024 04:05 AM    BILITOT 0.8 04/09/2024 11:07 AM    ALKPHOS 80 04/09/2024 11:07 AM    AST 27 04/09/2024 11:07 AM    ALT 18 04/09/2024 11:07 AM       Imaging:  CT head/C-spine negative  CT L/T-spine negative  CT left hip: Negative  CXR negative    EKG:  Normal sinus rhythm with first-degree AV block    Telemetry:  I've personally reviewed the patient's telemetry:  Sinus rhythm    ASSESSMENT/PLAN:  Principal Problem:    NSTEMI (non-ST elevated myocardial infarction) (HCC)  Resolved Problems:    * No resolved hospital problems. *    Patient is an 88-year-old female admitted to [facility] for  NSTEMI  -Monitor labs  -Troponin [id number]  -Check lipid panel and A1c  -Continue ASA Plavix  -Echocardiogram-EF 50 to 55%  -  -Because she will be having medical management and not heart catheter intervention, risk factor modifications are complete necessity  -Await placement issues for dc      diabetes mellitus  -Continue to monitor blood glucose levels  -Continue low-dose corrective

## 2024-04-10 NOTE — PLAN OF CARE
Problem: ABCDS Injury Assessment  Goal: Absence of physical injury  Outcome: Progressing     Problem: Discharge Planning  Goal: Discharge to home or other facility with appropriate resources  Outcome: Progressing     Problem: Pain  Goal: Verbalizes/displays adequate comfort level or baseline comfort level  Outcome: Progressing     Problem: Safety - Adult  Goal: Free from fall injury  Outcome: Progressing     Problem: Skin/Tissue Integrity  Goal: Absence of new skin breakdown  Description: 1.  Monitor for areas of redness and/or skin breakdown  2.  Assess vascular access sites hourly  3.  Every 4-6 hours minimum:  Change oxygen saturation probe site  4.  Every 4-6 hours:  If on nasal continuous positive airway pressure, respiratory therapy assess nares and determine need for appliance change or resting period.  Outcome: Progressing

## 2024-04-10 NOTE — CARE COORDINATION
Introduced myself and provided explanation of CM role to patient. Admitted for NSTEMI, She had a fall getting into bed and was on the floor all night until morning when her  found her. Cardiology is following. ECHO ordered. UA positive, IV rocephin, PO plavix, blood cx's pending, PT/OT evals pending.She voices she resides in a 2 story home with her spouse. She ambulates with a walker and completes her adl's with independence. Patient is established with Dr. Marquez and denies any issue with retail pharmaceutical coverage. Pt. was accepted to [facility]/26/[facility] but never admitted, verified with Susan, she is following, will need new HHC orders if discharged home w/[facility]. Pt. Has hx with [facility] Velas[facility] and would return if needed. Will continue to follow.  Elvira Borges BSN, RN  Case Management

## 2024-04-10 NOTE — PLAN OF CARE
Problem: ABCDS Injury Assessment  Goal: Absence of physical injury  Outcome: Progressing     Problem: Discharge Planning  Goal: Discharge to home or other facility with appropriate resources  Outcome: Progressing  Flowsheets (Taken 4/9/2024 1715 by Marylou Montoya, RN)  Discharge to home or other facility with appropriate resources:   Arrange for needed discharge resources and transportation as appropriate   Identify barriers to discharge with patient and caregiver     Problem: Pain  Goal: Verbalizes/displays adequate comfort level or baseline comfort level  4/9/2024 2135 by Jayy Mustafa, RN  Outcome: Progressing  4/9/2024 1728 by Marylou Montoya, RN  Outcome: Progressing  Flowsheets  Taken 4/9/2024 1715  Verbalizes/displays adequate comfort level or baseline comfort level:   Encourage patient to monitor pain and request assistance   Assess pain using appropriate pain scale  Taken 4/9/2024 1603  Verbalizes/displays adequate comfort level or baseline comfort level: Encourage patient to monitor pain and request assistance

## 2024-04-11 LAB
ANION GAP SERPL CALCULATED.3IONS-SCNC: 12 MMOL/L (ref 7–16)
BASOPHILS # BLD: 0.03 K/UL (ref 0–0.2)
BASOPHILS NFR BLD: 0 % (ref 0–2)
BUN SERPL-MCNC: 44 MG/DL (ref 6–23)
CALCIUM SERPL-MCNC: 9.4 MG/DL (ref 8.6–10.2)
CHLORIDE SERPL-SCNC: 96 MMOL/L (ref 98–107)
CO2 SERPL-SCNC: 28 MMOL/L (ref 22–29)
CREAT SERPL-MCNC: 2.1 MG/DL (ref 0.5–1)
EOSINOPHIL # BLD: 0.07 K/UL (ref 0.05–0.5)
EOSINOPHILS RELATIVE PERCENT: 1 % (ref 0–6)
ERYTHROCYTE [DISTWIDTH] IN BLOOD BY AUTOMATED COUNT: 13.9 % (ref 11.5–15)
GFR SERPL CREATININE-BSD FRML MDRD: 23 ML/MIN/1.73M2
GLUCOSE BLD-MCNC: 150 MG/DL (ref 74–99)
GLUCOSE BLD-MCNC: 180 MG/DL (ref 74–99)
GLUCOSE BLD-MCNC: 196 MG/DL (ref 74–99)
GLUCOSE BLD-MCNC: 280 MG/DL (ref 74–99)
GLUCOSE SERPL-MCNC: 182 MG/DL (ref 74–99)
HCT VFR BLD AUTO: 42 % (ref 34–48)
HGB BLD-MCNC: 13.7 G/DL (ref 11.5–15.5)
IMM GRANULOCYTES # BLD AUTO: 0.05 K/UL (ref 0–0.58)
IMM GRANULOCYTES NFR BLD: 1 % (ref 0–5)
LYMPHOCYTES NFR BLD: 1.16 K/UL (ref 1.5–4)
LYMPHOCYTES RELATIVE PERCENT: 15 % (ref 20–42)
MAGNESIUM SERPL-MCNC: 2 MG/DL (ref 1.6–2.6)
MCH RBC QN AUTO: 30.4 PG (ref 26–35)
MCHC RBC AUTO-ENTMCNC: 32.6 G/DL (ref 32–34.5)
MCV RBC AUTO: 93.1 FL (ref 80–99.9)
MONOCYTES NFR BLD: 1.16 K/UL (ref 0.1–0.95)
MONOCYTES NFR BLD: 15 % (ref 2–12)
NEUTROPHILS NFR BLD: 68 % (ref 43–80)
NEUTS SEG NFR BLD: 5.21 K/UL (ref 1.8–7.3)
PLATELET # BLD AUTO: 240 K/UL (ref 130–450)
PMV BLD AUTO: 10.7 FL (ref 7–12)
POTASSIUM SERPL-SCNC: 3.5 MMOL/L (ref 3.5–5)
RBC # BLD AUTO: 4.51 M/UL (ref 3.5–5.5)
SODIUM SERPL-SCNC: 136 MMOL/L (ref 132–146)
WBC OTHER # BLD: 7.7 K/UL (ref 4.5–11.5)

## 2024-04-11 PROCEDURE — 6370000000 HC RX 637 (ALT 250 FOR IP): Performed by: INTERNAL MEDICINE

## 2024-04-11 PROCEDURE — 97165 OT EVAL LOW COMPLEX 30 MIN: CPT

## 2024-04-11 PROCEDURE — 2060000000 HC ICU INTERMEDIATE R&B

## 2024-04-11 PROCEDURE — 80048 BASIC METABOLIC PNL TOTAL CA: CPT

## 2024-04-11 PROCEDURE — 6370000000 HC RX 637 (ALT 250 FOR IP): Performed by: NURSE PRACTITIONER

## 2024-04-11 PROCEDURE — 82962 GLUCOSE BLOOD TEST: CPT

## 2024-04-11 PROCEDURE — 2580000003 HC RX 258: Performed by: NURSE PRACTITIONER

## 2024-04-11 PROCEDURE — 83735 ASSAY OF MAGNESIUM: CPT

## 2024-04-11 PROCEDURE — 85025 COMPLETE CBC W/AUTO DIFF WBC: CPT

## 2024-04-11 PROCEDURE — 6360000002 HC RX W HCPCS: Performed by: INTERNAL MEDICINE

## 2024-04-11 PROCEDURE — 6360000002 HC RX W HCPCS: Performed by: NURSE PRACTITIONER

## 2024-04-11 RX ORDER — HEPARIN SODIUM 5000 [USP'U]/ML
5000 INJECTION, SOLUTION INTRAVENOUS; SUBCUTANEOUS EVERY 8 HOURS
Status: DISCONTINUED | OUTPATIENT
Start: 2024-04-11 | End: 2024-04-12 | Stop reason: HOSPADM

## 2024-04-11 RX ADMIN — METOPROLOL SUCCINATE 25 MG: 25 TABLET, FILM COATED, EXTENDED RELEASE ORAL at 20:32

## 2024-04-11 RX ADMIN — POTASSIUM CHLORIDE 20 MEQ: 20 TABLET, EXTENDED RELEASE ORAL at 08:29

## 2024-04-11 RX ADMIN — SODIUM CHLORIDE, PRESERVATIVE FREE 10 ML: 5 INJECTION INTRAVENOUS at 20:32

## 2024-04-11 RX ADMIN — ASPIRIN 81 MG: 81 TABLET, COATED ORAL at 08:26

## 2024-04-11 RX ADMIN — HYDROCHLOROTHIAZIDE 25 MG: 25 TABLET ORAL at 08:27

## 2024-04-11 RX ADMIN — SODIUM CHLORIDE, PRESERVATIVE FREE 10 ML: 5 INJECTION INTRAVENOUS at 17:49

## 2024-04-11 RX ADMIN — INSULIN LISPRO 2 UNITS: 100 INJECTION, SOLUTION INTRAVENOUS; SUBCUTANEOUS at 11:48

## 2024-04-11 RX ADMIN — FOLIC ACID 1 MG: 1 TABLET ORAL at 08:27

## 2024-04-11 RX ADMIN — DESMOPRESSIN ACETATE 40 MG: 0.2 TABLET ORAL at 20:31

## 2024-04-11 RX ADMIN — TROSPIUM CHLORIDE 20 MG: 20 TABLET, FILM COATED ORAL at 17:31

## 2024-04-11 RX ADMIN — Medication 2000 UNITS: at 08:27

## 2024-04-11 RX ADMIN — PETROLATUM: 420 OINTMENT TOPICAL at 20:34

## 2024-04-11 RX ADMIN — CEFTRIAXONE 1000 MG: 1 INJECTION, POWDER, FOR SOLUTION INTRAMUSCULAR; INTRAVENOUS at 17:32

## 2024-04-11 RX ADMIN — HEPARIN SODIUM 5000 UNITS: 10000 INJECTION INTRAVENOUS; SUBCUTANEOUS at 03:27

## 2024-04-11 RX ADMIN — TROSPIUM CHLORIDE 20 MG: 20 TABLET, FILM COATED ORAL at 06:05

## 2024-04-11 RX ADMIN — PETROLATUM: 420 OINTMENT TOPICAL at 08:31

## 2024-04-11 RX ADMIN — FLUOXETINE HYDROCHLORIDE 40 MG: 20 CAPSULE ORAL at 08:27

## 2024-04-11 RX ADMIN — DONEPEZIL HYDROCHLORIDE 5 MG: 10 TABLET, FILM COATED ORAL at 08:28

## 2024-04-11 RX ADMIN — HEPARIN SODIUM 5000 UNITS: 5000 INJECTION INTRAVENOUS; SUBCUTANEOUS at 20:32

## 2024-04-11 RX ADMIN — HEPARIN SODIUM 5000 UNITS: 5000 INJECTION INTRAVENOUS; SUBCUTANEOUS at 11:46

## 2024-04-11 RX ADMIN — CLOPIDOGREL BISULFATE 75 MG: 75 TABLET ORAL at 08:26

## 2024-04-11 RX ADMIN — SODIUM CHLORIDE, PRESERVATIVE FREE 10 ML: 5 INJECTION INTRAVENOUS at 08:30

## 2024-04-11 NOTE — PLAN OF CARE
Problem: ABCDS Injury Assessment  Goal: Absence of physical injury  4/11/2024 0031 by Jayy Mustafa RN  Outcome: Progressing  4/10/2024 1737 by Beryl Schmidt RN  Outcome: Progressing     Problem: Discharge Planning  Goal: Discharge to home or other facility with appropriate resources  4/11/2024 0031 by Jayy Mustafa RN  Outcome: Progressing  4/10/2024 1737 by Beryl Schmidt RN  Outcome: Progressing     Problem: Pain  Goal: Verbalizes/displays adequate comfort level or baseline comfort level  4/11/2024 0031 by Jayy Mustafa RN  Outcome: Progressing  4/10/2024 1737 by Beryl Schmidt RN  Outcome: Progressing     Problem: Safety - Adult  Goal: Free from fall injury  4/11/2024 0031 by Jayy Mustafa RN  Outcome: Progressing  4/10/2024 1737 by Beryl Schmidt RN  Outcome: Progressing     Problem: Skin/Tissue Integrity  Goal: Absence of new skin breakdown  Description: 1.  Monitor for areas of redness and/or skin breakdown  2.  Assess vascular access sites hourly  3.  Every 4-6 hours minimum:  Change oxygen saturation probe site  4.  Every 4-6 hours:  If on nasal continuous positive airway pressure, respiratory therapy assess nares and determine need for appliance change or resting period.  4/11/2024 0031 by Jayy Mustafa RN  Outcome: Progressing  4/10/2024 1737 by Beryl Schmidt RN  Outcome: Progressing

## 2024-04-11 NOTE — PROGRESS NOTES
rhythm, normal S1 and physiologically split S2, no S3, no S4. Apical impulse is nondisplaced.  No murmurs, no pericardial rubs, no clicks.  Carotid upstrokes brisk.   Respiratory: Clear bilaterally; no wheezes, no rales, no rhonchi.  Unlabored respirations  Abdomen: Soft, nontender, nondistended, bowel sounds+, no hepatomegaly, no masses, no abdominal bruits  Extremities: No edema, no cyanosis, no clubbing.  Distal pulses intact  Skin: Intact, warm and dry, no rashes, no breakdown  Musculoskeletal: normal tone and strength in the upper and lower extremities bilaterally  Neuro: No focal deficits. Moves all extremities appropriately to command.  Normal sensation in the upper and lower extremities bilaterally  Psychiatric: Cooperative, and normal affect    Intake/Output:    Intake/Output Summary (Last 24 hours) at 4/11/2024 1415  Last data filed at 4/11/2024 0830  Gross per 24 hour   Intake 364 ml   Output --   Net 364 ml     I/O this shift:  In: 114 [P.O.:100; I.V.:14]  Out: -     Laboratory Tests:  Last 3 CBC:  Recent Labs     04/09/24  1107 04/10/24  0405 04/11/24  0320   WBC 13.5* 11.5 7.7   RBC 5.07 4.81 4.51   HGB 15.0 14.5 13.7   HCT 46.8 45.6 42.0   MCV 92.3 94.8 93.1   MCH 29.6 30.1 30.4   MCHC 32.1 31.8* 32.6   RDW 13.6 13.8 13.9    252 240   MPV 9.7 10.4 10.7       Last 3 CMP:    Recent Labs     04/09/24  1107 04/10/24  0405 04/11/24  0320    137 136   K 3.7 3.4* 3.5   CL 97* 100 96*   CO2 30* 26 28   BUN 25* 35* 44*   CREATININE 1.2* 1.5* 2.1*   GLUCOSE 209* 161* 182*   CALCIUM 9.9 9.7 9.4   PROT 6.9  --   --    LABALBU 3.8  --   --    BILITOT 0.8  --   --    ALKPHOS 80  --   --    AST 27  --   --    ALT 18  --   --        Last 3 Mag/Phos:  Recent Labs     04/10/24  0405 04/11/24  0320   MG 1.8 2.0       Last 3 CK, CKMB, Troponin  Recent Labs     04/09/24  1107   CKTOTAL 104       Last 3 Pro-BNP:  No results for input(s): \"PROBNP\" in the last 72 hours.  No results found for: \"PROBNP\"    Last 3  osseous abnormality of the thoracolumbar spine, pelvis or left hip.   Degenerative changes as described above.         CT HIP LEFT WO CONTRAST   Final Result   No acute osseous abnormality of the thoracolumbar spine, pelvis or left hip.   Degenerative changes as described above.                 ASSESSMENT / PLAN:    1. Non-STEMI, asymptomatic no chest pain.  Therefore no benefit for cath.  Normal LV function. continue with dual antiplatelet therapy.  Would continue beta-blocker but watch on telemetry.  Add a statin    2 left bundle branch block previously seen on EKGs stable    3 diabetes aim for A1c under 7    4 mild dementia on Aricept.    5 hypertension on Cozaar and Toprol hydrochlorothiazide    6 previous stroke continue aspirin and Plavix    7 NICK Cr 2.1, azotemic, try holding cozaar , may need to switch to CCB          Thank you for consultation.    Jordin Yao MD, [facility]  [facility]    Electronically signed by Jordin Yao MD on 4/11/2024 at 2:15 PM

## 2024-04-11 NOTE — CARE COORDINATION
Met with patient and , Talon. Admitted for NSTEMI, and fall at home. Cardiology is following.   ECHO 4/10. Currently on room air, IV rocephin, PO plavix.  PT/11 OT/pending.  Pt. has hx with [facility] she and  are agreeable for her to return. Referral given to [facility], she will review, await to hear back. Will continue to follow.  Elvira AYALAN, RN  Case Management

## 2024-04-11 NOTE — PROGRESS NOTES
[facility]                                Progress note    Subjective:    The patient is awake and alert.    Resting comfortably in bed  No complaints on assessment    Objective:    /62   Pulse 71   Temp 98.1 °F (36.7 °C) (Oral)   Resp 16   Ht 1.676 m (5' 6\")   Wt 82.3 kg (181 lb 8 oz)   SpO2 99%   BMI 29.29 kg/m²     In: 604 [P.O.:590; I.V.:14]  Out: -   In: 604   Out: -     General appearance: NAD, conversant, confused  HEENT: AT/NC, MMM  Neck: FROM, supple  Lungs: Clear to auscultation  CV: RRR, no MRGs  Vasc: Radial pulses 2+  Abdomen: Soft, non-tender; no masses or HSM  Extremities: No peripheral edema or digital cyanosis  Skin: no rash, lesions or ulcers  Psych: Alert and oriented to person, place  Neuro: Alert and interactive     Recent Labs     04/09/24  1107 04/10/24  0405 04/11/24  0320   WBC 13.5* 11.5 7.7   HGB 15.0 14.5 13.7   HCT 46.8 45.6 42.0    252 240       Recent Labs     04/09/24  1107 04/10/24  0405 04/11/24  0320    137 136   K 3.7 3.4* 3.5   CL 97* 100 96*   CO2 30* 26 28   BUN 25* 35* 44*   CREATININE 1.2* 1.5* 2.1*   CALCIUM 9.9 9.7 9.4       Assessment:    Principal Problem:    NSTEMI (non-ST elevated myocardial infarction) ([facility])  Resolved Problems:    * No resolved hospital problems. *      Plan:     Patient is an 88-year-old female admitted to [facility] for  NSTEMI  -Monitor labs  -Troponin [phone]  -Check lipid panel and A1c  -Continue ASA Plavix  -Echocardiogram-EF 50 to 55%  -  -Because she will be having medical management and not heart catheter intervention, risk factor modifications are complete necessity  -Await placement issues for dc        diabetes mellitus  -Continue to monitor blood glucose levels  -Continue low-dose corrective ISS    4/11/24:  -No plans for cardiac catheterization per cardiology  -Started on statin by cardio  -Recommending to continue beta-blocker but watch on telemetry  -Urine culture positive for  Past Medical History:   Diagnosis Date   • Cancer of skin    • Cancer of skin 08/20/2019    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 Padilla   • Carpal tunnel syndrome 12/9/14    right, mod-severe   • Carpal tunnel syndrome 5/26/15    left, moderate   • Hearing loss    • OPEN-ANGLE GLAUCOMA NOS 8/7/2009   • Other and unspecified hyperlipidemia    • Unspecified essential hypertension         Klebsiella pneumoniae, await sensitivities  -Mild bump in kidney function, 44/2.1, monitor output  -Bladder scan ordered and was done 2 hours after urination with a scan of 315, will need to be repeated  -Await discharge disposition per social work     Code status: Full  Consultants: Cardio     DVT Prophylaxis heparin  PT/OT  Discharge planning       I have spent a total time of 30 minutes of this patient encounter reviewing chart, labs, coordinating care with interdisciplinary teams, face to face encounter with patient, providing counseling/education to patient/family.      Елена Kearns, REGINA - CNP  4:47 PM  4/11/2024

## 2024-04-11 NOTE — PROGRESS NOTES
Occupational Therapy    OCCUPATIONAL THERAPY INITIAL EVALUATION    [facility]   [address], [place]         Date:2024                                                  Patient Name: Marcela Henao    MRN: [id number]    : 1935    Room: [address] [place] [address]      Evaluating OT: Zoë Smith OTR/L   [id number]      Referring Provider:Isamar N - CNP     Specific Provider Orders/Date:OT eval and treat 2024      Diagnosis:  NSTEMI (non-ST elevated myocardial infarction) (HCC) [I21.4]     Pertinent Medical History: DM, HTN,   CVA, legs give out without warning and this causes her to fall,  L knee with valgus     Precautions:  Fall Risk,      Assessment of current deficits    [x] Functional mobility  [x]ADLs  [x] Strength               []Cognition    [x] Functional transfers   [x] IADLs         [x] Safety Awareness   [x]Endurance    [] Fine Coordination              [x] Balance      [] Vision/perception   []Sensation     []Gross Motor Coordination  [] ROM  [] Delirium                   [] Motor Control     OT PLAN OF CARE   OT POC based on physician orders, patient diagnosis and results of clinical assessment    Frequency/Duration  2-4 days/wk for 2 - 4weeks PRN   Specific OT Treatment Interventions to include:   ADL retraining/adapted techniques and AE recommendations to increase functional independence within precautions                    Energy conservation techniques to improve tolerance for selfcare routine   Functional transfer/mobility training/DME recommendations for increased independence, safety and fall prevention         Patient/family education to increase safety and functional independence             Environmental modifications for safe mobility and completion of ADLs                             Therapeutic activity to improve functional performance during ADLs.                                         Therapeutic exercise  light and phone within reach, all lines and tubes intact.  BED ALARM not able to set- nurse made aware     Overall patient demonstrated  decreased independence and safety during completion of ADL/functional transfer/mobility tasks.  Pt would benefit from continued skilled OT to increase safety and independence with completion of ADL/IADL tasks for functional independence and quality of life.            Rehab Potential: good for established goals     Patient / Family Goal: none stated       Patient and/or family were instructed on functional diagnosis, prognosis/goals and OT plan of care. Demonstrated good  understanding.     Eval Complexity: Low    Time In: 1146  Time Out: 1205      Min Units   OT Eval Low 97165 x  1   OT Eval Medium 61150      OT Eval High 99720      OT Re-Eval 93628       Therapeutic Ex 01821      Therapeutic Activities 32366      ADL/Self Care 73485       Orthotic Management 71593       Manual 63542     Neuro Re-Ed 00132       Non-Billable Time      OT Re eval 04474        Evaluation Time additionally includes thorough review of current medical information, gathering information on past medical history/social history and prior level of function, interpretation of standardized testing/informal observation of tasks, assessment of data and development of plan of care and goals.            Zoë Smith  OTR/L  OT [id number]

## 2024-04-12 VITALS
RESPIRATION RATE: 16 BRPM | WEIGHT: 190 LBS | TEMPERATURE: 97.9 F | DIASTOLIC BLOOD PRESSURE: 78 MMHG | BODY MASS INDEX: 30.53 KG/M2 | HEIGHT: 66 IN | OXYGEN SATURATION: 99 % | HEART RATE: 69 BPM | SYSTOLIC BLOOD PRESSURE: 147 MMHG

## 2024-04-12 LAB
ANION GAP SERPL CALCULATED.3IONS-SCNC: 8 MMOL/L (ref 7–16)
BASOPHILS # BLD: 0.04 K/UL (ref 0–0.2)
BASOPHILS NFR BLD: 1 % (ref 0–2)
BUN SERPL-MCNC: 51 MG/DL (ref 6–23)
CALCIUM SERPL-MCNC: 9.5 MG/DL (ref 8.6–10.2)
CHLORIDE SERPL-SCNC: 100 MMOL/L (ref 98–107)
CO2 SERPL-SCNC: 27 MMOL/L (ref 22–29)
CREAT SERPL-MCNC: 2.1 MG/DL (ref 0.5–1)
EOSINOPHIL # BLD: 0.25 K/UL (ref 0.05–0.5)
EOSINOPHILS RELATIVE PERCENT: 3 % (ref 0–6)
ERYTHROCYTE [DISTWIDTH] IN BLOOD BY AUTOMATED COUNT: 13.6 % (ref 11.5–15)
GFR SERPL CREATININE-BSD FRML MDRD: 22 ML/MIN/1.73M2
GLUCOSE BLD-MCNC: 152 MG/DL (ref 74–99)
GLUCOSE BLD-MCNC: 161 MG/DL (ref 74–99)
GLUCOSE SERPL-MCNC: 160 MG/DL (ref 74–99)
HCT VFR BLD AUTO: 41.9 % (ref 34–48)
HGB BLD-MCNC: 13.6 G/DL (ref 11.5–15.5)
IMM GRANULOCYTES # BLD AUTO: 0.04 K/UL (ref 0–0.58)
IMM GRANULOCYTES NFR BLD: 1 % (ref 0–5)
LYMPHOCYTES NFR BLD: 1.09 K/UL (ref 1.5–4)
LYMPHOCYTES RELATIVE PERCENT: 14 % (ref 20–42)
MCH RBC QN AUTO: 30.6 PG (ref 26–35)
MCHC RBC AUTO-ENTMCNC: 32.5 G/DL (ref 32–34.5)
MCV RBC AUTO: 94.4 FL (ref 80–99.9)
MICROORGANISM SPEC CULT: ABNORMAL
MICROORGANISM SPEC CULT: ABNORMAL
MICROORGANISM/AGENT SPEC: ABNORMAL
MONOCYTES NFR BLD: 1.01 K/UL (ref 0.1–0.95)
MONOCYTES NFR BLD: 13 % (ref 2–12)
NEUTROPHILS NFR BLD: 69 % (ref 43–80)
NEUTS SEG NFR BLD: 5.42 K/UL (ref 1.8–7.3)
PLATELET # BLD AUTO: 250 K/UL (ref 130–450)
PMV BLD AUTO: 10.1 FL (ref 7–12)
POTASSIUM SERPL-SCNC: 3.9 MMOL/L (ref 3.5–5)
RBC # BLD AUTO: 4.44 M/UL (ref 3.5–5.5)
SERVICE CMNT-IMP: ABNORMAL
SODIUM SERPL-SCNC: 135 MMOL/L (ref 132–146)
SPECIMEN DESCRIPTION: ABNORMAL
SPECIMEN DESCRIPTION: ABNORMAL
WBC OTHER # BLD: 7.9 K/UL (ref 4.5–11.5)

## 2024-04-12 PROCEDURE — 6360000002 HC RX W HCPCS: Performed by: INTERNAL MEDICINE

## 2024-04-12 PROCEDURE — 6370000000 HC RX 637 (ALT 250 FOR IP): Performed by: INTERNAL MEDICINE

## 2024-04-12 PROCEDURE — 2580000003 HC RX 258: Performed by: NURSE PRACTITIONER

## 2024-04-12 PROCEDURE — 85025 COMPLETE CBC W/AUTO DIFF WBC: CPT

## 2024-04-12 PROCEDURE — 82962 GLUCOSE BLOOD TEST: CPT

## 2024-04-12 PROCEDURE — 97530 THERAPEUTIC ACTIVITIES: CPT

## 2024-04-12 PROCEDURE — 6370000000 HC RX 637 (ALT 250 FOR IP): Performed by: NURSE PRACTITIONER

## 2024-04-12 PROCEDURE — 80048 BASIC METABOLIC PNL TOTAL CA: CPT

## 2024-04-12 RX ORDER — ATORVASTATIN CALCIUM 40 MG/1
40 TABLET, FILM COATED ORAL NIGHTLY
Qty: 30 TABLET | Refills: 3 | DISCHARGE
Start: 2024-04-12

## 2024-04-12 RX ORDER — AMLODIPINE BESYLATE 5 MG/1
5 TABLET ORAL DAILY
Qty: 30 TABLET | Refills: 3 | DISCHARGE
Start: 2024-04-13

## 2024-04-12 RX ORDER — CIPROFLOXACIN 250 MG/1
250 TABLET, FILM COATED ORAL 2 TIMES DAILY
Qty: 14 TABLET | Refills: 0 | DISCHARGE
Start: 2024-04-12 | End: 2024-04-19

## 2024-04-12 RX ORDER — AMLODIPINE BESYLATE 5 MG/1
5 TABLET ORAL DAILY
Status: DISCONTINUED | OUTPATIENT
Start: 2024-04-12 | End: 2024-04-12 | Stop reason: HOSPADM

## 2024-04-12 RX ORDER — LOSARTAN POTASSIUM 50 MG/1
50 TABLET ORAL NIGHTLY
Qty: 30 TABLET | Refills: 3 | DISCHARGE
Start: 2024-04-19

## 2024-04-12 RX ADMIN — FOLIC ACID 1 MG: 1 TABLET ORAL at 09:35

## 2024-04-12 RX ADMIN — ACETAMINOPHEN 650 MG: 325 TABLET ORAL at 02:10

## 2024-04-12 RX ADMIN — HYDROCHLOROTHIAZIDE 25 MG: 25 TABLET ORAL at 09:35

## 2024-04-12 RX ADMIN — CLOPIDOGREL BISULFATE 75 MG: 75 TABLET ORAL at 09:35

## 2024-04-12 RX ADMIN — POTASSIUM CHLORIDE 20 MEQ: 20 TABLET, EXTENDED RELEASE ORAL at 09:36

## 2024-04-12 RX ADMIN — HEPARIN SODIUM 5000 UNITS: 5000 INJECTION INTRAVENOUS; SUBCUTANEOUS at 11:13

## 2024-04-12 RX ADMIN — Medication 2000 UNITS: at 09:36

## 2024-04-12 RX ADMIN — TROSPIUM CHLORIDE 20 MG: 20 TABLET, FILM COATED ORAL at 06:36

## 2024-04-12 RX ADMIN — AMLODIPINE BESYLATE 5 MG: 5 TABLET ORAL at 09:49

## 2024-04-12 RX ADMIN — SODIUM CHLORIDE, PRESERVATIVE FREE 10 ML: 5 INJECTION INTRAVENOUS at 09:40

## 2024-04-12 RX ADMIN — ASPIRIN 81 MG: 81 TABLET, COATED ORAL at 09:35

## 2024-04-12 RX ADMIN — PETROLATUM: 420 OINTMENT TOPICAL at 09:39

## 2024-04-12 RX ADMIN — DONEPEZIL HYDROCHLORIDE 5 MG: 10 TABLET, FILM COATED ORAL at 09:36

## 2024-04-12 RX ADMIN — FLUOXETINE HYDROCHLORIDE 40 MG: 20 CAPSULE ORAL at 09:35

## 2024-04-12 RX ADMIN — HEPARIN SODIUM 5000 UNITS: 5000 INJECTION INTRAVENOUS; SUBCUTANEOUS at 03:19

## 2024-04-12 NOTE — PROGRESS NOTES
Patient states she doesn't know when she has to void. When prompted patient will urinate. 300cc noted from [place] and 1 wet pad changed.

## 2024-04-12 NOTE — DISCHARGE INSTR - COC
example:[id number]}  Last Modified Barium Swallow with Video (Video Swallowing Test): {Done Not Done Date:}    Treatments at the Time of Hospital Discharge:   Respiratory Treatments: ***  Oxygen Therapy:  {Therapy; copd oxygen:05046}  Ventilator:    { CC Vent List:350725707}    Rehab Therapies: {THERAPEUTIC INTERVENTION:2056076171}  Weight Bearing Status/Restrictions: {[facility] Weight Bearin}  Other Medical Equipment (for information only, NOT a DME order):  {EQUIPMENT:675268135}  Other Treatments: ***    Patient's personal belongings (please select all that are sent with patient):  {CHP DME Belongings:872521656}    RN SIGNATURE:  {Esignature:770014818}    CASE MANAGEMENT/SOCIAL WORK SECTION    Inpatient Status Date: 24    Readmission Risk Assessment Score:  Readmission Risk              Risk of Unplanned Readmission:  23           Discharging to Facility/ Agency   Name: [facility]  Address   [address] [place] [address]             Contact Information    [phone]          Dialysis Facility (if applicable)   Name:  Address:  Dialysis Schedule:  Phone:  Fax:    / signature: Electronically signed by Elvira Borges on 24 at 8:48 AM EDT    PHYSICIAN SECTION    Prognosis: {Prognosis:3608488995}    Condition at Discharge: { Patient Condition:724384458}    Rehab Potential (if transferring to Rehab): {Prognosis:9659001217}    Recommended Labs or Other Treatments After Discharge: ***    Physician Certification: I certify the above information and transfer of Marcela Henao  is necessary for the continuing treatment of the diagnosis listed and that she requires Skilled Nursing Facility for less 30 days.     Update Admission H&P: {CHP DME Changes in HandP:266078087}    PHYSICIAN SIGNATURE:  {Esignature:445026716}

## 2024-04-12 NOTE — PROGRESS NOTES
[facility]                                Progress note    Subjective:    The patient is awake and alert.    Resting comfortably in bed  No complaints on assessment    Objective:    BP (!) 148/76   Pulse 75   Temp 97.9 °F (36.6 °C) (Oral)   Resp 18   Ht 1.676 m (5' 6\")   Wt 86.2 kg (190 lb)   SpO2 95%   BMI 30.67 kg/m²     In: 414 [P.O.:390; I.V.:24]  Out: 400   In: 414   Out: 400 [Urine:400]    General appearance: NAD, conversant, confused  HEENT: AT/NC, MMM  Neck: FROM, supple  Lungs: Clear to auscultation  CV: RRR, no MRGs  Vasc: Radial pulses 2+  Abdomen: Soft, non-tender; no masses or HSM  Extremities: No peripheral edema or digital cyanosis  Skin: no rash, lesions or ulcers  Psych: Alert and oriented to person, place  Neuro: Alert and interactive     Recent Labs     04/10/24  0405 04/11/24  0320 04/12/24  0407   WBC 11.5 7.7 7.9   HGB 14.5 13.7 13.6   HCT 45.6 42.0 41.9    240 250       Recent Labs     04/10/24  0405 04/11/24  0320 04/12/24  0327    136 135   K 3.4* 3.5 3.9    96* 100   CO2 26 28 27   BUN 35* 44* 51*   CREATININE 1.5* 2.1* 2.1*   CALCIUM 9.7 9.4 9.5       Assessment:    Principal Problem:    NSTEMI (non-ST elevated myocardial infarction) ([facility])  Resolved Problems:    * No resolved hospital problems. *      Plan:     Patient is an 88-year-old female admitted to [facility] for  NSTEMI  -Monitor labs  -Troponin [phone]  -Check lipid panel and A1c  -Continue ASA Plavix  -Echocardiogram-EF 50 to 55%  -  -Because she will be having medical management and not heart catheter intervention, risk factor modifications are complete necessity  -Await placement issues for dc        diabetes mellitus  -Continue to monitor blood glucose levels  -Continue low-dose corrective ISS    4/11/24:  -No plans for cardiac catheterization per cardiology  -Started on statin by cardio  -Recommending to continue beta-blocker but watch on telemetry  -Urine culture

## 2024-04-12 NOTE — PROGRESS NOTES
SPIRITUAL HEALTH SERVICES - KAYLA Rodrigues Encounter    Name: Marcela Henao                  Referral: Routine Visit    Sacraments  Anointed (Last Rites): Yes  Apostolic [place]: No  Confession: No  Communion: Yes     Assessment:  Patient receptive to  visit.      Intervention:   provided spiritual support and sacramental ministry for patient.     Outcome:  Patient expressed gratitude for visit.    Plan:  Chaplains will remain available to offer spiritual and emotional support as needed.      Electronically signed by Chaplain Neli, on 4/12/2024 at 3:53 PM.  Spiritual Care Department  [facility]  [phone]

## 2024-04-12 NOTE — CARE COORDINATION
AM-PAC 12/15 Accepted at [facility], pre-cert started. LYNETTE, 7000 initiated, envelope with facesheet and transport form in chart. Will continue to follow.  Elvira GARCIA, RN  Case Management     Update: Received Auth for [facility].  .Elvira GARICA, RN  Case Management

## 2024-04-12 NOTE — CARE COORDINATION
Plan to discharge today to [facility] via [facility] at 3pm. Patient, nursing and facility was notified.  left with  Talon and son Jose. Envelope with facesheet and transport form in chart.  Elvira AYALAN, RN  Case Management

## 2024-04-12 NOTE — PROGRESS NOTES
--   --        Last 3 Mag/Phos:  Recent Labs     04/10/24  0405 04/11/24  0320   MG 1.8 2.0       Last 3 CK, CKMB, Troponin  Recent Labs     04/09/24  1107   CKTOTAL 104       Last 3 Pro-BNP:  No results for input(s): \"PROBNP\" in the last 72 hours.  No results found for: \"PROBNP\"    Last 3 Glucose:     Recent Labs     04/10/24  0405 04/11/24  0320 04/12/24  0327   GLUCOSE 161* 182* 160*       Last 3 Coags:  No results for input(s): \"PROTIME\", \"INR\", \"PTT\" in the last 72 hours.  Lab Results   Component Value Date/Time    PROTIME 11.6 07/19/2023 06:13 PM    INR 1.1 07/19/2023 06:13 PM       Last 3 Lipid Panel:  Recent Labs     04/10/24  0405   HDL 45   TRIG 140     Lab Results   Component Value Date    LDLCALC 55 06/15/2022    LDLCALC 61 04/01/2015     Lab Results   Component Value Date    HDL 45 04/10/2024    HDL 43 02/28/2024    HDL 41 12/09/2023     Lab Results   Component Value Date    TRIG 140 04/10/2024    TRIG 93 02/28/2024    TRIG 67 12/09/2023     No components found for: \"CHLPL\"    TSH:  No results for input(s): \"TSH\" in the last 72 hours.  Lab Results   Component Value Date/Time    TSH 3.26 12/09/2023 03:18 AM       ABGs:  No results for input(s): \"PH\", \"PO2\", \"PCO2\", \"HCO3\", \"BE\", \"O2SAT\" in the last 72 hours.    Lactic Acid:  No results for input(s): \"LACTA\" in the last 72 hours.      Radiology:  RAD Results:  XR HIP BILATERAL W AP PELVIS (2 VIEWS)   Final Result   No acute abnormality of the pelvis and bilateral hips.         XR CHEST 1 VIEW   Final Result   1. No acute cardiopulmonary process.   2. Redemonstration of focal opacity in the lateral left lung base.         CT HEAD WO CONTRAST   Final Result   1. No acute intracranial abnormality.   2. Generalized atrophy and chronic small vessel ischemic change. Old lacunar   insult seen within the right basal ganglia.         CT CERVICAL SPINE WO CONTRAST   Final Result   No acute fracture or traumatic malalignment of the cervical spine. Multilevel    degenerative changes seen within the cervical spine.         CT THORACIC SPINE WO CONTRAST   Final Result   No acute osseous abnormality of the thoracolumbar spine, pelvis or left hip.   Degenerative changes as described above.         CT LUMBAR SPINE WO CONTRAST   Final Result   No acute osseous abnormality of the thoracolumbar spine, pelvis or left hip.   Degenerative changes as described above.         CT HIP LEFT WO CONTRAST   Final Result   No acute osseous abnormality of the thoracolumbar spine, pelvis or left hip.   Degenerative changes as described above.                 ASSESSMENT / PLAN:    1. Non-STEMI, asymptomatic no chest pain.  Therefore no benefit for cath.  Normal LV function. continue with dual antiplatelet therapy.  Would continue beta-blocker but watch on telemetry.  Add a statin no other cardiac plans.  Will see as needed call if new concerns    2 left bundle branch block previously seen on EKGs stable    3 diabetes aim for A1c under 7    4 mild dementia on Aricept.    5 hypertension on Cozaar and Toprol hydrochlorothiazide, holding Cozaar for creatinine 2.1    6 previous stroke continue aspirin and Plavix    7 NICK Cr 2.1, azotemic, try holding cozaar , may need to switch to CCB amlodipine 5 mg.          Thank you for consultation.    Jordin Yao MD, [facility]  [facility]    Electronically signed by Jordin Yao MD on 4/12/2024 at 8:07 AM

## 2024-04-12 NOTE — PROGRESS NOTES
education:   Pt verbalized understanding Pt demonstrated skill Pt requires further education in this area   yes With assist yes     Additional Comments: Pt instructed on hand placement and safety with transfers. Difficulty moving LE's to pivot to chair Knees flexed in standing. Pt also stood to ww x2 reps for hygiene due to pure wick malfunction    Pt was left in chair with call light left by patient.    Time in: 0815  Time out: 0830    Total treatment time 15 minutes    Pt is making  progress toward established Physical Therapy goals.  Continue with physical therapy current plan of care.    Selene Burr PT [id number]      CPT codes:  [] Low Complexity PT evaluation 62312  [] Moderate Complexity PT evaluation 47662  [] High Complexity PT evaluation 24679  [] PT Re-evaluation 45925  [] Gait training 71188  minutes  [] Manual therapy 58385    minutes  [x] Therapeutic activities 37994    15 minutes  [] Therapeutic exercises 73620     minutes  [] Neuromuscular reeducation 34942     minutes

## 2024-04-12 NOTE — PLAN OF CARE

## 2024-04-12 NOTE — DISCHARGE SUMMARY
[facility]   Discharge summary   Patient ID:  Marcela Henao  [id number]  88 y.o.  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 female of Jatinder Marquez MD with significant past medical history of *** who presents with ***    Recent Labs     04/10/24  0405 04/11/24  0320 04/12/24  0407   WBC 11.5 7.7 7.9   HGB 14.5 13.7 13.6   HCT 45.6 42.0 41.9    240 250       Recent Labs     04/10/24  0405 04/11/24  0320 04/12/24  0327    136 135   K 3.4* 3.5 3.9    96* 100   CO2 26 28 27   BUN 35* 44* 51*   CREATININE 1.5* 2.1* 2.1*   CALCIUM 9.7 9.4 9.5       Echo (TTE) complete (PRN contrast/bubble/strain/3D)    Result Date: 4/10/2024    Left Ventricle: Normal left ventricular systolic function with a visually estimated EF of 50 - 55%. Left ventricle size is normal. Moderately increased wall thickness. Unable to assess wall motion.   Aortic Valve: Trileaflet valve. Mild annular calcification. Mild to moderate regurgitation.   Left Atrium: Left atrium is mildly dilated.   Image quality is adequate.     XR CHEST 1 VIEW    Result Date: 4/9/2024  EXAMINATION: ONE XRAY VIEW OF THE CHEST 4/9/2024 12:01 pm COMPARISON: February 25, 2024 HISTORY: ORDERING SYSTEM PROVIDED

## 2024-04-13 ENCOUNTER — OUTSIDE SERVICES (OUTPATIENT)
Dept: PRIMARY CARE CLINIC | Age: 89
End: 2024-04-13

## 2024-04-13 DIAGNOSIS — N18.9 ACUTE KIDNEY INJURY SUPERIMPOSED ON CKD (HCC): ICD-10-CM

## 2024-04-13 DIAGNOSIS — N17.9 ACUTE KIDNEY INJURY SUPERIMPOSED ON CKD (HCC): ICD-10-CM

## 2024-04-13 DIAGNOSIS — Z91.81 AT MAXIMUM RISK FOR FALL: ICD-10-CM

## 2024-04-13 DIAGNOSIS — I21.4 NSTEMI (NON-ST ELEVATED MYOCARDIAL INFARCTION) (HCC): Primary | ICD-10-CM

## 2024-04-13 DIAGNOSIS — R29.6 FALLS FREQUENTLY: ICD-10-CM

## 2024-04-13 DIAGNOSIS — R26.2 DIFFICULTY IN WALKING: ICD-10-CM

## 2024-04-13 DIAGNOSIS — F03.90 DEMENTIA, UNSPECIFIED DEMENTIA SEVERITY, UNSPECIFIED DEMENTIA TYPE, UNSPECIFIED WHETHER BEHAVIORAL, PSYCHOTIC, OR MOOD DISTURBANCE OR ANXIETY (HCC): ICD-10-CM

## 2024-04-13 DIAGNOSIS — R53.1 GENERALIZED WEAKNESS: ICD-10-CM

## 2024-04-13 DIAGNOSIS — R53.81 PHYSICAL DECONDITIONING: ICD-10-CM

## 2024-04-13 DIAGNOSIS — R26.2 AMBULATORY DYSFUNCTION: ICD-10-CM

## 2024-04-13 LAB
25(OH)D3 SERPL-MCNC: 58.7 NG/ML (ref 30–100)
ACB COMPLEX DNA BLD POS QL NAA+NON-PROBE: NOT DETECTED
ALBUMIN SERPL-MCNC: 3.4 G/DL (ref 3.5–5.2)
ALP SERPL-CCNC: 66 U/L (ref 35–104)
ALT SERPL-CCNC: 11 U/L (ref 0–32)
ANION GAP SERPL CALCULATED.3IONS-SCNC: 14 MMOL/L (ref 7–16)
AST SERPL-CCNC: 18 U/L (ref 0–31)
B FRAGILIS DNA BLD POS QL NAA+NON-PROBE: NOT DETECTED
BILIRUB SERPL-MCNC: 0.3 MG/DL (ref 0–1.2)
BIOFIRE TEST COMMENT: ABNORMAL
BUN SERPL-MCNC: 42 MG/DL (ref 6–23)
C ALBICANS DNA BLD POS QL NAA+NON-PROBE: NOT DETECTED
C AURIS DNA BLD POS QL NAA+NON-PROBE: NOT DETECTED
C GATTII+NEOFOR DNA BLD POS QL NAA+N-PRB: NOT DETECTED
C GLABRATA DNA BLD POS QL NAA+NON-PROBE: NOT DETECTED
C KRUSEI DNA BLD POS QL NAA+NON-PROBE: NOT DETECTED
C PARAP DNA BLD POS QL NAA+NON-PROBE: NOT DETECTED
C TROPICLS DNA BLD POS QL NAA+NON-PROBE: NOT DETECTED
CALCIUM SERPL-MCNC: 10 MG/DL (ref 8.6–10.2)
CHLORIDE SERPL-SCNC: 99 MMOL/L (ref 98–107)
CHOLEST SERPL-MCNC: 171 MG/DL
CO2 SERPL-SCNC: 25 MMOL/L (ref 22–29)
CREAT SERPL-MCNC: 1.7 MG/DL (ref 0.5–1)
E CLOAC COMP DNA BLD POS NAA+NON-PROBE: NOT DETECTED
E COLI DNA BLD POS QL NAA+NON-PROBE: NOT DETECTED
E FAECALIS DNA BLD POS QL NAA+NON-PROBE: NOT DETECTED
E FAECIUM DNA BLD POS QL NAA+NON-PROBE: NOT DETECTED
ENTEROBACTERALES DNA BLD POS NAA+N-PRB: NOT DETECTED
ERYTHROCYTE [DISTWIDTH] IN BLOOD BY AUTOMATED COUNT: 13.4 % (ref 11.5–15)
GFR SERPL CREATININE-BSD FRML MDRD: 30 ML/MIN/1.73M2
GLUCOSE SERPL-MCNC: 123 MG/DL (ref 74–99)
GP B STREP DNA BLD POS QL NAA+NON-PROBE: NOT DETECTED
HAEM INFLU DNA BLD POS QL NAA+NON-PROBE: NOT DETECTED
HBA1C MFR BLD: 5.8 % (ref 4–5.6)
HCT VFR BLD AUTO: 41.3 % (ref 34–48)
HDLC SERPL-MCNC: 34 MG/DL
HGB BLD-MCNC: 13.4 G/DL (ref 11.5–15.5)
K OXYTOCA DNA BLD POS QL NAA+NON-PROBE: NOT DETECTED
KLEBSIELLA SP DNA BLD POS QL NAA+NON-PRB: NOT DETECTED
KLEBSIELLA SP DNA BLD POS QL NAA+NON-PRB: NOT DETECTED
L MONOCYTOG DNA BLD POS QL NAA+NON-PROBE: NOT DETECTED
LDLC SERPL CALC-MCNC: 106 MG/DL
MCH RBC QN AUTO: 29.8 PG (ref 26–35)
MCHC RBC AUTO-ENTMCNC: 32.4 G/DL (ref 32–34.5)
MCV RBC AUTO: 92 FL (ref 80–99.9)
MECA+MECC ISLT/SPM QL: DETECTED
MICROORGANISM SPEC CULT: ABNORMAL
MICROORGANISM SPEC CULT: ABNORMAL
MICROORGANISM/AGENT SPEC: ABNORMAL
N MEN DNA BLD POS QL NAA+NON-PROBE: NOT DETECTED
P AERUGINOSA DNA BLD POS NAA+NON-PROBE: NOT DETECTED
PLATELET # BLD AUTO: 271 K/UL (ref 130–450)
PMV BLD AUTO: 10.5 FL (ref 7–12)
POTASSIUM SERPL-SCNC: 4.2 MMOL/L (ref 3.5–5)
PROT SERPL-MCNC: 6.2 G/DL (ref 6.4–8.3)
PROTEUS SP DNA BLD POS QL NAA+NON-PROBE: NOT DETECTED
RBC # BLD AUTO: 4.49 M/UL (ref 3.5–5.5)
S AUREUS DNA BLD POS QL NAA+NON-PROBE: NOT DETECTED
S AUREUS+CONS DNA BLD POS NAA+NON-PROBE: DETECTED
S EPIDERMIDIS DNA BLD POS QL NAA+NON-PRB: DETECTED
S LUGDUNENSIS DNA BLD POS QL NAA+NON-PRB: NOT DETECTED
S MALTOPHILIA DNA BLD POS QL NAA+NON-PRB: NOT DETECTED
S MARCESCENS DNA BLD POS NAA+NON-PROBE: NOT DETECTED
S PNEUM DNA BLD POS QL NAA+NON-PROBE: NOT DETECTED
S PYO DNA BLD POS QL NAA+NON-PROBE: NOT DETECTED
SALMONELLA DNA BLD POS QL NAA+NON-PROBE: NOT DETECTED
SERVICE CMNT-IMP: ABNORMAL
SODIUM SERPL-SCNC: 138 MMOL/L (ref 132–146)
SPECIMEN DESCRIPTION: ABNORMAL
STREPTOCOCCUS DNA BLD POS NAA+NON-PROBE: NOT DETECTED
TRIGL SERPL-MCNC: 156 MG/DL
VLDLC SERPL CALC-MCNC: 31 MG/DL
WBC OTHER # BLD: 6.7 K/UL (ref 4.5–11.5)

## 2024-04-13 NOTE — PROGRESS NOTES
04/10/2024    TRIG 93 02/28/2024    TRIG 67 12/09/2023     Lab Results   Component Value Date    HDL 45 04/10/2024    HDL 43 02/28/2024    HDL 41 12/09/2023     Lab Results   Component Value Date    LDLCHOLESTEROL 129 (H) 04/10/2024    LDLCHOLESTEROL 126 (H) 02/28/2024    LDLCHOLESTEROL 103 (H) 12/09/2023    LDLCALC 55 06/15/2022    LDLCALC 61 04/01/2015     Lab Results   Component Value Date    VLDL 28 04/10/2024    VLDL 19 02/28/2024    VLDL 13 12/09/2023     No results found for: \"CHOLHDLRATIO\"  Lab Results   Component Value Date    TSH 3.26 12/09/2023               ASSESSMENT/PLAN:  1. NSTEMI (non-ST elevated myocardial infarction) (HCC)  2. Falls frequently  3. Ambulatory dysfunction  4. Difficulty in walking  5. Acute kidney injury superimposed on CKD (HCC)  6. Dementia, unspecified dementia severity, unspecified dementia type, unspecified whether behavioral, psychotic, or mood disturbance or anxiety (HCC)  7. Generalized weakness  8. Physical deconditioning  9. At maximum risk for fall               Plan: Continue skilled nursing and rehab services.  Labs, notes, and imaging during hospitalization reviewed, collect per facility guidelines.  Medication reconciliation completed, see orders in nursing home chart.  Acute medical issues per NP/physician/physician assistant on call.    Patient was admitted for falls and NSTEMI.  Patient was medically optimized from an NSTEMI standpoint during her hospitalization.  Imaging showed no acute fractures.  Currently, vitals are stable, thus we will continue current dosage of hydrochlorothiazide and losartan, however would consider adjusting or stopping these medications depending on what her creatinine shows as she does have an NICK on CKD currently.  If her creatinine level is still high, would consider sending her back to the emergency department for further evaluation and possibly IV fluids.  Blood cultures did grow an organism, however they stated this was likely

## 2024-04-16 LAB — PROCALCITONIN SERPL-MCNC: 0.07 NG/ML (ref 0–0.08)

## 2024-04-18 LAB
MICROORGANISM SPEC CULT: ABNORMAL
MICROORGANISM/AGENT SPEC: ABNORMAL
SERVICE CMNT-IMP: ABNORMAL
SPECIMEN DESCRIPTION: ABNORMAL

## 2024-04-19 LAB
ALBUMIN SERPL-MCNC: 3.3 G/DL (ref 3.5–5.2)
ALP SERPL-CCNC: 62 U/L (ref 35–104)
ALT SERPL-CCNC: 17 U/L (ref 0–32)
ANION GAP SERPL CALCULATED.3IONS-SCNC: 15 MMOL/L (ref 7–16)
AST SERPL-CCNC: 22 U/L (ref 0–31)
BILIRUB SERPL-MCNC: 0.3 MG/DL (ref 0–1.2)
BUN SERPL-MCNC: 33 MG/DL (ref 6–23)
CALCIUM SERPL-MCNC: 9.6 MG/DL (ref 8.6–10.2)
CHLORIDE SERPL-SCNC: 105 MMOL/L (ref 98–107)
CO2 SERPL-SCNC: 22 MMOL/L (ref 22–29)
CREAT SERPL-MCNC: 1.7 MG/DL (ref 0.5–1)
ERYTHROCYTE [DISTWIDTH] IN BLOOD BY AUTOMATED COUNT: 13.3 % (ref 11.5–15)
GFR SERPL CREATININE-BSD FRML MDRD: 29 ML/MIN/1.73M2
GLUCOSE SERPL-MCNC: 104 MG/DL (ref 74–99)
HCT VFR BLD AUTO: 40.3 % (ref 34–48)
HGB BLD-MCNC: 13 G/DL (ref 11.5–15.5)
MCH RBC QN AUTO: 30.2 PG (ref 26–35)
MCHC RBC AUTO-ENTMCNC: 32.3 G/DL (ref 32–34.5)
MCV RBC AUTO: 93.5 FL (ref 80–99.9)
PLATELET # BLD AUTO: 314 K/UL (ref 130–450)
PMV BLD AUTO: 10.1 FL (ref 7–12)
POTASSIUM SERPL-SCNC: 4 MMOL/L (ref 3.5–5)
PROT SERPL-MCNC: 5.8 G/DL (ref 6.4–8.3)
RBC # BLD AUTO: 4.31 M/UL (ref 3.5–5.5)
SODIUM SERPL-SCNC: 142 MMOL/L (ref 132–146)
WBC OTHER # BLD: 8.4 K/UL (ref 4.5–11.5)

## 2024-04-26 LAB
ALBUMIN SERPL-MCNC: 4.1 G/DL (ref 3.5–5.2)
ALP SERPL-CCNC: 152 U/L (ref 35–104)
ALT SERPL-CCNC: 16 U/L (ref 0–32)
ANION GAP SERPL CALCULATED.3IONS-SCNC: 14 MMOL/L (ref 7–16)
AST SERPL-CCNC: 67 U/L (ref 0–31)
BILIRUB SERPL-MCNC: 0.8 MG/DL (ref 0–1.2)
BUN SERPL-MCNC: 8 MG/DL (ref 6–23)
CALCIUM SERPL-MCNC: 9.6 MG/DL (ref 8.6–10.2)
CHLORIDE SERPL-SCNC: 103 MMOL/L (ref 98–107)
CO2 SERPL-SCNC: 24 MMOL/L (ref 22–29)
CREAT SERPL-MCNC: 0.5 MG/DL (ref 0.5–1)
ERYTHROCYTE [DISTWIDTH] IN BLOOD BY AUTOMATED COUNT: 14.6 % (ref 11.5–15)
GFR SERPL CREATININE-BSD FRML MDRD: >90 ML/MIN/1.73M2
GLUCOSE SERPL-MCNC: 88 MG/DL (ref 74–99)
HCT VFR BLD AUTO: 34.8 % (ref 34–48)
HGB BLD-MCNC: 11.3 G/DL (ref 11.5–15.5)
MCH RBC QN AUTO: 34.7 PG (ref 26–35)
MCHC RBC AUTO-ENTMCNC: 32.5 G/DL (ref 32–34.5)
MCV RBC AUTO: 106.7 FL (ref 80–99.9)
PLATELET # BLD AUTO: 164 K/UL (ref 130–450)
PMV BLD AUTO: 9.3 FL (ref 7–12)
POTASSIUM SERPL-SCNC: 4.1 MMOL/L (ref 3.5–5)
PROT SERPL-MCNC: 7.6 G/DL (ref 6.4–8.3)
RBC # BLD AUTO: 3.26 M/UL (ref 3.5–5.5)
SODIUM SERPL-SCNC: 141 MMOL/L (ref 132–146)
WBC OTHER # BLD: 5.1 K/UL (ref 4.5–11.5)

## 2024-04-29 ENCOUNTER — OUTSIDE SERVICES (OUTPATIENT)
Dept: PRIMARY CARE CLINIC | Age: 89
End: 2024-04-29
Payer: MEDICARE

## 2024-04-29 DIAGNOSIS — R07.9 CHEST PAIN, UNSPECIFIED TYPE: Primary | ICD-10-CM

## 2024-04-29 DIAGNOSIS — E78.5 HYPERLIPIDEMIA WITH TARGET LDL LESS THAN 100: Chronic | ICD-10-CM

## 2024-04-29 DIAGNOSIS — I10 ESSENTIAL HYPERTENSION, BENIGN: Chronic | ICD-10-CM

## 2024-04-29 LAB — TROPONIN I SERPL HS-MCNC: 29 NG/L (ref 0–9)

## 2024-04-29 PROCEDURE — 99309 SBSQ NF CARE MODERATE MDM 30: CPT | Performed by: STUDENT IN AN ORGANIZED HEALTH CARE EDUCATION/TRAINING PROGRAM

## 2024-04-29 NOTE — PROGRESS NOTES
Marcela Henao (:  1935) is a 88 y.o. female.    Subjective   SUBJECTIVE/OBJECTIVE:  HPI  Location: [address]  Nursing and progress notes reviewed.    Patient states she is having chest pain which is pressure-like midsternal.  She denies any lightheadedness, dizziness, or shortness of breath associated with the chest pain.  She states the chest pain started this morning.     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   Constitutional:       General: She is not in acute distress.     Appearance: Normal appearance. She is not ill-appearing.   HENT:      Head: Normocephalic and atraumatic.      Right Ear: External ear normal.      Left Ear: External ear normal.      Nose: Nose normal.   Eyes:      Extraocular Movements: Extraocular movements intact.      Conjunctiva/sclera: Conjunctivae normal.   Pulmonary:      Effort: Pulmonary effort is normal.   Skin:     Findings: Rash present.      Comments: Slight erythema on back from previous rash    Neurological:      Mental Status: She is alert.         Recent Labs     24  0500 24  0538 24  0821   WBC 5.1 8.4 6.7   HGB 11.3* 13.0 13.4   HCT 34.8 40.3 41.3   .7* 93.5 92.0    314 271      Lab Results   Component Value Date     2024    K 4.1 2024     2024    CO2 24 2024    BUN 8 2024    CREATININE 0.5 2024    GLUCOSE 88 2024    CALCIUM 9.6 2024    PROT 7.6 2024    BILITOT 0.8 2024    ALKPHOS 152 (H) 2024    AST 67 (H) 2024    ALT 16 2024    LABGLOM >90 2024

## 2024-05-03 LAB
ALBUMIN SERPL-MCNC: 3.9 G/DL (ref 3.5–5.2)
ALP SERPL-CCNC: 141 U/L (ref 35–104)
ALT SERPL-CCNC: 14 U/L (ref 0–32)
ANION GAP SERPL CALCULATED.3IONS-SCNC: 16 MMOL/L (ref 7–16)
AST SERPL-CCNC: 56 U/L (ref 0–31)
BILIRUB SERPL-MCNC: 0.7 MG/DL (ref 0–1.2)
BUN SERPL-MCNC: 11 MG/DL (ref 6–23)
CALCIUM SERPL-MCNC: 9.2 MG/DL (ref 8.6–10.2)
CHLORIDE SERPL-SCNC: 102 MMOL/L (ref 98–107)
CO2 SERPL-SCNC: 24 MMOL/L (ref 22–29)
CREAT SERPL-MCNC: 0.5 MG/DL (ref 0.5–1)
ERYTHROCYTE [DISTWIDTH] IN BLOOD BY AUTOMATED COUNT: 14.5 % (ref 11.5–15)
GFR, ESTIMATED: >90 ML/MIN/1.73M2
GLUCOSE SERPL-MCNC: 83 MG/DL (ref 74–99)
HCT VFR BLD AUTO: 31.7 % (ref 34–48)
HGB BLD-MCNC: 10.2 G/DL (ref 11.5–15.5)
MCH RBC QN AUTO: 34.5 PG (ref 26–35)
MCHC RBC AUTO-ENTMCNC: 32.2 G/DL (ref 32–34.5)
MCV RBC AUTO: 107.1 FL (ref 80–99.9)
PLATELET # BLD AUTO: 130 K/UL (ref 130–450)
PMV BLD AUTO: 9.2 FL (ref 7–12)
POTASSIUM SERPL-SCNC: 4.2 MMOL/L (ref 3.5–5)
PROT SERPL-MCNC: 7 G/DL (ref 6.4–8.3)
RBC # BLD AUTO: 2.96 M/UL (ref 3.5–5.5)
SODIUM SERPL-SCNC: 142 MMOL/L (ref 132–146)
WBC OTHER # BLD: 5.5 K/UL (ref 4.5–11.5)

## 2024-05-06 ENCOUNTER — HOSPITAL ENCOUNTER (INPATIENT)
Age: 89
LOS: 4 days | Discharge: NURSING, REHAB OR LONG-TERM CARE | End: 2024-05-10
Attending: EMERGENCY MEDICINE | Admitting: INTERNAL MEDICINE
Payer: MEDICARE

## 2024-05-06 ENCOUNTER — APPOINTMENT (OUTPATIENT)
Dept: CT IMAGING | Age: 89
End: 2024-05-06
Payer: MEDICARE

## 2024-05-06 ENCOUNTER — APPOINTMENT (OUTPATIENT)
Dept: GENERAL RADIOLOGY | Age: 89
End: 2024-05-06
Payer: MEDICARE

## 2024-05-06 DIAGNOSIS — F03.B0 MODERATE DEMENTIA, UNSPECIFIED DEMENTIA TYPE, UNSPECIFIED WHETHER BEHAVIORAL, PSYCHOTIC, OR MOOD DISTURBANCE OR ANXIETY (HCC): ICD-10-CM

## 2024-05-06 DIAGNOSIS — R53.1 GENERAL WEAKNESS: Primary | ICD-10-CM

## 2024-05-06 DIAGNOSIS — R62.7 FAILURE TO THRIVE IN ADULT: ICD-10-CM

## 2024-05-06 LAB
ALBUMIN SERPL-MCNC: 3.9 G/DL (ref 3.5–5.2)
ALP SERPL-CCNC: 75 U/L (ref 35–104)
ALT SERPL-CCNC: 15 U/L (ref 0–32)
ANION GAP SERPL CALCULATED.3IONS-SCNC: 9 MMOL/L (ref 7–16)
AST SERPL-CCNC: 21 U/L (ref 0–31)
BASOPHILS # BLD: 0.06 K/UL (ref 0–0.2)
BASOPHILS NFR BLD: 1 % (ref 0–2)
BILIRUB SERPL-MCNC: 0.6 MG/DL (ref 0–1.2)
BILIRUB UR QL STRIP: NEGATIVE
BUN SERPL-MCNC: 33 MG/DL (ref 6–23)
CALCIUM SERPL-MCNC: 10.4 MG/DL (ref 8.6–10.2)
CHLORIDE SERPL-SCNC: 100 MMOL/L (ref 98–107)
CLARITY UR: CLEAR
CO2 SERPL-SCNC: 28 MMOL/L (ref 22–29)
COLOR UR: YELLOW
CREAT SERPL-MCNC: 1.4 MG/DL (ref 0.5–1)
EKG ATRIAL RATE: 73 BPM
EKG P AXIS: -3 DEGREES
EKG P-R INTERVAL: 238 MS
EKG Q-T INTERVAL: 450 MS
EKG QRS DURATION: 136 MS
EKG QTC CALCULATION (BAZETT): 495 MS
EKG R AXIS: -24 DEGREES
EKG T AXIS: 152 DEGREES
EKG VENTRICULAR RATE: 73 BPM
EOSINOPHIL # BLD: 0.36 K/UL (ref 0.05–0.5)
EOSINOPHILS RELATIVE PERCENT: 3 % (ref 0–6)
ERYTHROCYTE [DISTWIDTH] IN BLOOD BY AUTOMATED COUNT: 13.1 % (ref 11.5–15)
GFR, ESTIMATED: 37 ML/MIN/1.73M2
GLUCOSE SERPL-MCNC: 116 MG/DL (ref 74–99)
GLUCOSE UR STRIP-MCNC: NEGATIVE MG/DL
HCT VFR BLD AUTO: 45 % (ref 34–48)
HGB BLD-MCNC: 14.6 G/DL (ref 11.5–15.5)
HGB UR QL STRIP.AUTO: ABNORMAL
IMM GRANULOCYTES # BLD AUTO: 0.05 K/UL (ref 0–0.58)
IMM GRANULOCYTES NFR BLD: 1 % (ref 0–5)
KETONES UR STRIP-MCNC: 15 MG/DL
LACTATE BLDV-SCNC: 1.3 MMOL/L (ref 0.5–2.2)
LEUKOCYTE ESTERASE UR QL STRIP: ABNORMAL
LYMPHOCYTES NFR BLD: 1.43 K/UL (ref 1.5–4)
LYMPHOCYTES RELATIVE PERCENT: 13 % (ref 20–42)
MAGNESIUM SERPL-MCNC: 1.8 MG/DL (ref 1.6–2.6)
MCH RBC QN AUTO: 29.7 PG (ref 26–35)
MCHC RBC AUTO-ENTMCNC: 32.4 G/DL (ref 32–34.5)
MCV RBC AUTO: 91.6 FL (ref 80–99.9)
MONOCYTES NFR BLD: 1 K/UL (ref 0.1–0.95)
MONOCYTES NFR BLD: 9 % (ref 2–12)
NEUTROPHILS NFR BLD: 73 % (ref 43–80)
NEUTS SEG NFR BLD: 8.01 K/UL (ref 1.8–7.3)
NITRITE UR QL STRIP: NEGATIVE
PH UR STRIP: 6 [PH] (ref 5–9)
PLATELET # BLD AUTO: 254 K/UL (ref 130–450)
PMV BLD AUTO: 9.6 FL (ref 7–12)
POTASSIUM SERPL-SCNC: 3.8 MMOL/L (ref 3.5–5)
PROT SERPL-MCNC: 6.9 G/DL (ref 6.4–8.3)
PROT UR STRIP-MCNC: 30 MG/DL
RBC # BLD AUTO: 4.91 M/UL (ref 3.5–5.5)
RBC #/AREA URNS HPF: NORMAL /HPF
SODIUM SERPL-SCNC: 137 MMOL/L (ref 132–146)
SP GR UR STRIP: 1.02 (ref 1–1.03)
TROPONIN I SERPL HS-MCNC: 36 NG/L (ref 0–9)
UROBILINOGEN UR STRIP-ACNC: 0.2 EU/DL (ref 0–1)
WBC #/AREA URNS HPF: NORMAL /HPF
WBC OTHER # BLD: 10.9 K/UL (ref 4.5–11.5)

## 2024-05-06 PROCEDURE — 6370000000 HC RX 637 (ALT 250 FOR IP)

## 2024-05-06 PROCEDURE — 70450 CT HEAD/BRAIN W/O DYE: CPT

## 2024-05-06 PROCEDURE — 71045 X-RAY EXAM CHEST 1 VIEW: CPT

## 2024-05-06 PROCEDURE — 85025 COMPLETE CBC W/AUTO DIFF WBC: CPT

## 2024-05-06 PROCEDURE — 93005 ELECTROCARDIOGRAM TRACING: CPT

## 2024-05-06 PROCEDURE — 81001 URINALYSIS AUTO W/SCOPE: CPT

## 2024-05-06 PROCEDURE — 93010 ELECTROCARDIOGRAM REPORT: CPT | Performed by: INTERNAL MEDICINE

## 2024-05-06 PROCEDURE — 2580000003 HC RX 258

## 2024-05-06 PROCEDURE — 83735 ASSAY OF MAGNESIUM: CPT

## 2024-05-06 PROCEDURE — 80053 COMPREHEN METABOLIC PANEL: CPT

## 2024-05-06 PROCEDURE — 84484 ASSAY OF TROPONIN QUANT: CPT

## 2024-05-06 PROCEDURE — 83605 ASSAY OF LACTIC ACID: CPT

## 2024-05-06 PROCEDURE — 2060000000 HC ICU INTERMEDIATE R&B

## 2024-05-06 PROCEDURE — 99285 EMERGENCY DEPT VISIT HI MDM: CPT

## 2024-05-06 PROCEDURE — 73562 X-RAY EXAM OF KNEE 3: CPT

## 2024-05-06 PROCEDURE — 6360000002 HC RX W HCPCS

## 2024-05-06 RX ORDER — SODIUM CHLORIDE 9 MG/ML
INJECTION, SOLUTION INTRAVENOUS PRN
Status: DISCONTINUED | OUTPATIENT
Start: 2024-05-06 | End: 2024-05-10 | Stop reason: HOSPADM

## 2024-05-06 RX ORDER — ASPIRIN 81 MG/1
81 TABLET ORAL EVERY MORNING
Status: DISCONTINUED | OUTPATIENT
Start: 2024-05-07 | End: 2024-05-10 | Stop reason: HOSPADM

## 2024-05-06 RX ORDER — POTASSIUM CHLORIDE 7.45 MG/ML
10 INJECTION INTRAVENOUS PRN
Status: DISCONTINUED | OUTPATIENT
Start: 2024-05-06 | End: 2024-05-10 | Stop reason: HOSPADM

## 2024-05-06 RX ORDER — LOSARTAN POTASSIUM 50 MG/1
50 TABLET ORAL NIGHTLY
Status: DISCONTINUED | OUTPATIENT
Start: 2024-05-06 | End: 2024-05-10

## 2024-05-06 RX ORDER — HYDROCHLOROTHIAZIDE 25 MG/1
25 TABLET ORAL EVERY MORNING
Status: DISCONTINUED | OUTPATIENT
Start: 2024-05-07 | End: 2024-05-10

## 2024-05-06 RX ORDER — MAGNESIUM SULFATE IN WATER 40 MG/ML
2000 INJECTION, SOLUTION INTRAVENOUS PRN
Status: DISCONTINUED | OUTPATIENT
Start: 2024-05-06 | End: 2024-05-10 | Stop reason: HOSPADM

## 2024-05-06 RX ORDER — ONDANSETRON 4 MG/1
4 TABLET, ORALLY DISINTEGRATING ORAL EVERY 8 HOURS PRN
Status: DISCONTINUED | OUTPATIENT
Start: 2024-05-06 | End: 2024-05-10 | Stop reason: HOSPADM

## 2024-05-06 RX ORDER — ATORVASTATIN CALCIUM 40 MG/1
40 TABLET, FILM COATED ORAL NIGHTLY
Status: DISCONTINUED | OUTPATIENT
Start: 2024-05-06 | End: 2024-05-10 | Stop reason: HOSPADM

## 2024-05-06 RX ORDER — POTASSIUM CHLORIDE 20 MEQ/1
20 TABLET, EXTENDED RELEASE ORAL EVERY MORNING
Status: DISCONTINUED | OUTPATIENT
Start: 2024-05-07 | End: 2024-05-10 | Stop reason: HOSPADM

## 2024-05-06 RX ORDER — SODIUM CHLORIDE 0.9 % (FLUSH) 0.9 %
10 SYRINGE (ML) INJECTION PRN
Status: DISCONTINUED | OUTPATIENT
Start: 2024-05-06 | End: 2024-05-10 | Stop reason: HOSPADM

## 2024-05-06 RX ORDER — SODIUM CHLORIDE 0.9 % (FLUSH) 0.9 %
5-40 SYRINGE (ML) INJECTION EVERY 12 HOURS SCHEDULED
Status: DISCONTINUED | OUTPATIENT
Start: 2024-05-06 | End: 2024-05-10 | Stop reason: HOSPADM

## 2024-05-06 RX ORDER — CHOLECALCIFEROL (VITAMIN D3) 50 MCG
2000 TABLET ORAL EVERY MORNING
Status: DISCONTINUED | OUTPATIENT
Start: 2024-05-07 | End: 2024-05-10 | Stop reason: HOSPADM

## 2024-05-06 RX ORDER — FOLIC ACID 1 MG/1
1 TABLET ORAL EVERY MORNING
Status: DISCONTINUED | OUTPATIENT
Start: 2024-05-07 | End: 2024-05-10 | Stop reason: HOSPADM

## 2024-05-06 RX ORDER — CLOPIDOGREL BISULFATE 75 MG/1
75 TABLET ORAL EVERY MORNING
Status: DISCONTINUED | OUTPATIENT
Start: 2024-05-07 | End: 2024-05-10 | Stop reason: HOSPADM

## 2024-05-06 RX ORDER — POTASSIUM CHLORIDE 20 MEQ/1
40 TABLET, EXTENDED RELEASE ORAL PRN
Status: DISCONTINUED | OUTPATIENT
Start: 2024-05-06 | End: 2024-05-10 | Stop reason: HOSPADM

## 2024-05-06 RX ORDER — ALOGLIPTIN 12.5 MG/1
12.5 TABLET, FILM COATED ORAL DAILY
Status: DISCONTINUED | OUTPATIENT
Start: 2024-05-07 | End: 2024-05-10 | Stop reason: HOSPADM

## 2024-05-06 RX ORDER — 0.9 % SODIUM CHLORIDE 0.9 %
500 INTRAVENOUS SOLUTION INTRAVENOUS ONCE
Status: COMPLETED | OUTPATIENT
Start: 2024-05-06 | End: 2024-05-06

## 2024-05-06 RX ORDER — ACETAMINOPHEN 650 MG/1
650 SUPPOSITORY RECTAL EVERY 6 HOURS PRN
Status: DISCONTINUED | OUTPATIENT
Start: 2024-05-06 | End: 2024-05-10 | Stop reason: HOSPADM

## 2024-05-06 RX ORDER — SODIUM CHLORIDE 9 MG/ML
INJECTION, SOLUTION INTRAVENOUS CONTINUOUS
Status: DISCONTINUED | OUTPATIENT
Start: 2024-05-06 | End: 2024-05-09

## 2024-05-06 RX ORDER — ONDANSETRON 2 MG/ML
4 INJECTION INTRAMUSCULAR; INTRAVENOUS EVERY 6 HOURS PRN
Status: DISCONTINUED | OUTPATIENT
Start: 2024-05-06 | End: 2024-05-10 | Stop reason: HOSPADM

## 2024-05-06 RX ORDER — ENOXAPARIN SODIUM 100 MG/ML
40 INJECTION SUBCUTANEOUS DAILY
Status: DISCONTINUED | OUTPATIENT
Start: 2024-05-06 | End: 2024-05-10 | Stop reason: HOSPADM

## 2024-05-06 RX ORDER — POLYETHYLENE GLYCOL 3350 17 G/17G
17 POWDER, FOR SOLUTION ORAL DAILY PRN
Status: DISCONTINUED | OUTPATIENT
Start: 2024-05-06 | End: 2024-05-10 | Stop reason: HOSPADM

## 2024-05-06 RX ORDER — GLIPIZIDE 5 MG/1
5 TABLET ORAL EVERY MORNING
Status: DISCONTINUED | OUTPATIENT
Start: 2024-05-07 | End: 2024-05-10 | Stop reason: HOSPADM

## 2024-05-06 RX ORDER — AMLODIPINE BESYLATE 5 MG/1
5 TABLET ORAL DAILY
Status: DISCONTINUED | OUTPATIENT
Start: 2024-05-07 | End: 2024-05-10 | Stop reason: HOSPADM

## 2024-05-06 RX ORDER — METOPROLOL SUCCINATE 25 MG/1
25 TABLET, EXTENDED RELEASE ORAL NIGHTLY
Status: DISCONTINUED | OUTPATIENT
Start: 2024-05-06 | End: 2024-05-10 | Stop reason: HOSPADM

## 2024-05-06 RX ORDER — DONEPEZIL HYDROCHLORIDE 5 MG/1
5 TABLET, FILM COATED ORAL EVERY MORNING
Status: DISCONTINUED | OUTPATIENT
Start: 2024-05-07 | End: 2024-05-10 | Stop reason: HOSPADM

## 2024-05-06 RX ORDER — TROSPIUM CHLORIDE 20 MG/1
20 TABLET, FILM COATED ORAL
Status: DISCONTINUED | OUTPATIENT
Start: 2024-05-07 | End: 2024-05-10 | Stop reason: HOSPADM

## 2024-05-06 RX ORDER — ACETAMINOPHEN 325 MG/1
650 TABLET ORAL EVERY 6 HOURS PRN
Status: DISCONTINUED | OUTPATIENT
Start: 2024-05-06 | End: 2024-05-10 | Stop reason: HOSPADM

## 2024-05-06 RX ORDER — FLUOXETINE HYDROCHLORIDE 20 MG/1
40 CAPSULE ORAL EVERY MORNING
Status: DISCONTINUED | OUTPATIENT
Start: 2024-05-07 | End: 2024-05-10 | Stop reason: HOSPADM

## 2024-05-06 RX ADMIN — SODIUM CHLORIDE 500 ML: 9 INJECTION, SOLUTION INTRAVENOUS at 16:53

## 2024-05-06 RX ADMIN — LOSARTAN POTASSIUM 50 MG: 50 TABLET, FILM COATED ORAL at 20:26

## 2024-05-06 RX ADMIN — METOPROLOL SUCCINATE 25 MG: 25 TABLET, EXTENDED RELEASE ORAL at 20:26

## 2024-05-06 RX ADMIN — ATORVASTATIN CALCIUM 40 MG: 40 TABLET, FILM COATED ORAL at 20:26

## 2024-05-06 RX ADMIN — SODIUM CHLORIDE: 9 INJECTION, SOLUTION INTRAVENOUS at 20:10

## 2024-05-06 RX ADMIN — ENOXAPARIN SODIUM 40 MG: 100 INJECTION SUBCUTANEOUS at 20:26

## 2024-05-06 RX ADMIN — SODIUM CHLORIDE, PRESERVATIVE FREE 10 ML: 5 INJECTION INTRAVENOUS at 20:26

## 2024-05-06 ASSESSMENT — ENCOUNTER SYMPTOMS
COUGH: 0
CONSTIPATION: 1
ABDOMINAL PAIN: 0
WHEEZING: 0
BLOOD IN STOOL: 0
SORE THROAT: 0
VOMITING: 0
ANAL BLEEDING: 0
RHINORRHEA: 0
DIARRHEA: 0
NAUSEA: 1
SHORTNESS OF BREATH: 0

## 2024-05-06 ASSESSMENT — PAIN - FUNCTIONAL ASSESSMENT: PAIN_FUNCTIONAL_ASSESSMENT: NONE - DENIES PAIN

## 2024-05-06 NOTE — ED PROVIDER NOTES
Department of Emergency Medicine   ED  Provider Note    Pt Name: Marcela Henao         MRN: [id number]         Birthdate 1935    Admit Date/RoomTime: 5/6/2024  1:48 PM  ED Room: 06/06      Chief Complaint   Patient presents with    Failure To Thrive        Patient is an 88-year-old female with past medical history of diabetes mellitus, hypertension, hyperlipidemia, carotid stenosis, previous CVA, dementia who presented to the emergency department due to concerns of weakness and failure to thrive.  Patient states that she came from a nursing facility and went home 3 days ago but since going home has been unable to take care of herself very well she has felt lower extremity weakness to the point where she \"cannot walk.\"  Patient also states that she is unsure the last time she urinated or had a bowel movement was.  Patient states she has been eating and drinking okay.  Patient denies any other symptoms aside from feeling \"weak and off.\"  Patient unsure if she has had falls.  Spoke with patient's  he states that over the last 2 days Saturday and Sunday patient developed weakness had decreased ability to ambulate.  Patient and  are hoping for placement to return to [facility].  Spoke with social work who states patient will need admitted for placement.    The history is provided by the patient and the spouse.        Marcela Henao is a 88 y.o. female with PMHx of DM, HLD, HTN, carotid stenosis, CVA, dementia  presenting to the ED for FTT, came from [facility]     Of note recently admitted 4/9/24-2/12/24 for NSTEMI      Suspect weakness due to deconditioning.     Review of Systems   Constitutional:  Positive for activity change and fatigue. Negative for appetite change, chills and fever.   HENT:  Negative for congestion, rhinorrhea and sore throat.    Respiratory:  Negative for cough, shortness of breath and wheezing.    Cardiovascular:  Negative for chest pain, palpitations and leg swelling.

## 2024-05-06 NOTE — ED NOTES
415 CLEAN  
Bladder scan complete.  136ml  
ED to Inpatient Handoff Report    Notified Tatiana that electronic handoff available and patient ready for transport to room 415.    Safety Risks: Risk of falls    Patient in Restraints: no    Constant Observer or Patient : no    Telemetry Monitoring Ordered: No          Order to transfer to unit without monitor: NA    Last MEWS: 1 Time completed: 1848    Deterioration Index: 29.15    Vitals:    05/06/24 1410 05/06/24 1810 05/06/24 1848 05/06/24 1850   BP: (!) 173/78 (!) 177/74 (!) 180/88 (!) 180/88   Pulse: 74 76 76 77   Resp: 16 17 18 20   Temp: 98.6 °F (37 °C)  98.5 °F (36.9 °C)    TempSrc: Oral  Oral    SpO2: 99% 100% 100% 100%   Weight: 77.1 kg (170 lb)      Height: 1.676 m (5' 6\")          Opportunity for questions and clarification was provided.    
Pt unable to stand for orthostatic bp/hr.  
Pt was at [facility], and decided with  that they could handle everything from home. Less than a week at home, and they realized that she could not receive the appropriate care at home.   
Report given to TEMITOPE Fields. Patient care relinquished.   
clindamycin

## 2024-05-06 NOTE — PROGRESS NOTES
Database initiated. Patient is alert with some confusion said it was 2005. She comes in from home with . States she uses a walker and is RA at baseline. She has fallen recently and is currently unable to ambulate.

## 2024-05-07 LAB
ANION GAP SERPL CALCULATED.3IONS-SCNC: 9 MMOL/L (ref 7–16)
BASOPHILS # BLD: 0.04 K/UL (ref 0–0.2)
BASOPHILS NFR BLD: 1 % (ref 0–2)
BUN SERPL-MCNC: 27 MG/DL (ref 6–23)
CALCIUM SERPL-MCNC: 9.7 MG/DL (ref 8.6–10.2)
CHLORIDE SERPL-SCNC: 103 MMOL/L (ref 98–107)
CO2 SERPL-SCNC: 28 MMOL/L (ref 22–29)
CREAT SERPL-MCNC: 1.2 MG/DL (ref 0.5–1)
EOSINOPHIL # BLD: 0.55 K/UL (ref 0.05–0.5)
EOSINOPHILS RELATIVE PERCENT: 6 % (ref 0–6)
ERYTHROCYTE [DISTWIDTH] IN BLOOD BY AUTOMATED COUNT: 13 % (ref 11.5–15)
GFR, ESTIMATED: 44 ML/MIN/1.73M2
GLUCOSE SERPL-MCNC: 84 MG/DL (ref 74–99)
HCT VFR BLD AUTO: 40.6 % (ref 34–48)
HGB BLD-MCNC: 13.2 G/DL (ref 11.5–15.5)
IMM GRANULOCYTES # BLD AUTO: 0.03 K/UL (ref 0–0.58)
IMM GRANULOCYTES NFR BLD: 0 % (ref 0–5)
LYMPHOCYTES NFR BLD: 1.39 K/UL (ref 1.5–4)
LYMPHOCYTES RELATIVE PERCENT: 16 % (ref 20–42)
MAGNESIUM SERPL-MCNC: 1.6 MG/DL (ref 1.6–2.6)
MCH RBC QN AUTO: 30.3 PG (ref 26–35)
MCHC RBC AUTO-ENTMCNC: 32.5 G/DL (ref 32–34.5)
MCV RBC AUTO: 93.3 FL (ref 80–99.9)
MONOCYTES NFR BLD: 0.87 K/UL (ref 0.1–0.95)
MONOCYTES NFR BLD: 10 % (ref 2–12)
NEUTROPHILS NFR BLD: 67 % (ref 43–80)
NEUTS SEG NFR BLD: 5.75 K/UL (ref 1.8–7.3)
PLATELET # BLD AUTO: 226 K/UL (ref 130–450)
PMV BLD AUTO: 9.8 FL (ref 7–12)
POTASSIUM SERPL-SCNC: 3.3 MMOL/L (ref 3.5–5)
RBC # BLD AUTO: 4.35 M/UL (ref 3.5–5.5)
SODIUM SERPL-SCNC: 140 MMOL/L (ref 132–146)
WBC OTHER # BLD: 8.6 K/UL (ref 4.5–11.5)

## 2024-05-07 PROCEDURE — 6370000000 HC RX 637 (ALT 250 FOR IP): Performed by: NURSE PRACTITIONER

## 2024-05-07 PROCEDURE — 2580000003 HC RX 258

## 2024-05-07 PROCEDURE — 80048 BASIC METABOLIC PNL TOTAL CA: CPT

## 2024-05-07 PROCEDURE — 97165 OT EVAL LOW COMPLEX 30 MIN: CPT

## 2024-05-07 PROCEDURE — 83735 ASSAY OF MAGNESIUM: CPT

## 2024-05-07 PROCEDURE — 2060000000 HC ICU INTERMEDIATE R&B

## 2024-05-07 PROCEDURE — 6360000002 HC RX W HCPCS

## 2024-05-07 PROCEDURE — 85025 COMPLETE CBC W/AUTO DIFF WBC: CPT

## 2024-05-07 PROCEDURE — 97530 THERAPEUTIC ACTIVITIES: CPT

## 2024-05-07 PROCEDURE — 6370000000 HC RX 637 (ALT 250 FOR IP)

## 2024-05-07 RX ORDER — LANOLIN ALCOHOL/MO/W.PET/CERES
3 CREAM (GRAM) TOPICAL NIGHTLY PRN
Status: DISCONTINUED | OUTPATIENT
Start: 2024-05-07 | End: 2024-05-10 | Stop reason: HOSPADM

## 2024-05-07 RX ORDER — ZINC OXIDE 20 %
OINTMENT (GRAM) TOPICAL 4 TIMES DAILY PRN
Status: DISCONTINUED | OUTPATIENT
Start: 2024-05-07 | End: 2024-05-10 | Stop reason: HOSPADM

## 2024-05-07 RX ADMIN — METOPROLOL SUCCINATE 25 MG: 25 TABLET, EXTENDED RELEASE ORAL at 20:59

## 2024-05-07 RX ADMIN — SODIUM CHLORIDE, PRESERVATIVE FREE 10 ML: 5 INJECTION INTRAVENOUS at 08:16

## 2024-05-07 RX ADMIN — SODIUM CHLORIDE: 9 INJECTION, SOLUTION INTRAVENOUS at 16:19

## 2024-05-07 RX ADMIN — GLIPIZIDE 5 MG: 5 TABLET ORAL at 08:15

## 2024-05-07 RX ADMIN — AMLODIPINE BESYLATE 5 MG: 5 TABLET ORAL at 08:19

## 2024-05-07 RX ADMIN — FOLIC ACID 1 MG: 1 TABLET ORAL at 08:15

## 2024-05-07 RX ADMIN — HYDROCHLOROTHIAZIDE 25 MG: 25 TABLET ORAL at 08:15

## 2024-05-07 RX ADMIN — FLUOXETINE HYDROCHLORIDE 40 MG: 20 CAPSULE ORAL at 08:15

## 2024-05-07 RX ADMIN — ATORVASTATIN CALCIUM 40 MG: 40 TABLET, FILM COATED ORAL at 20:59

## 2024-05-07 RX ADMIN — ASPIRIN 81 MG: 81 TABLET, COATED ORAL at 08:15

## 2024-05-07 RX ADMIN — CLOPIDOGREL BISULFATE 75 MG: 75 TABLET ORAL at 08:16

## 2024-05-07 RX ADMIN — POTASSIUM CHLORIDE 20 MEQ: 1500 TABLET, EXTENDED RELEASE ORAL at 08:16

## 2024-05-07 RX ADMIN — DONEPEZIL HYDROCHLORIDE 5 MG: 5 TABLET, FILM COATED ORAL at 08:16

## 2024-05-07 RX ADMIN — Medication 2000 UNITS: at 08:16

## 2024-05-07 RX ADMIN — ENOXAPARIN SODIUM 40 MG: 100 INJECTION SUBCUTANEOUS at 20:59

## 2024-05-07 RX ADMIN — SODIUM CHLORIDE, PRESERVATIVE FREE 10 ML: 5 INJECTION INTRAVENOUS at 21:00

## 2024-05-07 RX ADMIN — TROSPIUM CHLORIDE 20 MG: 20 TABLET, FILM COATED ORAL at 16:17

## 2024-05-07 RX ADMIN — TROSPIUM CHLORIDE 20 MG: 20 TABLET, FILM COATED ORAL at 05:13

## 2024-05-07 RX ADMIN — MAGNESIUM SULFATE HEPTAHYDRATE 2000 MG: 40 INJECTION, SOLUTION INTRAVENOUS at 13:55

## 2024-05-07 RX ADMIN — LOSARTAN POTASSIUM 50 MG: 50 TABLET, FILM COATED ORAL at 20:59

## 2024-05-07 RX ADMIN — ALOGLIPTIN 12.5 MG: 12.5 TABLET, FILM COATED ORAL at 08:15

## 2024-05-07 RX ADMIN — Medication 3 MG: at 23:20

## 2024-05-07 RX ADMIN — POTASSIUM CHLORIDE 40 MEQ: 1500 TABLET, EXTENDED RELEASE ORAL at 13:50

## 2024-05-07 NOTE — H&P
[facility]  History and Physical      CHIEF COMPLAINT:    Chief Complaint   Patient presents with    Failure To Thrive        Patient of Jatinder Marquez MD presents with:  Weakness    History of Present Illness:   Patient is a 88-year-old female with past medical history of DM, HLD, HTN, CVA, dementia who presents to the emergency room for failure to thrive.  Patient states that she was at a nursing home and was discharged home for 3 days however was unable to care for self at home and felt lower extremity weakness to the point where she could not walk.  On arrival to emergency labs revealed an NICK of 33/1.4.  A CT chest x-ray were obtained which were negative for anything acute. A X-ray of the right knee revealed no acute osseous abnormality however moderate-severe left knee DJD.  Patient is admitted to intermediate telemetry and for further workup and treatment.  She is resting comfortably in no acute distress.  No family members are at bedside.  Aside from weakness she has no other acute complaints.    REVIEW OF SYSTEMS:  Pertinent negatives are above in HPI.  10 point ROS otherwise negative.      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    Stroke (HCC)          Past Surgical History:   Procedure Laterality Date     SECTION      CHOLECYSTECTOMY      COLONOSCOPY      EYE SURGERY      bilateral w/ implants    HYSTERECTOMY (CERVIX STATUS UNKNOWN)         Medications Prior to Admission:    Medications Prior to Admission: atorvastatin (LIPITOR) 40 MG tablet, Take 1 tablet by mouth nightly  losartan (COZAAR) 50 MG tablet, Take 1 tablet by mouth nightly  amLODIPine (NORVASC) 5 MG tablet, Take 1 tablet by mouth daily  white petrolatum OINT ointment, Apply topically in the  mellitus  -Continue home glipizide and Nesina  -Monitor blood glucose levels     Medication for other comorbidities continue as appropriate dose adjustment as necessary.    DVT prophylaxis Lovenox  PT OT  Discharge planning      Code status: Full  Requires inpatient level of care      I have spent a total time of 60 minutes of this patient encounter reviewing chart, labs, coordinating care with interdisciplinary teams, face to face encounter with patient, providing counseling/education to patient/family.      Brenda Muse MD  5/7/2024

## 2024-05-07 NOTE — CARE COORDINATION
Social Work:    Chart reviewed. Mrs. Henao was re-admitted from home due to failure to thrive.  She has a history of a CVA, HTN, DM, dementia.  Social service met with Mrs. Henao (Marcela) and spoke with Mr. Henao (Talon) via phone. Social work explained her role and discussed discharge planning. Marcela is a patient of Dr. Marquez.  She resides with Talon in a 2-story home with 2 entry steps, bedroom/bathroom located on the main floor.  Marcela was just recently released from [facility] and had once visit with [facility] home care. Talon advises that Marcela was ambulating well initially but declined fast and was unable to ambulate.  Both Marcela and Talon prefer to return to [facility].  Talon advised social work that he completed paper work that will permit help at home 3 x's a week through a senior szymanski.  Talon also advises that he is initiating a medicaid application. Talon hopes to have Marcela return home with more help in place.  Social work called a referral to Lainey at [facility] but they have no open beds.  Social work updated Talon and he is hoping a bed will open for his wife.  Social work encouraged further snf choices from Talon who wanted time to discuss with his son & sister-in-law.  Social work to follow.    Electronically signed by JERMAINE Bragg on 5/7/2024 at 1:16 PM

## 2024-05-07 NOTE — PROGRESS NOTES
Occupational Therapy  OCCUPATIONAL THERAPY INITIAL EVALUATION  [facility]  [address], [place]    Date: 2024     Patient Name: Marcela Henao  MRN: [id number]  : 1935  Room: [address] [place] [address]    Evaluating OT: Tammy Marx, OTR/L - [id number]    Referring Provider: Merary Lawson APRN - CNP   Specific Provider Orders/Date: \"OT eval and treat\" - 2024    Diagnosis: Weakness [R53.1]  General weakness [R53.1]  Failure to thrive in adult [R62.7]  Moderate dementia, unspecified dementia type, unspecified whether behavioral, psychotic, or mood disturbance or anxiety (HCC) [F03.B0]      Pertinent Medical History: DM, HLD, HTN, CVA     Precautions: fall risk - reports L knee samuel     Assessment of Current Deficits:    [x] Functional mobility   [x]ADLs  [x] Strength               []Cognition   [x] Functional transfers   [x] IADLs         [x] Safety Awareness   [x]Endurance   [] Fine Coordination              [x] Balance      [] Vision/perception   []Sensation    []Gross Motor Coordination  [] ROM  [] Delirium                   [] Motor Control     OT PLAN OF CARE   OT POC is based on physician orders, patient diagnosis, and results of clinical assessment.  Frequency/Duration 2-5 days/week for 2 weeks PRN   Specific OT Treatment Interventions to Include:   * Instruction/training on adapted ADL techniques and AE recommendations to increase functional independence within precautions       * Training on energy conservation strategies, correct breathing pattern and techniques to improve independence/tolerance for self-care routine  * Functional transfer/mobility training/DME recommendations for increased independence, safety, and fall prevention  * Patient/Family education to increase follow through with safety techniques and functional independence  * Recommendation of environmental modifications for increased safety with functional  2

## 2024-05-08 LAB
ANION GAP SERPL CALCULATED.3IONS-SCNC: 10 MMOL/L (ref 7–16)
BASOPHILS # BLD: 0.05 K/UL (ref 0–0.2)
BASOPHILS NFR BLD: 1 % (ref 0–2)
BUN SERPL-MCNC: 31 MG/DL (ref 6–23)
CALCIUM SERPL-MCNC: 9.6 MG/DL (ref 8.6–10.2)
CHLORIDE SERPL-SCNC: 107 MMOL/L (ref 98–107)
CO2 SERPL-SCNC: 20 MMOL/L (ref 22–29)
CREAT SERPL-MCNC: 1.6 MG/DL (ref 0.5–1)
EOSINOPHIL # BLD: 0.6 K/UL (ref 0.05–0.5)
EOSINOPHILS RELATIVE PERCENT: 8 % (ref 0–6)
ERYTHROCYTE [DISTWIDTH] IN BLOOD BY AUTOMATED COUNT: 13.3 % (ref 11.5–15)
GFR, ESTIMATED: 31 ML/MIN/1.73M2
GLUCOSE SERPL-MCNC: 96 MG/DL (ref 74–99)
HCT VFR BLD AUTO: 38.6 % (ref 34–48)
HGB BLD-MCNC: 12.2 G/DL (ref 11.5–15.5)
IMM GRANULOCYTES # BLD AUTO: 0.05 K/UL (ref 0–0.58)
IMM GRANULOCYTES NFR BLD: 1 % (ref 0–5)
LYMPHOCYTES NFR BLD: 1.19 K/UL (ref 1.5–4)
LYMPHOCYTES RELATIVE PERCENT: 15 % (ref 20–42)
MCH RBC QN AUTO: 29.6 PG (ref 26–35)
MCHC RBC AUTO-ENTMCNC: 31.6 G/DL (ref 32–34.5)
MCV RBC AUTO: 93.7 FL (ref 80–99.9)
MONOCYTES NFR BLD: 0.75 K/UL (ref 0.1–0.95)
MONOCYTES NFR BLD: 9 % (ref 2–12)
NEUTROPHILS NFR BLD: 67 % (ref 43–80)
NEUTS SEG NFR BLD: 5.38 K/UL (ref 1.8–7.3)
PLATELET CONFIRMATION: NORMAL
PLATELET, FLUORESCENCE: 221 K/UL (ref 130–450)
PMV BLD AUTO: 10 FL (ref 7–12)
POTASSIUM SERPL-SCNC: 4.5 MMOL/L (ref 3.5–5)
RBC # BLD AUTO: 4.12 M/UL (ref 3.5–5.5)
SODIUM SERPL-SCNC: 137 MMOL/L (ref 132–146)
WBC OTHER # BLD: 8 K/UL (ref 4.5–11.5)

## 2024-05-08 PROCEDURE — 51798 US URINE CAPACITY MEASURE: CPT

## 2024-05-08 PROCEDURE — 2060000000 HC ICU INTERMEDIATE R&B

## 2024-05-08 PROCEDURE — 2580000003 HC RX 258

## 2024-05-08 PROCEDURE — 6360000002 HC RX W HCPCS

## 2024-05-08 PROCEDURE — 6370000000 HC RX 637 (ALT 250 FOR IP): Performed by: NURSE PRACTITIONER

## 2024-05-08 PROCEDURE — 80048 BASIC METABOLIC PNL TOTAL CA: CPT

## 2024-05-08 PROCEDURE — 51701 INSERT BLADDER CATHETER: CPT

## 2024-05-08 PROCEDURE — 97535 SELF CARE MNGMENT TRAINING: CPT

## 2024-05-08 PROCEDURE — 6370000000 HC RX 637 (ALT 250 FOR IP)

## 2024-05-08 PROCEDURE — 36415 COLL VENOUS BLD VENIPUNCTURE: CPT

## 2024-05-08 PROCEDURE — 97161 PT EVAL LOW COMPLEX 20 MIN: CPT

## 2024-05-08 PROCEDURE — 97530 THERAPEUTIC ACTIVITIES: CPT

## 2024-05-08 PROCEDURE — 85025 COMPLETE CBC W/AUTO DIFF WBC: CPT

## 2024-05-08 RX ORDER — HYDRALAZINE HYDROCHLORIDE 20 MG/ML
5 INJECTION INTRAMUSCULAR; INTRAVENOUS EVERY 6 HOURS PRN
Status: DISCONTINUED | OUTPATIENT
Start: 2024-05-08 | End: 2024-05-10 | Stop reason: HOSPADM

## 2024-05-08 RX ADMIN — ATORVASTATIN CALCIUM 40 MG: 40 TABLET, FILM COATED ORAL at 21:43

## 2024-05-08 RX ADMIN — AMLODIPINE BESYLATE 5 MG: 5 TABLET ORAL at 08:47

## 2024-05-08 RX ADMIN — Medication 3 MG: at 21:43

## 2024-05-08 RX ADMIN — ENOXAPARIN SODIUM 40 MG: 100 INJECTION SUBCUTANEOUS at 21:43

## 2024-05-08 RX ADMIN — SODIUM CHLORIDE, PRESERVATIVE FREE 10 ML: 5 INJECTION INTRAVENOUS at 08:48

## 2024-05-08 RX ADMIN — DONEPEZIL HYDROCHLORIDE 5 MG: 5 TABLET, FILM COATED ORAL at 08:47

## 2024-05-08 RX ADMIN — FOLIC ACID 1 MG: 1 TABLET ORAL at 08:47

## 2024-05-08 RX ADMIN — TROSPIUM CHLORIDE 20 MG: 20 TABLET, FILM COATED ORAL at 06:10

## 2024-05-08 RX ADMIN — SODIUM CHLORIDE: 9 INJECTION, SOLUTION INTRAVENOUS at 06:12

## 2024-05-08 RX ADMIN — METOPROLOL SUCCINATE 25 MG: 25 TABLET, EXTENDED RELEASE ORAL at 21:43

## 2024-05-08 RX ADMIN — POTASSIUM CHLORIDE 20 MEQ: 1500 TABLET, EXTENDED RELEASE ORAL at 08:47

## 2024-05-08 RX ADMIN — ASPIRIN 81 MG: 81 TABLET, COATED ORAL at 08:48

## 2024-05-08 RX ADMIN — CLOPIDOGREL BISULFATE 75 MG: 75 TABLET ORAL at 08:47

## 2024-05-08 RX ADMIN — HYDROCHLOROTHIAZIDE 25 MG: 25 TABLET ORAL at 08:48

## 2024-05-08 RX ADMIN — Medication 2000 UNITS: at 08:48

## 2024-05-08 RX ADMIN — FLUOXETINE HYDROCHLORIDE 40 MG: 20 CAPSULE ORAL at 08:47

## 2024-05-08 RX ADMIN — ACETAMINOPHEN 650 MG: 325 TABLET ORAL at 21:52

## 2024-05-08 RX ADMIN — GLIPIZIDE 5 MG: 5 TABLET ORAL at 08:47

## 2024-05-08 RX ADMIN — TROSPIUM CHLORIDE 20 MG: 20 TABLET, FILM COATED ORAL at 15:44

## 2024-05-08 RX ADMIN — ALOGLIPTIN 12.5 MG: 12.5 TABLET, FILM COATED ORAL at 08:47

## 2024-05-08 RX ADMIN — SODIUM CHLORIDE, PRESERVATIVE FREE 10 ML: 5 INJECTION INTRAVENOUS at 21:44

## 2024-05-08 ASSESSMENT — PAIN DESCRIPTION - LOCATION: LOCATION: KNEE

## 2024-05-08 ASSESSMENT — PAIN SCALES - GENERAL
PAINLEVEL_OUTOF10: 0
PAINLEVEL_OUTOF10: 0
PAINLEVEL_OUTOF10: 3

## 2024-05-08 ASSESSMENT — PAIN DESCRIPTION - ORIENTATION: ORIENTATION: LEFT

## 2024-05-08 ASSESSMENT — PAIN DESCRIPTION - DESCRIPTORS: DESCRIPTORS: ACHING

## 2024-05-08 ASSESSMENT — PAIN - FUNCTIONAL ASSESSMENT: PAIN_FUNCTIONAL_ASSESSMENT: ACTIVITIES ARE NOT PREVENTED

## 2024-05-08 NOTE — PROGRESS NOTES
Physical Therapy  Facility/Department: [address] INTERNAL MEDICINE 2  Physical Therapy Initial Assessment    Name: Marcela Henao  : 1935  MRN: [id number]  Date of Service: 2024    Patient Diagnosis(es): The primary encounter diagnosis was General weakness. Diagnoses of Failure to thrive in adult and Moderate dementia, unspecified dementia type, unspecified whether behavioral, psychotic, or mood disturbance or anxiety (HCC) were also pertinent to this visit.  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  Past Surgical History:  has a past surgical history that includes  section; eye surgery; Colonoscopy; Cholecystectomy; and Hysterectomy.          Referring provider:  Brenda Muse MD    PT Order:  PT eval and treat     Evaluating PT:  Leonarda Verde PT, DPT PT [id number]    Room #:  [facility]-  Diagnosis:  Weakness [R53.1]  General weakness [R53.1]  Failure to thrive in adult [R62.7]  Moderate dementia, unspecified dementia type, unspecified whether behavioral, psychotic, or mood disturbance or anxiety (HCC) [F03.B0]  Precautions:  fall risk, left knee instability.  Equipment Needs:  none.  Pt reported owing a walker.     SUBJECTIVE:    Pt lives with her  in a 2 story home with 2 stairs to enter and 1 rail.  Bed and bath is on first floor.  Pt ambulated with walker PTA.    OBJECTIVE:   Initial Evaluation  Date:  Treatment Short Term/ Long Term   Goals   Was pt agreeable to Eval/treatment? yes     Does pt have pain? None reported     Bed Mobility  Rolling: NT  Supine to sit: NT  Sit to supine: NT  Scooting: NT  Pt found sitting on EOB.  SBA   Transfers Sit to stand: Max A x 2  Stand to sit: Max A x 2  Stand pivot: Max a x 2 without device  Mod A   Ambulation    Pt unable due to LE weakness  5+ feet

## 2024-05-08 NOTE — CARE COORDINATION
Social Work:    Lainey at [facility] accepted Mrs. Henao's referral pending insurance approval.  She advises that patient is in co-pay days. Lainey was asked to start the authorization today.  Social work updated Mr. Henao.   (N-[id number], [facility] [place] hens completed)    Electronically signed by JERMAINE Bragg on 5/8/2024 at 10:42 AM

## 2024-05-08 NOTE — PROGRESS NOTES
[facility]                                Progress note    Subjective:    The patient is awake and alert.    Resting comfortably in bed  No complaints on assessment  States she just feels tired    Objective:    BP (!) 152/62   Pulse 78   Temp 98.4 °F (36.9 °C) (Oral)   Resp 15   Ht 1.676 m (5' 6\")   Wt 77.1 kg (170 lb)   SpO2 97%   BMI 27.44 kg/m²     In: 2153.6 [P.O.:300; I.V.:1806]  Out: 900   In: 2153.6   Out: 900 [Urine:900]    General appearance: NAD, conversant, intermittent confusion  HEENT: AT/NC, MMM  Neck: FROM, supple  Lungs: Clear to auscultation  CV: RRR, no MRGs  Vasc: Radial pulses 2+  Abdomen: Soft, non-tender; no masses or HSM  Extremities: No peripheral edema or digital cyanosis  Skin: no rash, lesions or ulcers  Psych: Alert and oriented to person  Neuro: Alert and interactive     Recent Labs     05/06/24  1446 05/07/24  0310 05/08/24  0825   WBC 10.9 8.6 8.0   HGB 14.6 13.2 12.2   HCT 45.0 40.6 38.6    226  --        Recent Labs     05/06/24  1446 05/07/24  0310 05/08/24  0825    140 137   K 3.8 3.3* 4.5    103 107   CO2 28 28 20*   BUN 33* 27* 31*   CREATININE 1.4* 1.2* 1.6*   CALCIUM 10.4* 9.7 9.6       Assessment:    Principal Problem:    Weakness  Resolved Problems:    * No resolved hospital problems. *      Plan:  Patient is an 88-year-old female admitted to [facility] for  Dehydration   -Monitor labs  -33/1.4 > 27/1.2  -Continue IVF NS at 75  -PT OT  -Social work to follow for possible placement needs     Hypertension  -Continue home medications with parameters  -Continue to monitor Bps     Dementia  -Continue home Aricept     diabetes mellitus  -Continue home glipizide and Nesina  -Monitor blood glucose levels    5/8/24:  -Mild bump in kidney function, 31/1.6  -Hold nephrotoxic meds  -Continue IVF NS at 75  -Monitor I's and O's  -If kidney function continues to trend upwards, will consult nephrology  -Awaiting acceptance to facility for

## 2024-05-08 NOTE — ACP (ADVANCE CARE PLANNING)
Advance Care Planning   Healthcare Decision Maker:    Primary Decision Maker: Talon Heano Jr. - spouse - [phone]    Click here to complete Healthcare Decision Makers including selection of the Healthcare Decision Maker Relationship (ie \"Primary\").

## 2024-05-08 NOTE — PROGRESS NOTES
Occupational Therapy  OT BEDSIDE TREATMENT NOTE      Date:2024  Patient Name: Marcela Henao  MRN: [id number]  : 1935  Room: [address] [place], [place] [address]        Evaluating OT: Tammy Marx OTR/L - [id number]     Referring Provider: Merary Lawson APRN - CNP   Specific Provider Orders/Date: \"OT eval and treat\" - 2024     Diagnosis: Weakness [R53.1]  General weakness [R53.1]  Failure to thrive in adult [R62.7]  Moderate dementia, unspecified dementia type, unspecified whether behavioral, psychotic, or mood disturbance or anxiety (HCC) [F03.B0]       Pertinent Medical History: DM, HLD, HTN, CVA      Precautions: fall risk - reports L knee samuel      Assessment of Current Deficits:    [x] Functional mobility             [x]ADLs           [x] Strength                  []Cognition   [x] Functional transfers           [x] IADLs         [x] Safety Awareness   [x]Endurance   [] Fine Coordination              [x] Balance      [] Vision/perception   []Sensation     []Gross Motor Coordination  [] ROM           [] Delirium                   [] Motor Control      OT PLAN OF CARE   OT POC is based on physician orders, patient diagnosis, and results of clinical assessment.  Frequency/Duration 2-5 days/week for 2 weeks PRN   Specific OT Treatment Interventions to Include:   * Instruction/training on adapted ADL techniques and AE recommendations to increase functional independence within precautions       * Training on energy conservation strategies, correct breathing pattern and techniques to improve independence/tolerance for self-care routine  * Functional transfer/mobility training/DME recommendations for increased independence, safety, and fall prevention  * Patient/Family education to increase follow through with safety techniques and functional independence  * Recommendation of environmental modifications for increased safety with functional transfers/mobility and ADLs  * Therapeutic exercise to improve motor endurance, ROM,  and functional strength for ADLs/functional transfers  * Therapeutic activities to facilitate/challenge dynamic balance, stand tolerance for increased safety and independence with ADLs  * Therapeutic activities to facilitate gross/fine motor skills for increased independence with ADLs  * Neuro-muscular re-education: facilitation of righting/equilibrium reactions, midline orientation, scapular stability/mobility, normalization of muscle tone, and facilitation of volitional active controled movement     Recommended Adaptive Equipment: TBD      Home Living: Patient lives with her  in a 2 floor home with 2 steps and 1 rail to enter, Patient's bedroom and bathroom are on the main floor.  Bathroom Setup: patient sponge bathes on the first floor  Equipment Owned: FWW, w/c     Prior Level of Function (PLOF): Patient reports she required some assistance with ADLs, assistance with IADLs, and was able to participate with functional mobility (FWW and assistance) prior to this hospitalization. Patient reports she was recently in the hospital and d/c to rehab, reports she was at rehab \"for a really long time\" and then d/c home with her  about 1 week ago. Patient reports several falls since being at home.     Pain Level: Pt did not report pain.    Cognition: Pt awake and alert. Limited verbalizations.      Functional Assessment:                  AM-PAC Daily Activity Raw Score: 15/24    Initial Eval Status  Date: 5/7/2024 Treatment Status  Date: 5/8/24  Short Term Goals = Long Term Goals   Feeding setup   independent   Grooming MIN A, combing hair while in sitting Min A   independent   UB Dressing MIN A for gown management  mod A to change gown.    Mod I    LB Dressing MAX A to don socks  max A to don and arrange brief.  MIN A - with use of AE, as needed/appropriate   Bathing MOD A   MIN A - with use of AE/DME, as needed/appropriate   Toileting MAX A  Pure wick in place Max A mark hygiene and clothing management.

## 2024-05-08 NOTE — PLAN OF CARE
Problem: ABCDS Injury Assessment  Goal: Absence of physical injury  Outcome: Progressing     Problem: Skin/Tissue Integrity  Goal: Absence of new skin breakdown  Description: 1.  Monitor for areas of redness and/or skin breakdown  2.  Assess vascular access sites hourly  3.  Every 4-6 hours minimum:  Change oxygen saturation probe site  4.  Every 4-6 hours:  If on nasal continuous positive airway pressure, respiratory therapy assess nares and determine need for appliance change or resting period.  Outcome: Progressing     Problem: Discharge Planning  Goal: Discharge to home or other facility with appropriate resources  Outcome: Progressing     Problem: Safety - Adult  Goal: Free from fall injury  Outcome: Progressing     Problem: Chronic Conditions and Co-morbidities  Goal: Patient's chronic conditions and co-morbidity symptoms are monitored and maintained or improved  Outcome: Progressing     Problem: Pain  Goal: Verbalizes/displays adequate comfort level or baseline comfort level  Outcome: Progressing

## 2024-05-09 ENCOUNTER — APPOINTMENT (OUTPATIENT)
Dept: ULTRASOUND IMAGING | Age: 89
End: 2024-05-09
Payer: MEDICARE

## 2024-05-09 ENCOUNTER — APPOINTMENT (OUTPATIENT)
Dept: GENERAL RADIOLOGY | Age: 89
End: 2024-05-09
Payer: MEDICARE

## 2024-05-09 LAB
ANION GAP SERPL CALCULATED.3IONS-SCNC: 7 MMOL/L (ref 7–16)
BASOPHILS # BLD: 0.04 K/UL (ref 0–0.2)
BASOPHILS NFR BLD: 1 % (ref 0–2)
BUN SERPL-MCNC: 25 MG/DL (ref 6–23)
CALCIUM SERPL-MCNC: 9.4 MG/DL (ref 8.6–10.2)
CHLORIDE SERPL-SCNC: 108 MMOL/L (ref 98–107)
CO2 SERPL-SCNC: 26 MMOL/L (ref 22–29)
CREAT SERPL-MCNC: 1.4 MG/DL (ref 0.5–1)
EOSINOPHIL # BLD: 0.59 K/UL (ref 0.05–0.5)
EOSINOPHILS RELATIVE PERCENT: 11 % (ref 0–6)
ERYTHROCYTE [DISTWIDTH] IN BLOOD BY AUTOMATED COUNT: 13.4 % (ref 11.5–15)
GFR, ESTIMATED: 37 ML/MIN/1.73M2
GLUCOSE SERPL-MCNC: 85 MG/DL (ref 74–99)
HCT VFR BLD AUTO: 37.6 % (ref 34–48)
HGB BLD-MCNC: 11.8 G/DL (ref 11.5–15.5)
IMM GRANULOCYTES # BLD AUTO: 0.04 K/UL (ref 0–0.58)
IMM GRANULOCYTES NFR BLD: 1 % (ref 0–5)
LYMPHOCYTES NFR BLD: 1.05 K/UL (ref 1.5–4)
LYMPHOCYTES RELATIVE PERCENT: 19 % (ref 20–42)
MCH RBC QN AUTO: 29.5 PG (ref 26–35)
MCHC RBC AUTO-ENTMCNC: 31.4 G/DL (ref 32–34.5)
MCV RBC AUTO: 94 FL (ref 80–99.9)
MONOCYTES NFR BLD: 0.57 K/UL (ref 0.1–0.95)
MONOCYTES NFR BLD: 10 % (ref 2–12)
NEUTROPHILS NFR BLD: 59 % (ref 43–80)
NEUTS SEG NFR BLD: 3.33 K/UL (ref 1.8–7.3)
PLATELET # BLD AUTO: 227 K/UL (ref 130–450)
PMV BLD AUTO: 10 FL (ref 7–12)
POTASSIUM SERPL-SCNC: 3.9 MMOL/L (ref 3.5–5)
RBC # BLD AUTO: 4 M/UL (ref 3.5–5.5)
SODIUM SERPL-SCNC: 141 MMOL/L (ref 132–146)
WBC OTHER # BLD: 5.6 K/UL (ref 4.5–11.5)

## 2024-05-09 PROCEDURE — 80048 BASIC METABOLIC PNL TOTAL CA: CPT

## 2024-05-09 PROCEDURE — 85025 COMPLETE CBC W/AUTO DIFF WBC: CPT

## 2024-05-09 PROCEDURE — 36415 COLL VENOUS BLD VENIPUNCTURE: CPT

## 2024-05-09 PROCEDURE — 6360000002 HC RX W HCPCS: Performed by: NURSE PRACTITIONER

## 2024-05-09 PROCEDURE — 6370000000 HC RX 637 (ALT 250 FOR IP)

## 2024-05-09 PROCEDURE — 6370000000 HC RX 637 (ALT 250 FOR IP): Performed by: NURSE PRACTITIONER

## 2024-05-09 PROCEDURE — 76770 US EXAM ABDO BACK WALL COMP: CPT

## 2024-05-09 PROCEDURE — 2580000003 HC RX 258

## 2024-05-09 PROCEDURE — 6360000002 HC RX W HCPCS

## 2024-05-09 PROCEDURE — 74018 RADEX ABDOMEN 1 VIEW: CPT

## 2024-05-09 PROCEDURE — 2060000000 HC ICU INTERMEDIATE R&B

## 2024-05-09 RX ORDER — DIPHENHYDRAMINE HCL 25 MG
25 TABLET ORAL EVERY 6 HOURS PRN
Status: DISCONTINUED | OUTPATIENT
Start: 2024-05-09 | End: 2024-05-10 | Stop reason: HOSPADM

## 2024-05-09 RX ADMIN — FLUOXETINE HYDROCHLORIDE 40 MG: 20 CAPSULE ORAL at 08:23

## 2024-05-09 RX ADMIN — ENOXAPARIN SODIUM 40 MG: 100 INJECTION SUBCUTANEOUS at 20:31

## 2024-05-09 RX ADMIN — HYDRALAZINE HYDROCHLORIDE 5 MG: 20 INJECTION INTRAMUSCULAR; INTRAVENOUS at 18:17

## 2024-05-09 RX ADMIN — DONEPEZIL HYDROCHLORIDE 5 MG: 5 TABLET, FILM COATED ORAL at 08:23

## 2024-05-09 RX ADMIN — Medication 3 MG: at 20:32

## 2024-05-09 RX ADMIN — POTASSIUM CHLORIDE 20 MEQ: 1500 TABLET, EXTENDED RELEASE ORAL at 08:23

## 2024-05-09 RX ADMIN — CLOPIDOGREL BISULFATE 75 MG: 75 TABLET ORAL at 08:23

## 2024-05-09 RX ADMIN — DIPHENHYDRAMINE HCL 25 MG: 25 TABLET ORAL at 20:32

## 2024-05-09 RX ADMIN — Medication 2000 UNITS: at 08:23

## 2024-05-09 RX ADMIN — METOPROLOL SUCCINATE 25 MG: 25 TABLET, EXTENDED RELEASE ORAL at 20:32

## 2024-05-09 RX ADMIN — TROSPIUM CHLORIDE 20 MG: 20 TABLET, FILM COATED ORAL at 16:25

## 2024-05-09 RX ADMIN — TROSPIUM CHLORIDE 20 MG: 20 TABLET, FILM COATED ORAL at 05:03

## 2024-05-09 RX ADMIN — DIPHENHYDRAMINE HCL 25 MG: 25 TABLET ORAL at 05:03

## 2024-05-09 RX ADMIN — AMLODIPINE BESYLATE 5 MG: 5 TABLET ORAL at 08:23

## 2024-05-09 RX ADMIN — GLIPIZIDE 5 MG: 5 TABLET ORAL at 08:23

## 2024-05-09 RX ADMIN — FOLIC ACID 1 MG: 1 TABLET ORAL at 08:23

## 2024-05-09 RX ADMIN — ASPIRIN 81 MG: 81 TABLET, COATED ORAL at 08:23

## 2024-05-09 RX ADMIN — ATORVASTATIN CALCIUM 40 MG: 40 TABLET, FILM COATED ORAL at 20:32

## 2024-05-09 RX ADMIN — SODIUM CHLORIDE, PRESERVATIVE FREE 10 ML: 5 INJECTION INTRAVENOUS at 20:32

## 2024-05-09 RX ADMIN — SODIUM CHLORIDE, PRESERVATIVE FREE 10 ML: 5 INJECTION INTRAVENOUS at 08:23

## 2024-05-09 ASSESSMENT — PAIN DESCRIPTION - LOCATION: LOCATION: KNEE

## 2024-05-09 ASSESSMENT — PAIN DESCRIPTION - ORIENTATION: ORIENTATION: LEFT

## 2024-05-09 ASSESSMENT — PAIN SCALES - GENERAL: PAINLEVEL_OUTOF10: 3

## 2024-05-09 ASSESSMENT — PAIN DESCRIPTION - DESCRIPTORS: DESCRIPTORS: ACHING

## 2024-05-09 NOTE — CONSULTS
2024 1:20 PM  Service: Urology  Group: [facility] ([facility])    Marcela Henao  [id number]     Chief Complaint:    Failure to thrive and urinary retention.    History of Present Illness:      The patient is a 88 y.o. female who was a historian I am not sure of her reliability she says she fell and subsequently became unable to ambulate and unable to void she says she has never had urinary tract problems before no gross hematuria no stones no urologic consultation.  She is afebrile.  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    Stroke (HCC)          Past Surgical History:   Procedure Laterality Date     SECTION      CHOLECYSTECTOMY      COLONOSCOPY      EYE SURGERY      bilateral w/ implants    HYSTERECTOMY (CERVIX STATUS UNKNOWN)         Medications Prior to Admission:    Medications Prior to Admission: atorvastatin (LIPITOR) 40 MG tablet, Take 1 tablet by mouth nightly  losartan (COZAAR) 50 MG tablet, Take 1 tablet by mouth nightly  amLODIPine (NORVASC) 5 MG tablet, Take 1 tablet by mouth daily  white petrolatum OINT ointment, Apply topically in the morning and at bedtime  clopidogrel (PLAVIX) 75 MG tablet, Take 1 tablet by mouth every morning  donepezil (ARICEPT) 5 MG tablet, Take 1 tablet by mouth every morning  hydroCHLOROthiazide 12.5 MG tablet, Take 2 tablets by mouth every morning  metoprolol succinate (TOPROL XL) 25 MG extended release tablet, Take 1 tablet by mouth nightly  potassium chloride (KLOR-CON M) 20 MEQ extended release tablet, Take 1 tablet by mouth every morning  trospium (SANCTURA) 20 MG tablet, Take 1 tablet by mouth 2 times daily (before meals)  glipiZIDE  (GLUCOTROL) 5 MG tablet, Take 1 tablet by mouth every morning  SITagliptin (JANUVIA) 100 MG tablet, Take 1 tablet by mouth every morning  folic acid (FOLVITE) 1 MG tablet, Take 1 tablet by mouth every morning  Cholecalciferol (VITAMIN D3) 50 MCG (2000 UT) CAPS, Take 1 capsule by mouth every morning  FLUoxetine (PROZAC) 40 MG capsule, Take 1 capsule by mouth every morning  aspirin 81 MG EC tablet, Take 1 tablet by mouth every morning    Allergies:    Latex    Social History:    reports that she quit smoking about 38 years ago. She started smoking about 68 years ago. She has a 60.0 pack-year smoking history. She has never used smokeless tobacco. She reports current alcohol use. She reports that she does not use drugs.  She says she lives at home with her .  Family History:   Non-contributory to this Urological problem  family history is not on file.    Review of Systems:  Respiratory: negative for cough and hemoptysis  Cardiovascular: negative for chest pain and dyspnea  Gastrointestinal: negative for abdominal pain, diarrhea, nausea and vomiting  Derm: negative for rash and skin lesion(s)  Neurological: negative for seizures and tremors  Endocrine: negative for diabetic symptoms including polydipsia and polyuria  : As above in the HPI, otherwise negative  All other reviews are negative    Physical Exam:     Vitals:  BP (!) 153/68   Pulse 66   Temp 97.7 °F (36.5 °C) (Oral)   Resp 18   Ht 1.676 m (5' 6\")   Wt 86.2 kg (190 lb)   SpO2 100%   BMI 30.67 kg/m²     General:  Awake, alert, oriented X 3.  Well developed, well nourished, well groomed.  No apparent distress.  HEENT:  Normocephalic, atraumatic.  Pupils equal, round.  No scleral icterus.  No conjunctival injection.  Normal lips, teeth, and gums.  No nasal discharge.  Neck:  Supple, no masses.  Heart:  RRR  Lungs:  No audible wheezing.  Respirations symmetric and non-labored.  Abdomen:  soft, nontender, no masses, no organomegaly, no peritoneal

## 2024-05-09 NOTE — PROGRESS NOTES
[facility]                                Progress note    Subjective:    The patient is awake and alert.    No acute complaints    Objective:    BP (!) 178/71   Pulse 72   Temp 97.4 °F (36.3 °C) (Oral)   Resp 18   Ht 1.676 m (5' 6\")   Wt 86.2 kg (190 lb)   SpO2 99%   BMI 30.67 kg/m²     In: 480 [P.O.:480]  Out: 1750   In: 480   Out: 1750 [Urine:1750]    General appearance: NAD, conversant, intermittent confusion  HEENT: AT/NC, MMM  Neck: FROM, supple  Lungs: Clear to auscultation  CV: RRR, no MRGs  Vasc: Radial pulses 2+  Abdomen: Soft, non-tender; no masses or HSM  Extremities: No peripheral edema or digital cyanosis  Skin: no rash, lesions or ulcers  Psych: Alert and oriented to person  Neuro: Alert and interactive     Recent Labs     05/07/24  0310 05/08/24  0825 05/09/24  0733   WBC 8.6 8.0 5.6   HGB 13.2 12.2 11.8   HCT 40.6 38.6 37.6     --  227       Recent Labs     05/07/24  0310 05/08/24  0825 05/09/24  0733    137 141   K 3.3* 4.5 3.9    107 108*   CO2 28 20* 26   BUN 27* 31* 25*   CREATININE 1.2* 1.6* 1.4*   CALCIUM 9.7 9.6 9.4       Assessment:    Principal Problem:    Weakness  Resolved Problems:    * No resolved hospital problems. *      Plan:  Patient is an 88-year-old female admitted to [facility] for  Dehydration   -Monitor labs  -33/1.4 > 27/1.2  -Continue IVF NS at 76  -PT OT  -Social work to follow for possible placement needs     Hypertension  -Continue home medications with parameters  -Continue to monitor Bps     Dementia  -Continue home Aricept     diabetes mellitus  -Continue home glipizide and Nesina  -Monitor blood glucose levels    5/8/24:  -Mild bump in kidney function, 31/1.6  -Hold nephrotoxic meds  -Continue IVF NS at 76  -Monitor I's and O's  -If kidney function continues to trend upwards, will consult nephrology  -Awaiting acceptance to facility for further rehab    5/9/2024  Retroperitoneal ultrasound without hydronephrosis,   Chest

## 2024-05-09 NOTE — CARE COORDINATION
Social Work:    Lainey at [facility] received authorization.  Chart reviewed. Urology was consulted today due to urinary retention. Possible consult to nephrology if kidney function continues to trend up, per ELYSIA Hairston.    Electronically signed by JERMAINE Bragg on 5/9/2024 at 10:13 AM

## 2024-05-09 NOTE — PLAN OF CARE
Problem: ABCDS Injury Assessment  Goal: Absence of physical injury  5/8/2024 2230 by LORETA MCKEON  Outcome: Progressing  Flowsheets (Taken 5/8/2024 2230)  Absence of Physical Injury: Implement safety measures based on patient assessment  5/8/2024 0944 by Waleska Vela, RN  Outcome: Progressing     Problem: Skin/Tissue Integrity  Goal: Absence of new skin breakdown  Description: 1.  Monitor for areas of redness and/or skin breakdown  2.  Assess vascular access sites hourly  3.  Every 4-6 hours minimum:  Change oxygen saturation probe site  4.  Every 4-6 hours:  If on nasal continuous positive airway pressure, respiratory therapy assess nares and determine need for appliance change or resting period.  5/8/2024 2230 by LORETA MCKEON  Outcome: Progressing  5/8/2024 0944 by Waleska Vela, RN  Outcome: Progressing     Problem: Discharge Planning  Goal: Discharge to home or other facility with appropriate resources  5/8/2024 2230 by LORETA MCKEON  Outcome: Progressing  Flowsheets (Taken 5/6/2024 1650 by Diamond Aguilar, RN)  Discharge to home or other facility with appropriate resources: Refer to discharge planning if patient needs post-hospital services based on physician order or complex needs related to functional status, cognitive ability or social support system  5/8/2024 0944 by Waleska Vela, RN  Outcome: Progressing

## 2024-05-09 NOTE — PLAN OF CARE
Problem: Skin/Tissue Integrity  Goal: Absence of new skin breakdown  Description: 1.  Monitor for areas of redness and/or skin breakdown  2.  Assess vascular access sites hourly  3.  Every 4-6 hours minimum:  Change oxygen saturation probe site  4.  Every 4-6 hours:  If on nasal continuous positive airway pressure, respiratory therapy assess nares and determine need for appliance change or resting period.  5/9/2024 0847 by Elza Alvarado RN  Outcome: Progressing  5/8/2024 2230 by LORETA MCKEON  Outcome: Progressing     Problem: Safety - Adult  Goal: Free from fall injury  Outcome: Progressing

## 2024-05-10 VITALS
RESPIRATION RATE: 16 BRPM | TEMPERATURE: 98.5 F | OXYGEN SATURATION: 97 % | SYSTOLIC BLOOD PRESSURE: 146 MMHG | DIASTOLIC BLOOD PRESSURE: 80 MMHG | BODY MASS INDEX: 31.84 KG/M2 | WEIGHT: 198.1 LBS | HEART RATE: 80 BPM | HEIGHT: 66 IN

## 2024-05-10 LAB
ANION GAP SERPL CALCULATED.3IONS-SCNC: 8 MMOL/L (ref 7–16)
BASOPHILS # BLD: 0.05 K/UL (ref 0–0.2)
BASOPHILS NFR BLD: 1 % (ref 0–2)
BUN SERPL-MCNC: 24 MG/DL (ref 6–23)
CALCIUM SERPL-MCNC: 9.6 MG/DL (ref 8.6–10.2)
CHLORIDE SERPL-SCNC: 106 MMOL/L (ref 98–107)
CO2 SERPL-SCNC: 24 MMOL/L (ref 22–29)
CREAT SERPL-MCNC: 1.3 MG/DL (ref 0.5–1)
EOSINOPHIL # BLD: 0.72 K/UL (ref 0.05–0.5)
EOSINOPHILS RELATIVE PERCENT: 9 % (ref 0–6)
ERYTHROCYTE [DISTWIDTH] IN BLOOD BY AUTOMATED COUNT: 13.2 % (ref 11.5–15)
GFR, ESTIMATED: 39 ML/MIN/1.73M2
GLUCOSE SERPL-MCNC: 84 MG/DL (ref 74–99)
HCT VFR BLD AUTO: 39 % (ref 34–48)
HGB BLD-MCNC: 12.3 G/DL (ref 11.5–15.5)
IMM GRANULOCYTES # BLD AUTO: 0.04 K/UL (ref 0–0.58)
IMM GRANULOCYTES NFR BLD: 1 % (ref 0–5)
LYMPHOCYTES NFR BLD: 1.26 K/UL (ref 1.5–4)
LYMPHOCYTES RELATIVE PERCENT: 16 % (ref 20–42)
MCH RBC QN AUTO: 29.8 PG (ref 26–35)
MCHC RBC AUTO-ENTMCNC: 31.5 G/DL (ref 32–34.5)
MCV RBC AUTO: 94.4 FL (ref 80–99.9)
MONOCYTES NFR BLD: 0.73 K/UL (ref 0.1–0.95)
MONOCYTES NFR BLD: 9 % (ref 2–12)
NEUTROPHILS NFR BLD: 64 % (ref 43–80)
NEUTS SEG NFR BLD: 5.01 K/UL (ref 1.8–7.3)
PLATELET # BLD AUTO: 235 K/UL (ref 130–450)
PMV BLD AUTO: 9.7 FL (ref 7–12)
POTASSIUM SERPL-SCNC: 3.9 MMOL/L (ref 3.5–5)
RBC # BLD AUTO: 4.13 M/UL (ref 3.5–5.5)
SODIUM SERPL-SCNC: 138 MMOL/L (ref 132–146)
WBC OTHER # BLD: 7.8 K/UL (ref 4.5–11.5)

## 2024-05-10 PROCEDURE — 6370000000 HC RX 637 (ALT 250 FOR IP): Performed by: NURSE PRACTITIONER

## 2024-05-10 PROCEDURE — 6370000000 HC RX 637 (ALT 250 FOR IP)

## 2024-05-10 PROCEDURE — 2580000003 HC RX 258

## 2024-05-10 PROCEDURE — 97535 SELF CARE MNGMENT TRAINING: CPT

## 2024-05-10 PROCEDURE — 97530 THERAPEUTIC ACTIVITIES: CPT

## 2024-05-10 PROCEDURE — 85025 COMPLETE CBC W/AUTO DIFF WBC: CPT

## 2024-05-10 PROCEDURE — 6360000002 HC RX W HCPCS: Performed by: NURSE PRACTITIONER

## 2024-05-10 PROCEDURE — 36415 COLL VENOUS BLD VENIPUNCTURE: CPT

## 2024-05-10 PROCEDURE — 80048 BASIC METABOLIC PNL TOTAL CA: CPT

## 2024-05-10 RX ORDER — LOSARTAN POTASSIUM 50 MG/1
50 TABLET ORAL NIGHTLY
Status: DISCONTINUED | OUTPATIENT
Start: 2024-05-10 | End: 2024-05-10

## 2024-05-10 RX ORDER — LOSARTAN POTASSIUM 50 MG/1
50 TABLET ORAL NIGHTLY
Status: DISCONTINUED | OUTPATIENT
Start: 2024-05-10 | End: 2024-05-10 | Stop reason: HOSPADM

## 2024-05-10 RX ORDER — HYDROCHLOROTHIAZIDE 25 MG/1
25 TABLET ORAL EVERY MORNING
Status: DISCONTINUED | OUTPATIENT
Start: 2024-05-10 | End: 2024-05-10 | Stop reason: HOSPADM

## 2024-05-10 RX ADMIN — SODIUM CHLORIDE, PRESERVATIVE FREE 10 ML: 5 INJECTION INTRAVENOUS at 09:05

## 2024-05-10 RX ADMIN — Medication 2000 UNITS: at 09:04

## 2024-05-10 RX ADMIN — POTASSIUM CHLORIDE 20 MEQ: 1500 TABLET, EXTENDED RELEASE ORAL at 09:05

## 2024-05-10 RX ADMIN — DONEPEZIL HYDROCHLORIDE 5 MG: 5 TABLET, FILM COATED ORAL at 09:04

## 2024-05-10 RX ADMIN — AMLODIPINE BESYLATE 5 MG: 5 TABLET ORAL at 09:04

## 2024-05-10 RX ADMIN — FOLIC ACID 1 MG: 1 TABLET ORAL at 09:04

## 2024-05-10 RX ADMIN — FLUOXETINE HYDROCHLORIDE 40 MG: 20 CAPSULE ORAL at 09:04

## 2024-05-10 RX ADMIN — HYDRALAZINE HYDROCHLORIDE 5 MG: 20 INJECTION INTRAMUSCULAR; INTRAVENOUS at 04:08

## 2024-05-10 RX ADMIN — CLOPIDOGREL BISULFATE 75 MG: 75 TABLET ORAL at 09:04

## 2024-05-10 RX ADMIN — DIPHENHYDRAMINE HCL 25 MG: 25 TABLET ORAL at 03:40

## 2024-05-10 RX ADMIN — ASPIRIN 81 MG: 81 TABLET, COATED ORAL at 09:04

## 2024-05-10 RX ADMIN — GLIPIZIDE 5 MG: 5 TABLET ORAL at 09:04

## 2024-05-10 RX ADMIN — TROSPIUM CHLORIDE 20 MG: 20 TABLET, FILM COATED ORAL at 06:27

## 2024-05-10 RX ADMIN — HYDROCHLOROTHIAZIDE 25 MG: 25 TABLET ORAL at 10:22

## 2024-05-10 NOTE — DISCHARGE INSTR - COC
Continuity of Care Form    Patient Name: Marcela Henao   :  1935  MRN:  [id number]    Admit date:  2024  Discharge date:  5/10/24    Code Status Order: Full Code   Advance Directives:     Admitting Physician:  Brenda Muse MD  PCP: Jatinder Marquez MD    Discharging Nurse: Michelle Hernandez RN  Discharging Hospital Unit/Room#: 0415/0415-A  Discharging Unit Phone Number: [phone]  Emergency Contact:   Extended Emergency Contact Information  Primary Emergency Contact: Talon Henao Jr.  Address: [facility] [employer]  Home Phone: [phone]  Mobile Phone: [phone]  Relation: Spouse  Preferred language: English   needed? No  Secondary Emergency Contact: Heriberto Henao  Address: [facility] [employer]  Home Phone: [phone]  Mobile Phone: [phone]  Relation: Child  Preferred language: English    Past Surgical History:  Past Surgical History:   Procedure Laterality Date     SECTION      CHOLECYSTECTOMY      COLONOSCOPY      EYE SURGERY      bilateral w/ implants    HYSTERECTOMY (CERVIX STATUS UNKNOWN)         Immunization History:   Immunization History   Administered Date(s) Administered    COVID-19, PFIZER PURPLE top, DILUTE for use, (age 12 y+), 30mcg/0.3mL 2021, 02/15/2021, 10/19/2021       Active Problems:  Patient Active Problem List   Diagnosis Code    Diabetes mellitus type 2, uncontrolled [id number]    Essential hypertension, benign I10    Hyperlipidemia with target LDL less than 100 E78.5    Syncope and collapse R55    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

## 2024-05-10 NOTE — PROGRESS NOTES
Spoke with Haris LINN at [facility] and gave nurse to nurse report, papers faxed over.

## 2024-05-10 NOTE — CARE COORDINATION
Social work:    Physician ambulance was arranged to transport Mrs. Henao to [facility] today at 2:00 p.m.  Social work updated patient & wife, as well as Lainey at [facility].    Electronically signed by JERMAINE Bragg on 5/10/2024 at 10:55 AM

## 2024-05-10 NOTE — DISCHARGE SUMMARY
[facility]   Discharge summary   Patient ID:  Marcela Henao  [id number]  88 y.o.  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 female     Patient is an 88-year-old female admitted to [facility] for  Dehydration   -Monitor labs  -33/1.4 > 27/1.2  -Continue IVF NS at 75  -PT OT  -Social work to follow for possible placement needs     Hypertension  -Continue home medications with parameters  -Continue to monitor Bps     Dementia  -Continue home Aricept     diabetes mellitus  -Continue home glipizide and Nesina  -Monitor blood glucose levels     5/8/24:  -Mild bump in kidney function, 31/1.6  -Hold nephrotoxic meds  -Continue IVF NS at 75  -Monitor I's and O's  -If kidney function continues to trend upwards, will consult nephrology  -Awaiting acceptance to facility for further rehab     5/9/2024  Retroperitoneal ultrasound without hydronephrosis,   Chest x-ray   x-ray is unremarkable  BUN/creatinine improved to 25/1.4       5/10/24  -Continue Santiago catheter on discharge per urology recommendations  -Authorization for rehab to [facility] approved by insurance  -Discharge to [facility] was today    Recent Labs     05/08/24  0825 05/09/24  0733 05/10/24  0619   WBC 8.0 5.6

## 2024-05-10 NOTE — PROGRESS NOTES
5/10/2024 11:50 AM  Service: Urology  Group: [facility] (Thanh/Damaris/Pam)    Marcela Henao  [id number]    Subjective: Alert and pleasantly confused  She has no complaints  Yost catheter is draining yellow urine     Review of Systems  Unable to obtain     Scheduled Meds:   hydroCHLOROthiazide  25 mg Oral QAM    losartan  50 mg Oral Nightly    amLODIPine  5 mg Oral Daily    aspirin  81 mg Oral QAM    atorvastatin  40 mg Oral Nightly    vitamin D  2,000 Units Oral QAM    clopidogrel  75 mg Oral QAM    donepezil  5 mg Oral QAM    FLUoxetine  40 mg Oral QAM    folic acid  1 mg Oral QAM    glipiZIDE  5 mg Oral QAM    metoprolol succinate  25 mg Oral Nightly    [Held by provider] alogliptin  12.5 mg Oral Daily    potassium chloride  20 mEq Oral QAM    trospium  20 mg Oral BID AC    sodium chloride flush  5-40 mL IntraVENous 2 times per day    enoxaparin  40 mg SubCUTAneous Daily       Objective:  Vitals:    05/10/24 1109   BP: (!) 160/70   Pulse: 80   Resp: 16   Temp: 98.5 °F (36.9 °C)   SpO2: 97%         Allergies: Latex    General Appearance: alert and oriented to person,.She appears in no distress.   Skin: no rash or erythema  Head: normocephalic and atraumatic  Pulmonary/Chest: normal air movement, no respiratory distress and no chest wall tenderness  Abdomen: soft, non-tender, non-distended  Genitourinary: yost draining yellow urine   Extremities: no cyanosis, clubbing or edema         Labs:     Recent Labs     05/10/24  0619      K 3.9      CO2 24   BUN 24*   CREATININE 1.3*   GLUCOSE 84   CALCIUM 9.6       Lab Results   Component Value Date/Time    HGB 12.3 05/10/2024 06:19 AM    HCT 39.0 05/10/2024 06:19 AM     Narrative & Impression  EXAMINATION:  RETROPERITONEAL ULTRASOUND OF THE KIDNEYS AND URINARY BLADDER     5/9/2024     COMPARISON:  None     HISTORY:  ORDERING SYSTEM PROVIDED HISTORY: Azotemia and urinary retention  TECHNOLOGIST PROVIDED HISTORY:     Reason for exam:->Azotemia and  urinary retention  What reading provider will be dictating this exam?->CRC     FINDINGS:     Kidneys:     The right kidney measures 9.3 cm in length and the left kidney measures 10.1  cm in length.     Kidneys demonstrate normal cortical echogenicity with bilateral cortical  thinning noted.  No evidence of hydronephrosis or intrarenal stones.        Bladder:     Urinary bladder is decompressed with a Yost catheter and is not visualized.     IMPRESSION:  No evidence of hydronephrosis.  Bilateral renal cortical thinning.              Narrative & Impression  EXAMINATION:  ONE SUPINE XRAY VIEW(S) OF THE ABDOMEN     5/9/2024 2:52 pm     COMPARISON:  None.     HISTORY:  ORDERING SYSTEM PROVIDED HISTORY: Azotemia and urinary retention  TECHNOLOGIST PROVIDED HISTORY:  Reason for exam:->Azotemia and urinary retention     FINDINGS:  There is moderate levoconvex upper lumbar scoliosis.  There is some  degenerative changes in lower lumbar spine.     Lung bases are not optimally included.  Nonobstructed bowel loops in the  right upper quadrant is suggested.  Bowel gas pattern is normal.  No abnormal  abdominal calcifications are identified.     IMPRESSION:  Nonobstructive bowel gas pattern.  No acute process          Assessment/Plan:  Situational UR    Reviewed US and KUB  She can undergo a VT at the facility if ambulatory  Otherwise change yost q 30 days  Please call if needed     Gail Sims, APRN - CNP   [facility]

## 2024-05-10 NOTE — PLAN OF CARE
Problem: ABCDS Injury Assessment  Goal: Absence of physical injury  5/10/2024 0910 by Michelle Hernandez RN  Outcome: Progressing  5/9/2024 2339 by LORETA MCKEON  Outcome: Progressing  Flowsheets (Taken 5/8/2024 2230)  Absence of Physical Injury: Implement safety measures based on patient assessment     Problem: Skin/Tissue Integrity  Goal: Absence of new skin breakdown  Description: 1.  Monitor for areas of redness and/or skin breakdown  2.  Assess vascular access sites hourly  3.  Every 4-6 hours minimum:  Change oxygen saturation probe site  4.  Every 4-6 hours:  If on nasal continuous positive airway pressure, respiratory therapy assess nares and determine need for appliance change or resting period.  5/10/2024 0910 by Michelle Hernandez RN  Outcome: Progressing  5/9/2024 2339 by LORETA MCKEON  Outcome: Progressing     Problem: Discharge Planning  Goal: Discharge to home or other facility with appropriate resources  5/10/2024 0910 by Michelle Hernandez RN  Outcome: Progressing  5/9/2024 2339 by LORETA MCKEON  Outcome: Progressing  Flowsheets (Taken 5/6/2024 1650 by Diamond Aguilar, RN)  Discharge to home or other facility with appropriate resources: Refer to discharge planning if patient needs post-hospital services based on physician order or complex needs related to functional status, cognitive ability or social support system     Problem: Safety - Adult  Goal: Free from fall injury  Outcome: Progressing     Problem: Chronic Conditions and Co-morbidities  Goal: Patient's chronic conditions and co-morbidity symptoms are monitored and maintained or improved  Outcome: Progressing     Problem: Pain  Goal: Verbalizes/displays adequate comfort level or baseline comfort level  Outcome: Progressing

## 2024-05-10 NOTE — PROGRESS NOTES
Occupational Therapy  OT BEDSIDE TREATMENT NOTE      Date:5/10/2024  Patient Name: Marcela Henao  MRN: [id number]  : 1935  Room: [address] [place], [place] [address]        Evaluating OT: Tammy Marx OTR/L - [id number]     Referring Provider: Merary Lawson APRN - CNP   Specific Provider Orders/Date: \"OT eval and treat\" - 2024     Diagnosis: Weakness [R53.1]  General weakness [R53.1]  Failure to thrive in adult [R62.7]  Moderate dementia, unspecified dementia type, unspecified whether behavioral, psychotic, or mood disturbance or anxiety (HCC) [F03.B0]       Pertinent Medical History: DM, HLD, HTN, CVA      Precautions: fall risk - reports L knee samuel      Assessment of Current Deficits:    [x] Functional mobility             [x]ADLs           [x] Strength                  []Cognition   [x] Functional transfers           [x] IADLs         [x] Safety Awareness   [x]Endurance   [] Fine Coordination              [x] Balance      [] Vision/perception   []Sensation     []Gross Motor Coordination  [] ROM           [] Delirium                   [] Motor Control      OT PLAN OF CARE   OT POC is based on physician orders, patient diagnosis, and results of clinical assessment.  Frequency/Duration 2-5 days/week for 2 weeks PRN   Specific OT Treatment Interventions to Include:   * Instruction/training on adapted ADL techniques and AE recommendations to increase functional independence within precautions       * Training on energy conservation strategies, correct breathing pattern and techniques to improve independence/tolerance for self-care routine  * Functional transfer/mobility training/DME recommendations for increased independence, safety, and fall prevention  * Patient/Family education to increase follow through with safety techniques and functional independence  * Recommendation of environmental modifications for increased safety with functional transfers/mobility and ADLs  * Therapeutic exercise to improve motor endurance,

## 2024-05-10 NOTE — DISCHARGE INSTR - DIET

## 2024-05-10 NOTE — PLAN OF CARE
Problem: ABCDS Injury Assessment  Goal: Absence of physical injury  Outcome: Progressing  Flowsheets (Taken 5/8/2024 2230)  Absence of Physical Injury: Implement safety measures based on patient assessment     Problem: Skin/Tissue Integrity  Goal: Absence of new skin breakdown  Description: 1.  Monitor for areas of redness and/or skin breakdown  2.  Assess vascular access sites hourly  3.  Every 4-6 hours minimum:  Change oxygen saturation probe site  4.  Every 4-6 hours:  If on nasal continuous positive airway pressure, respiratory therapy assess nares and determine need for appliance change or resting period.  Outcome: Progressing     Problem: Discharge Planning  Goal: Discharge to home or other facility with appropriate resources  Outcome: Progressing  Flowsheets (Taken 5/6/2024 1650 by Diamond Aguilar, RN)  Discharge to home or other facility with appropriate resources: Refer to discharge planning if patient needs post-hospital services based on physician order or complex needs related to functional status, cognitive ability or social support system

## 2024-05-11 ENCOUNTER — OUTSIDE SERVICES (OUTPATIENT)
Dept: PRIMARY CARE CLINIC | Age: 89
End: 2024-05-11

## 2024-05-11 DIAGNOSIS — E78.5 HYPERLIPIDEMIA WITH TARGET LDL LESS THAN 100: ICD-10-CM

## 2024-05-11 DIAGNOSIS — N17.9 ACUTE KIDNEY INJURY SUPERIMPOSED ON CKD (HCC): ICD-10-CM

## 2024-05-11 DIAGNOSIS — N18.9 ACUTE KIDNEY INJURY SUPERIMPOSED ON CKD (HCC): ICD-10-CM

## 2024-05-11 DIAGNOSIS — E86.0 DEHYDRATION: Primary | ICD-10-CM

## 2024-05-11 DIAGNOSIS — R26.2 DIFFICULTY IN WALKING: ICD-10-CM

## 2024-05-11 DIAGNOSIS — I10 ESSENTIAL HYPERTENSION, BENIGN: Chronic | ICD-10-CM

## 2024-05-11 DIAGNOSIS — F03.90 DEMENTIA, UNSPECIFIED DEMENTIA SEVERITY, UNSPECIFIED DEMENTIA TYPE, UNSPECIFIED WHETHER BEHAVIORAL, PSYCHOTIC, OR MOOD DISTURBANCE OR ANXIETY (HCC): ICD-10-CM

## 2024-05-11 DIAGNOSIS — R29.6 FREQUENT FALLS: ICD-10-CM

## 2024-05-11 DIAGNOSIS — R53.81 PHYSICAL DECONDITIONING: ICD-10-CM

## 2024-05-11 DIAGNOSIS — Z91.81 AT MAXIMUM RISK FOR FALL: ICD-10-CM

## 2024-05-11 DIAGNOSIS — R26.2 AMBULATORY DYSFUNCTION: ICD-10-CM

## 2024-05-11 DIAGNOSIS — R53.1 GENERALIZED WEAKNESS: ICD-10-CM

## 2024-05-11 LAB
25(OH)D3 SERPL-MCNC: 58.6 NG/ML (ref 30–100)
ALBUMIN SERPL-MCNC: 3.4 G/DL (ref 3.5–5.2)
ALP SERPL-CCNC: 63 U/L (ref 35–104)
ALT SERPL-CCNC: 12 U/L (ref 0–32)
ANION GAP SERPL CALCULATED.3IONS-SCNC: 11 MMOL/L (ref 7–16)
AST SERPL-CCNC: 17 U/L (ref 0–31)
BILIRUB SERPL-MCNC: 0.3 MG/DL (ref 0–1.2)
BUN SERPL-MCNC: 23 MG/DL (ref 6–23)
CALCIUM SERPL-MCNC: 9.6 MG/DL (ref 8.6–10.2)
CHLORIDE SERPL-SCNC: 102 MMOL/L (ref 98–107)
CHOLEST SERPL-MCNC: 110 MG/DL
CO2 SERPL-SCNC: 23 MMOL/L (ref 22–29)
CREAT SERPL-MCNC: 1.4 MG/DL (ref 0.5–1)
ERYTHROCYTE [DISTWIDTH] IN BLOOD BY AUTOMATED COUNT: 13.5 % (ref 11.5–15)
GFR, ESTIMATED: 38 ML/MIN/1.73M2
GLUCOSE SERPL-MCNC: 187 MG/DL (ref 74–99)
HBA1C MFR BLD: 5.9 % (ref 4–5.6)
HCT VFR BLD AUTO: 41.7 % (ref 34–48)
HDLC SERPL-MCNC: 42 MG/DL
HGB BLD-MCNC: 13.3 G/DL (ref 11.5–15.5)
LDLC SERPL CALC-MCNC: 48 MG/DL
MCH RBC QN AUTO: 30.1 PG (ref 26–35)
MCHC RBC AUTO-ENTMCNC: 31.9 G/DL (ref 32–34.5)
MCV RBC AUTO: 94.3 FL (ref 80–99.9)
PLATELET # BLD AUTO: 263 K/UL (ref 130–450)
PMV BLD AUTO: 9.6 FL (ref 7–12)
POTASSIUM SERPL-SCNC: 4 MMOL/L (ref 3.5–5)
PROT SERPL-MCNC: 6 G/DL (ref 6.4–8.3)
RBC # BLD AUTO: 4.42 M/UL (ref 3.5–5.5)
SODIUM SERPL-SCNC: 136 MMOL/L (ref 132–146)
TRIGL SERPL-MCNC: 98 MG/DL
VLDLC SERPL CALC-MCNC: 20 MG/DL
WBC OTHER # BLD: 9.5 K/UL (ref 4.5–11.5)

## 2024-05-11 NOTE — PROGRESS NOTES
Marcela Henao (:  1935) is a 88 y.o. female.    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   Social History     Socioeconomic History    Marital status:    Tobacco Use    Smoking status: Former     Current packs/day: 0.00     Average packs/day: 2.0 packs/day for 30.0 years (60.0 ttl pk-yrs)     Types: Cigarettes     Start date: 10/3/1955     Quit date: 10/3/1985     Years since quittin.6    Smokeless tobacco: Never   Substance and Sexual Activity    Alcohol use: Yes     Comment: rarely    Drug use: No     Social Determinants of Health     Food Insecurity: No Food Insecurity (2024)    Hunger Vital Sign     Worried About Running Out of Food in the Last Year: Never true     Ran Out of Food in the Last Year: Never true   Transportation Needs: No Transportation Needs (2024)    PRAPARE - Transportation     Lack of Transportation (Medical): No     Lack of Transportation (Non-Medical): No   Housing Stability: Low Risk  (2024)    Housing Stability Vital Sign     Unable to Pay for Housing in the Last Year: No     Number of Places Lived in the Last Year: 1     Unstable Housing in the Last Year: No        HPI    Marcela Henao is a pleasant and cooperative, however confused 88-year-old female.  Hospital course discussed in brief.  Patient is unsure of why she got admitted thus I advised that she got admitted for dehydration and ambulatory dysfunction.  She improved with fluids and was thus

## 2024-05-13 LAB
ANION GAP SERPL CALCULATED.3IONS-SCNC: 15 MMOL/L (ref 7–16)
BUN SERPL-MCNC: 6 MG/DL (ref 6–23)
CALCIUM SERPL-MCNC: 9.4 MG/DL (ref 8.6–10.2)
CHLORIDE SERPL-SCNC: 97 MMOL/L (ref 98–107)
CO2 SERPL-SCNC: 25 MMOL/L (ref 22–29)
CREAT SERPL-MCNC: 0.5 MG/DL (ref 0.5–1)
GFR, ESTIMATED: 89 ML/MIN/1.73M2
GLUCOSE SERPL-MCNC: 85 MG/DL (ref 74–99)
POTASSIUM SERPL-SCNC: 3.8 MMOL/L (ref 3.5–5)
SODIUM SERPL-SCNC: 137 MMOL/L (ref 132–146)

## 2024-05-16 ENCOUNTER — OUTSIDE SERVICES (OUTPATIENT)
Dept: PRIMARY CARE CLINIC | Age: 89
End: 2024-05-16

## 2024-05-16 DIAGNOSIS — M51.36 LUMBAR DEGENERATIVE DISC DISEASE: ICD-10-CM

## 2024-05-16 DIAGNOSIS — N17.9 ACUTE KIDNEY INJURY SUPERIMPOSED ON CKD (HCC): ICD-10-CM

## 2024-05-16 DIAGNOSIS — F03.90 DEMENTIA, UNSPECIFIED DEMENTIA SEVERITY, UNSPECIFIED DEMENTIA TYPE, UNSPECIFIED WHETHER BEHAVIORAL, PSYCHOTIC, OR MOOD DISTURBANCE OR ANXIETY (HCC): Primary | ICD-10-CM

## 2024-05-16 DIAGNOSIS — E86.0 DEHYDRATION: ICD-10-CM

## 2024-05-16 DIAGNOSIS — R53.1 GENERALIZED WEAKNESS: ICD-10-CM

## 2024-05-16 DIAGNOSIS — N18.9 ACUTE KIDNEY INJURY SUPERIMPOSED ON CKD (HCC): ICD-10-CM

## 2024-05-16 DIAGNOSIS — R26.2 AMBULATORY DYSFUNCTION: ICD-10-CM

## 2024-05-16 DIAGNOSIS — R26.2 DIFFICULTY IN WALKING: ICD-10-CM

## 2024-05-16 DIAGNOSIS — E11.65 TYPE 2 DIABETES MELLITUS WITH HYPERGLYCEMIA, WITHOUT LONG-TERM CURRENT USE OF INSULIN (HCC): ICD-10-CM

## 2024-05-16 DIAGNOSIS — R62.7 ADULT FAILURE TO THRIVE: ICD-10-CM

## 2024-05-16 DIAGNOSIS — I10 ESSENTIAL HYPERTENSION, BENIGN: ICD-10-CM

## 2024-05-16 DIAGNOSIS — R29.6 FREQUENT FALLS: ICD-10-CM

## 2024-05-16 DIAGNOSIS — E78.5 HYPERLIPIDEMIA WITH TARGET LDL LESS THAN 100: ICD-10-CM

## 2024-05-17 LAB
ALBUMIN SERPL-MCNC: 3.8 G/DL (ref 3.5–5.2)
ALP SERPL-CCNC: 133 U/L (ref 35–104)
ALT SERPL-CCNC: 10 U/L (ref 0–32)
ANION GAP SERPL CALCULATED.3IONS-SCNC: 13 MMOL/L (ref 7–16)
AST SERPL-CCNC: 54 U/L (ref 0–31)
BILIRUB SERPL-MCNC: 0.6 MG/DL (ref 0–1.2)
BUN SERPL-MCNC: 8 MG/DL (ref 6–23)
CALCIUM SERPL-MCNC: 9.2 MG/DL (ref 8.6–10.2)
CHLORIDE SERPL-SCNC: 101 MMOL/L (ref 98–107)
CO2 SERPL-SCNC: 26 MMOL/L (ref 22–29)
CREAT SERPL-MCNC: 0.5 MG/DL (ref 0.5–1)
ERYTHROCYTE [DISTWIDTH] IN BLOOD BY AUTOMATED COUNT: 14.2 % (ref 11.5–15)
GFR, ESTIMATED: 90 ML/MIN/1.73M2
GLUCOSE SERPL-MCNC: 90 MG/DL (ref 74–99)
HCT VFR BLD AUTO: 32.9 % (ref 34–48)
HGB BLD-MCNC: 10.5 G/DL (ref 11.5–15.5)
MCH RBC QN AUTO: 33.5 PG (ref 26–35)
MCHC RBC AUTO-ENTMCNC: 31.9 G/DL (ref 32–34.5)
MCV RBC AUTO: 105.1 FL (ref 80–99.9)
PLATELET # BLD AUTO: 176 K/UL (ref 130–450)
PMV BLD AUTO: 9.4 FL (ref 7–12)
POTASSIUM SERPL-SCNC: 4 MMOL/L (ref 3.5–5)
PROT SERPL-MCNC: 7.1 G/DL (ref 6.4–8.3)
RBC # BLD AUTO: 3.13 M/UL (ref 3.5–5.5)
SODIUM SERPL-SCNC: 140 MMOL/L (ref 132–146)
WBC OTHER # BLD: 6 K/UL (ref 4.5–11.5)

## 2024-05-20 ENCOUNTER — OUTSIDE SERVICES (OUTPATIENT)
Dept: PRIMARY CARE CLINIC | Age: 89
End: 2024-05-20
Payer: MEDICARE

## 2024-05-20 DIAGNOSIS — R11.0 NAUSEA: ICD-10-CM

## 2024-05-20 DIAGNOSIS — N18.9 ACUTE KIDNEY INJURY SUPERIMPOSED ON CKD (HCC): ICD-10-CM

## 2024-05-20 DIAGNOSIS — E86.0 DEHYDRATION: Primary | ICD-10-CM

## 2024-05-20 DIAGNOSIS — N17.9 ACUTE KIDNEY INJURY SUPERIMPOSED ON CKD (HCC): ICD-10-CM

## 2024-05-20 PROCEDURE — 99308 SBSQ NF CARE LOW MDM 20: CPT | Performed by: STUDENT IN AN ORGANIZED HEALTH CARE EDUCATION/TRAINING PROGRAM

## 2024-05-20 NOTE — PROGRESS NOTES
Marcela Henao (:  1935) is a 88 y.o. female.    Subjective   SUBJECTIVE/OBJECTIVE:  HPI  Location: [address]  Nursing and progress notes reviewed.    Patient states she is cold and nauseous.  She wants another blanket.  She denies any chest pain or shortness of breath.    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   Constitutional:       General: She is not in acute distress.     Appearance: Normal appearance. She is not ill-appearing.   HENT:      Head: Normocephalic and atraumatic.      Right Ear: External ear normal.      Left Ear: External ear normal.      Nose: Nose normal.   Eyes:      Extraocular Movements: Extraocular movements intact.      Conjunctiva/sclera: Conjunctivae normal.   Pulmonary:      Effort: Pulmonary effort is normal.   Neurological:      Mental Status: She is alert.         Recent Labs     24  0553 24  0913 05/10/24  0619   WBC 6.0 9.5 7.8   HGB 10.5* 13.3 12.3   HCT 32.9* 41.7 39.0   .1* 94.3 94.4    263 235      Lab Results   Component Value Date     2024    K 4.0 2024     2024    CO2 26 2024    BUN 8 2024    CREATININE 0.5 2024    GLUCOSE 90 2024    CALCIUM 9.2 2024    BILITOT 0.6 2024    ALKPHOS 133 (H) 2024    AST 54 (H) 2024    ALT 10 2024    LABGLOM 90 2024    GFRAA 52 2022        Hemoglobin A1C   Date Value Ref Range Status   2024 5.9 (H) 4.0 - 5.6 % Final     Lab Results   Component Value Date    CHOL 110 2024    CHOL 171 2024    CHOL 202 (H) 04/10/2024

## 2024-05-21 ENCOUNTER — OUTSIDE SERVICES (OUTPATIENT)
Dept: PRIMARY CARE CLINIC | Age: 89
End: 2024-05-21
Payer: MEDICARE

## 2024-05-21 DIAGNOSIS — N18.9 ACUTE KIDNEY INJURY SUPERIMPOSED ON CKD (HCC): ICD-10-CM

## 2024-05-21 DIAGNOSIS — E11.65 TYPE 2 DIABETES MELLITUS WITH HYPERGLYCEMIA, WITHOUT LONG-TERM CURRENT USE OF INSULIN (HCC): ICD-10-CM

## 2024-05-21 DIAGNOSIS — R26.2 DIFFICULTY IN WALKING: ICD-10-CM

## 2024-05-21 DIAGNOSIS — E86.0 DEHYDRATION: ICD-10-CM

## 2024-05-21 DIAGNOSIS — I10 ESSENTIAL HYPERTENSION, BENIGN: ICD-10-CM

## 2024-05-21 DIAGNOSIS — F01.50 VASCULAR DEMENTIA WITHOUT BEHAVIORAL DISTURBANCE, PSYCHOTIC DISTURBANCE, MOOD DISTURBANCE, OR ANXIETY, UNSPECIFIED DEMENTIA SEVERITY (HCC): Primary | ICD-10-CM

## 2024-05-21 DIAGNOSIS — M51.36 LUMBAR DEGENERATIVE DISC DISEASE: ICD-10-CM

## 2024-05-21 DIAGNOSIS — R11.0 NAUSEA: ICD-10-CM

## 2024-05-21 DIAGNOSIS — R29.6 FREQUENT FALLS: ICD-10-CM

## 2024-05-21 DIAGNOSIS — N17.9 ACUTE KIDNEY INJURY SUPERIMPOSED ON CKD (HCC): ICD-10-CM

## 2024-05-21 DIAGNOSIS — E78.5 HYPERLIPIDEMIA WITH TARGET LDL LESS THAN 100: ICD-10-CM

## 2024-05-21 DIAGNOSIS — R53.1 GENERALIZED WEAKNESS: ICD-10-CM

## 2024-05-21 DIAGNOSIS — R62.7 ADULT FAILURE TO THRIVE: ICD-10-CM

## 2024-05-21 PROCEDURE — 99309 SBSQ NF CARE MODERATE MDM 30: CPT | Performed by: NURSE PRACTITIONER

## 2024-05-23 ENCOUNTER — OUTSIDE SERVICES (OUTPATIENT)
Dept: PRIMARY CARE CLINIC | Age: 89
End: 2024-05-23
Payer: MEDICARE

## 2024-05-23 DIAGNOSIS — R53.1 GENERALIZED WEAKNESS: ICD-10-CM

## 2024-05-23 DIAGNOSIS — E86.0 DEHYDRATION: ICD-10-CM

## 2024-05-23 DIAGNOSIS — R62.7 ADULT FAILURE TO THRIVE: ICD-10-CM

## 2024-05-23 DIAGNOSIS — E78.5 HYPERLIPIDEMIA WITH TARGET LDL LESS THAN 100: ICD-10-CM

## 2024-05-23 DIAGNOSIS — R26.2 DIFFICULTY IN WALKING: ICD-10-CM

## 2024-05-23 DIAGNOSIS — I10 ESSENTIAL HYPERTENSION, BENIGN: ICD-10-CM

## 2024-05-23 DIAGNOSIS — F01.50 VASCULAR DEMENTIA WITHOUT BEHAVIORAL DISTURBANCE, PSYCHOTIC DISTURBANCE, MOOD DISTURBANCE, OR ANXIETY, UNSPECIFIED DEMENTIA SEVERITY (HCC): Primary | ICD-10-CM

## 2024-05-23 DIAGNOSIS — N17.9 ACUTE KIDNEY INJURY SUPERIMPOSED ON CKD (HCC): ICD-10-CM

## 2024-05-23 DIAGNOSIS — R11.0 NAUSEA: ICD-10-CM

## 2024-05-23 DIAGNOSIS — N18.9 ACUTE KIDNEY INJURY SUPERIMPOSED ON CKD (HCC): ICD-10-CM

## 2024-05-23 DIAGNOSIS — E11.65 TYPE 2 DIABETES MELLITUS WITH HYPERGLYCEMIA, WITHOUT LONG-TERM CURRENT USE OF INSULIN (HCC): ICD-10-CM

## 2024-05-23 DIAGNOSIS — R29.6 FREQUENT FALLS: ICD-10-CM

## 2024-05-23 DIAGNOSIS — M51.36 LUMBAR DEGENERATIVE DISC DISEASE: ICD-10-CM

## 2024-05-23 PROCEDURE — 99309 SBSQ NF CARE MODERATE MDM 30: CPT | Performed by: NURSE PRACTITIONER

## 2024-05-24 LAB
ALBUMIN SERPL-MCNC: 3.9 G/DL (ref 3.5–5.2)
ALP SERPL-CCNC: 123 U/L (ref 35–104)
ALT SERPL-CCNC: 10 U/L (ref 0–32)
ANION GAP SERPL CALCULATED.3IONS-SCNC: 14 MMOL/L (ref 7–16)
AST SERPL-CCNC: 48 U/L (ref 0–31)
BILIRUB SERPL-MCNC: 0.7 MG/DL (ref 0–1.2)
BUN SERPL-MCNC: 14 MG/DL (ref 6–23)
CALCIUM SERPL-MCNC: 9.2 MG/DL (ref 8.6–10.2)
CHLORIDE SERPL-SCNC: 103 MMOL/L (ref 98–107)
CO2 SERPL-SCNC: 23 MMOL/L (ref 22–29)
CREAT SERPL-MCNC: 0.7 MG/DL (ref 0.5–1)
ERYTHROCYTE [DISTWIDTH] IN BLOOD BY AUTOMATED COUNT: 14.3 % (ref 11.5–15)
GFR, ESTIMATED: 84 ML/MIN/1.73M2
GLUCOSE SERPL-MCNC: 87 MG/DL (ref 74–99)
HCT VFR BLD AUTO: 34.7 % (ref 34–48)
HGB BLD-MCNC: 11.1 G/DL (ref 11.5–15.5)
MCH RBC QN AUTO: 34 PG (ref 26–35)
MCHC RBC AUTO-ENTMCNC: 32 G/DL (ref 32–34.5)
MCV RBC AUTO: 106.4 FL (ref 80–99.9)
PLATELET # BLD AUTO: 182 K/UL (ref 130–450)
PMV BLD AUTO: 9.6 FL (ref 7–12)
POTASSIUM SERPL-SCNC: 4.7 MMOL/L (ref 3.5–5)
PROT SERPL-MCNC: 7.3 G/DL (ref 6.4–8.3)
RBC # BLD AUTO: 3.26 M/UL (ref 3.5–5.5)
SODIUM SERPL-SCNC: 140 MMOL/L (ref 132–146)
WBC OTHER # BLD: 6.1 K/UL (ref 4.5–11.5)

## 2024-05-27 ASSESSMENT — ENCOUNTER SYMPTOMS
VOMITING: 0
SINUS PRESSURE: 0
EYE ITCHING: 0
ABDOMINAL PAIN: 0
EYE REDNESS: 0
NAUSEA: 0
COUGH: 0
DIARRHEA: 0
ABDOMINAL DISTENTION: 0
EYE DISCHARGE: 0
BACK PAIN: 1
SORE THROAT: 0
RHINORRHEA: 0
EYE PAIN: 0
SHORTNESS OF BREATH: 0
CHEST TIGHTNESS: 0
WHEEZING: 0
CONSTIPATION: 0

## 2024-05-27 NOTE — PROGRESS NOTES
Marcela Henao (:  1935) is a 88 y.o. female that is seen today for skilled assessment    Subjective     Marcela is awake alert with mild confusion.  She is sitting up in bed in room with her TV on.  She has no complaints of pain or discomfort when asked.  She continues to work in therapies as tolerated.  Nursing has no concerns will let Dr. NAIDU or PA know of any changes.    Location: [facility]  All nursing and progress notes reviewed.    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 2015   • Stenosis of left middle cerebral artery 4/3/2015   • Stroke (HCC)        Allergies   Allergen Reactions   • Latex Itching      Current Outpatient Medications   Medication Sig Dispense Refill   • atorvastatin (LIPITOR) 40 MG tablet Take 1 tablet by mouth nightly 30 tablet 3   • losartan (COZAAR) 50 MG tablet Take 1 tablet by mouth nightly 30 tablet 3   • amLODIPine (NORVASC) 5 MG tablet Take 1 tablet by mouth daily 30 tablet 3   • white petrolatum OINT ointment Apply topically in the morning and at bedtime  0   • clopidogrel (PLAVIX) 75 MG tablet Take 1 tablet by mouth every morning     • donepezil (ARICEPT) 5 MG tablet Take 1 tablet by mouth every morning     • hydroCHLOROthiazide 12.5 MG tablet Take 2 tablets by mouth every morning     • metoprolol succinate (TOPROL XL) 25 MG extended release tablet Take 1 tablet by mouth nightly     • potassium chloride (KLOR-CON M) 20 MEQ extended release tablet Take 1 tablet by mouth every morning     • trospium (SANCTURA) 20 MG tablet Take 1 tablet by mouth 2 times daily (before meals) 60 tablet 3   • glipiZIDE (GLUCOTROL) 5 MG tablet Take 1 tablet by mouth every morning     • SITagliptin (JANUVIA) 100 MG tablet Take 1 tablet by mouth every morning     • folic acid (FOLVITE) 1 MG tablet Take 1 tablet by mouth every

## 2024-05-31 LAB
ALBUMIN SERPL-MCNC: 3.3 G/DL (ref 3.5–5.2)
ALP SERPL-CCNC: 68 U/L (ref 35–104)
ALT SERPL-CCNC: 15 U/L (ref 0–32)
ANION GAP SERPL CALCULATED.3IONS-SCNC: 17 MMOL/L (ref 7–16)
AST SERPL-CCNC: 16 U/L (ref 0–31)
BILIRUB SERPL-MCNC: 0.5 MG/DL (ref 0–1.2)
BUN SERPL-MCNC: 26 MG/DL (ref 6–23)
CALCIUM SERPL-MCNC: 9.5 MG/DL (ref 8.6–10.2)
CHLORIDE SERPL-SCNC: 104 MMOL/L (ref 98–107)
CO2 SERPL-SCNC: 22 MMOL/L (ref 22–29)
CREAT SERPL-MCNC: 1.6 MG/DL (ref 0.5–1)
ERYTHROCYTE [DISTWIDTH] IN BLOOD BY AUTOMATED COUNT: 13.2 % (ref 11.5–15)
GFR, ESTIMATED: 31 ML/MIN/1.73M2
GLUCOSE SERPL-MCNC: 92 MG/DL (ref 74–99)
HCT VFR BLD AUTO: 38.8 % (ref 34–48)
HGB BLD-MCNC: 12.6 G/DL (ref 11.5–15.5)
MCH RBC QN AUTO: 31 PG (ref 26–35)
MCHC RBC AUTO-ENTMCNC: 32.5 G/DL (ref 32–34.5)
MCV RBC AUTO: 95.6 FL (ref 80–99.9)
PLATELET # BLD AUTO: 233 K/UL (ref 130–450)
PMV BLD AUTO: 10.2 FL (ref 7–12)
POTASSIUM SERPL-SCNC: 4 MMOL/L (ref 3.5–5)
PROT SERPL-MCNC: 5.7 G/DL (ref 6.4–8.3)
RBC # BLD AUTO: 4.06 M/UL (ref 3.5–5.5)
SODIUM SERPL-SCNC: 143 MMOL/L (ref 132–146)
WBC OTHER # BLD: 7.9 K/UL (ref 4.5–11.5)

## 2024-06-05 LAB
ANION GAP SERPL CALCULATED.3IONS-SCNC: 9 MMOL/L (ref 7–16)
BUN SERPL-MCNC: 32 MG/DL (ref 6–23)
CALCIUM SERPL-MCNC: 9.5 MG/DL (ref 8.6–10.2)
CHLORIDE SERPL-SCNC: 101 MMOL/L (ref 98–107)
CO2 SERPL-SCNC: 26 MMOL/L (ref 22–29)
CREAT SERPL-MCNC: 1.4 MG/DL (ref 0.5–1)
ERYTHROCYTE [DISTWIDTH] IN BLOOD BY AUTOMATED COUNT: 13.5 % (ref 11.5–15)
GFR, ESTIMATED: 36 ML/MIN/1.73M2
GLUCOSE SERPL-MCNC: 102 MG/DL (ref 74–99)
HCT VFR BLD AUTO: 39.2 % (ref 34–48)
HGB BLD-MCNC: 12.5 G/DL (ref 11.5–15.5)
MCH RBC QN AUTO: 29.8 PG (ref 26–35)
MCHC RBC AUTO-ENTMCNC: 31.9 G/DL (ref 32–34.5)
MCV RBC AUTO: 93.6 FL (ref 80–99.9)
PLATELET # BLD AUTO: 227 K/UL (ref 130–450)
PMV BLD AUTO: 9.9 FL (ref 7–12)
POTASSIUM SERPL-SCNC: 4.1 MMOL/L (ref 3.5–5)
RBC # BLD AUTO: 4.19 M/UL (ref 3.5–5.5)
SODIUM SERPL-SCNC: 136 MMOL/L (ref 132–146)
WBC OTHER # BLD: 7.8 K/UL (ref 4.5–11.5)

## 2024-06-05 ASSESSMENT — ENCOUNTER SYMPTOMS
VOMITING: 0
ABDOMINAL DISTENTION: 0
CONSTIPATION: 0
CHEST TIGHTNESS: 0
BACK PAIN: 1
ABDOMINAL PAIN: 0
VOMITING: 0
SINUS PRESSURE: 0
BACK PAIN: 1
RHINORRHEA: 0
COUGH: 0
ABDOMINAL DISTENTION: 0
EYE PAIN: 0
EYE ITCHING: 0
WHEEZING: 0
SHORTNESS OF BREATH: 0
RHINORRHEA: 0
COUGH: 0
EYE PAIN: 0
CHEST TIGHTNESS: 0
EYE DISCHARGE: 0
SHORTNESS OF BREATH: 0
DIARRHEA: 0
SINUS PRESSURE: 0
SORE THROAT: 0
EYE DISCHARGE: 0
EYE REDNESS: 0
EYE REDNESS: 0
NAUSEA: 0
ABDOMINAL PAIN: 0
WHEEZING: 0
NAUSEA: 0
DIARRHEA: 0
EYE ITCHING: 0
CONSTIPATION: 0
SORE THROAT: 0

## 2024-06-05 NOTE — PROGRESS NOTES
Marcela Henao (:  1935) is a 88 y.o. female that is seen today for skilled assessment    Subjective     Marcela is sitting up in bed in room and just received her breakfast.  She is awake alert with mild confusion.  She is in no obvious distress.  She denies pain or discomfort at this time. She continues to work in therapies as tolerated.  No concerns from nursing. Nursing will let Dr. NP or PA know of any changes.    Location: [facility]  All nursing and progress notes reviewed.    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    Stroke (HCC)        Allergies   Allergen Reactions    Latex Itching      Current Outpatient Medications   Medication Sig Dispense Refill    atorvastatin (LIPITOR) 40 MG tablet Take 1 tablet by mouth nightly 30 tablet 3    losartan (COZAAR) 50 MG tablet Take 1 tablet by mouth nightly 30 tablet 3    amLODIPine (NORVASC) 5 MG tablet Take 1 tablet by mouth daily 30 tablet 3    white petrolatum OINT ointment Apply topically in the morning and at bedtime  0    clopidogrel (PLAVIX) 75 MG tablet Take 1 tablet by mouth every morning      donepezil (ARICEPT) 5 MG tablet Take 1 tablet by mouth every morning      hydroCHLOROthiazide 12.5 MG tablet Take 2 tablets by mouth every morning      metoprolol succinate (TOPROL XL) 25 MG extended release tablet Take 1 tablet by mouth nightly      potassium chloride (KLOR-CON M) 20 MEQ extended release tablet Take 1 tablet by mouth every morning      trospium (SANCTURA) 20 MG tablet Take 1 tablet by mouth 2 times daily (before meals) 60 tablet 3    glipiZIDE (GLUCOTROL) 5 MG tablet Take 1 tablet by mouth every morning      SITagliptin (JANUVIA) 100 MG tablet Take 1 tablet by mouth every morning      folic acid (FOLVITE) 1 MG tablet Take

## 2024-06-05 NOTE — PROGRESS NOTES
Cholecalciferol (VITAMIN D3) 50 MCG (2000 UT) CAPS Take 1 capsule by mouth every morning      FLUoxetine (PROZAC) 40 MG capsule Take 1 capsule by mouth every morning      aspirin 81 MG EC tablet Take 1 tablet by mouth every morning       No current facility-administered medications for this visit.        Review of Systems   Constitutional:  Negative for appetite change, chills, diaphoresis, fatigue and fever.   HENT:  Negative for congestion, ear pain, postnasal drip, rhinorrhea, sinus pressure, sneezing and sore throat.    Eyes:  Negative for pain, discharge, redness and itching.   Respiratory:  Negative for cough, chest tightness, shortness of breath and wheezing.    Cardiovascular:  Negative for chest pain and palpitations.   Gastrointestinal:  Negative for abdominal distention, abdominal pain, constipation, diarrhea, nausea and vomiting.   Musculoskeletal:  Positive for back pain and gait problem. Negative for neck pain and neck stiffness.   Skin:  Negative for rash.   Neurological:  Positive for weakness. Negative for dizziness, seizures, syncope, light-headedness and headaches.          Objective   Physical Exam  Vitals and nursing note reviewed.   Constitutional:       General: She is not in acute distress.     Appearance: Normal appearance. She is not ill-appearing.   HENT:      Head: Normocephalic and atraumatic.      Right Ear: External ear normal.      Left Ear: External ear normal.      Nose: Nose normal. No rhinorrhea.      Mouth/Throat:      Mouth: Mucous membranes are moist.      Pharynx: Oropharynx is clear. No posterior oropharyngeal erythema.   Eyes:      General:         Right eye: No discharge.         Left eye: No discharge.      Conjunctiva/sclera: Conjunctivae normal.      Pupils: Pupils are equal, round, and reactive to light.   Cardiovascular:      Rate and Rhythm: Normal rate and regular rhythm.      Heart sounds: Normal heart sounds. No murmur heard.     No friction rub. No gallop.